# Patient Record
Sex: FEMALE | Race: WHITE | NOT HISPANIC OR LATINO | Employment: FULL TIME | URBAN - METROPOLITAN AREA
[De-identification: names, ages, dates, MRNs, and addresses within clinical notes are randomized per-mention and may not be internally consistent; named-entity substitution may affect disease eponyms.]

---

## 2017-02-19 ENCOUNTER — OFFICE VISIT (OUTPATIENT)
Dept: URGENT CARE | Facility: URGENT CARE | Age: 37
End: 2017-02-19
Payer: COMMERCIAL

## 2017-02-19 VITALS
BODY MASS INDEX: 29.02 KG/M2 | HEIGHT: 64 IN | TEMPERATURE: 98 F | WEIGHT: 170 LBS | DIASTOLIC BLOOD PRESSURE: 80 MMHG | OXYGEN SATURATION: 99 % | HEART RATE: 94 BPM | SYSTOLIC BLOOD PRESSURE: 114 MMHG

## 2017-02-19 DIAGNOSIS — R50.9 FEVER, UNSPECIFIED: ICD-10-CM

## 2017-02-19 DIAGNOSIS — J00 ACUTE NASOPHARYNGITIS: ICD-10-CM

## 2017-02-19 DIAGNOSIS — H66.003 ACUTE SUPPURATIVE OTITIS MEDIA OF BOTH EARS WITHOUT SPONTANEOUS RUPTURE OF TYMPANIC MEMBRANES, RECURRENCE NOT SPECIFIED: Primary | ICD-10-CM

## 2017-02-19 LAB
DEPRECATED S PYO AG THROAT QL EIA: NORMAL
FLUAV+FLUBV AG SPEC QL: NORMAL
FLUAV+FLUBV AG SPEC QL: NORMAL
MICRO REPORT STATUS: NORMAL
SPECIMEN SOURCE: NORMAL
SPECIMEN SOURCE: NORMAL

## 2017-02-19 PROCEDURE — 87804 INFLUENZA ASSAY W/OPTIC: CPT | Performed by: FAMILY MEDICINE

## 2017-02-19 PROCEDURE — 99213 OFFICE O/P EST LOW 20 MIN: CPT | Performed by: FAMILY MEDICINE

## 2017-02-19 PROCEDURE — 87081 CULTURE SCREEN ONLY: CPT | Performed by: FAMILY MEDICINE

## 2017-02-19 PROCEDURE — 87880 STREP A ASSAY W/OPTIC: CPT | Performed by: FAMILY MEDICINE

## 2017-02-19 RX ORDER — AMOXICILLIN 500 MG/1
500 CAPSULE ORAL 3 TIMES DAILY
Qty: 30 CAPSULE | Refills: 0 | Status: SHIPPED | OUTPATIENT
Start: 2017-02-19 | End: 2017-03-01

## 2017-02-19 NOTE — NURSING NOTE
"Chief Complaint   Patient presents with     Urgent Care     Pharyngitis     c/o sore throat for 5 days and eye swollen,ear pain and cough for 2 days       Initial /80 (BP Location: Left arm, Patient Position: Chair, Cuff Size: Adult Regular)  Pulse 94  Temp 98  F (36.7  C) (Tympanic)  Ht 5' 4\" (1.626 m)  Wt 170 lb (77.1 kg)  LMP 01/29/2017  SpO2 99%  Breastfeeding? No  BMI 29.18 kg/m2 Estimated body mass index is 29.18 kg/(m^2) as calculated from the following:    Height as of this encounter: 5' 4\" (1.626 m).    Weight as of this encounter: 170 lb (77.1 kg).  Medication Reconciliation: complete   Clover Navarro MA    "

## 2017-02-19 NOTE — MR AVS SNAPSHOT
After Visit Summary   2/19/2017    Arabella Arrington    MRN: [id number]           Patient Information     Date Of Birth          1980        Visit Information        Provider Department      2/19/2017 12:00 PM Noy Hall DO [facility]        Today's Diagnoses     Acute suppurative otitis media of both ears without spontaneous rupture of tympanic membranes, recurrence not specified    -  1    Fever, unspecified        Acute nasopharyngitis          Care Instructions    Start the antibiotic today.    Good pain control for the ear infections.    If you have and worsening symptoms develop for the eye -- redness, increased swelling, pain in the eyeball, vision changes, light sensitivity, etc - go to the eye clinic for further evaluation.        Follow-ups after your visit        Who to contact     If you have questions or need follow up information about today's clinic visit or your schedule please contact [facility] directly at [phone].  Normal or non-critical lab and imaging results will be communicated to you by Razienthart, letter or phone within 4 business days after the clinic has received the results. If you do not hear from us within 7 days, please contact the clinic through Razienthart or phone. If you have a critical or abnormal lab result, we will notify you by phone as soon as possible.  Submit refill requests through Syrenaica or call your pharmacy and they will forward the refill request to us. Please allow 3 business days for your refill to be completed.          Additional Information About Your Visit        Razienthart Information     Syrenaica gives you secure access to your electronic health record. If you see a primary care provider, you can also send messages to your care team and make appointments. If you have questions, please call your primary care clinic.  If you do not have a primary care provider, please call [phone] and they  "will assist you.        Care EveryWhere ID     This is your Care EveryWhere ID. This could be used by other organizations to access your [place] medical records  [id number]        Your Vitals Were     Pulse Temperature Height Last Period Pulse Oximetry Breastfeeding?    94 98  F (36.7  C) (Tympanic) 5' 4\" (1.626 m) 01/29/2017 99% No    BMI (Body Mass Index)                   29.18 kg/m2            Blood Pressure from Last 3 Encounters:   02/19/17 114/80   01/12/16 117/81   12/18/15 128/76    Weight from Last 3 Encounters:   02/19/17 170 lb (77.1 kg)   01/12/16 185 lb (83.9 kg)   12/18/15 181 lb 9.6 oz (82.4 kg)              We Performed the Following     Beta strep group A culture     Influenza A/B antigen     Strep, Rapid Screen          Today's Medication Changes          These changes are accurate as of: 2/19/17  1:51 PM.  If you have any questions, ask your nurse or doctor.               Start taking these medicines.        Dose/Directions    amoxicillin 500 MG capsule   Commonly known as:  AMOXIL   Used for:  Acute suppurative otitis media of both ears without spontaneous rupture of tympanic membranes, recurrence not specified   Started by:  Noy Hall,         Dose:  500 mg   Take 1 capsule (500 mg) by mouth 3 times daily for 10 days   Quantity:  30 capsule   Refills:  0            Where to get your medicines      These medications were sent to [id number] - [place], [place] - [address] AT [facility]  [address], [place] [address]     Phone:  [phone]     amoxicillin 500 MG capsule                Primary Care Provider Office Phone # Fax #    Barbara EMIL [facility] [phone]       [address] [address]        Thank you!     Thank you for choosing [facility]  for your care. Our goal is always to provide you with excellent care. Hearing back from our patients is one way " we can continue to improve our services. Please take a few minutes to complete the written survey that you may receive in the mail after your visit with us. Thank you!             Your Updated Medication List - Protect others around you: Learn how to safely use, store and throw away your medicines at www.disposemymeds.org.          This list is accurate as of: 2/19/17  1:51 PM.  Always use your most recent med list.                   Brand Name Dispense Instructions for use    amoxicillin 500 MG capsule    AMOXIL    30 capsule    Take 1 capsule (500 mg) by mouth 3 times daily for 10 days       levonorgestrel-ethinyl estradiol 0.1-20 MG-MCG per tablet    AVIANE,ALESSE,LESSINA    84 tablet    Take 1 tablet by mouth daily

## 2017-02-19 NOTE — PROGRESS NOTES
"SUBJECTIVE:   Arabella Arrington is a 36 year old female presenting with a chief complaint of Upper Respiratory/ENT symptoms:  Symptoms started 5 days ago and started with sore throat x 3 day, then rest of symptoms developed.  symptoms include: feverish/chills initially, nasal congestion, rhinorrhea, cough , ear pain right past 2 days, and headache.  Left eye felt a little irritated yesterday, not painful, no vision changes.  Woke with puffy upper right eyelid this morning.  Eye no longer feels irritated.   No redness to the eyelid.  Not itching.  Hasn't worn contact lenses in over a year.   Course of illness is: same.    Treatment measures tried:  OTC meds.  Predisposing factors include:  Non smoker, no h/o asthma.     ROS:  5-Point Review of Systems Negative-- Except as stated above.    OBJECTIVE  /80 (BP Location: Left arm, Patient Position: Chair, Cuff Size: Adult Regular)  Pulse 94  Temp 98  F (36.7  C) (Tympanic)  Ht 5' 4\" (1.626 m)  Wt 170 lb (77.1 kg)  LMP 01/29/2017  SpO2 99%  Breastfeeding? No  BMI 29.18 kg/m2  GENERAL:  Awake, alert and interactive. No acute distress.  HEENT:   NC/AT, EOMI, clear conjunctiva.  Clear nasal discharge.  Oropharynx with mild erythema, moist and clear.  TM's both erythematous, right side bulging, and EAC's benign.  NECK: supple and free of adenopathy  CHEST:  Lungs are clear, no rhonchi, wheezing or rales. Normal symmetric air entry throughout both lung fields.   HEART:  S1 and S2 normal, no murmurs, clicks, gallops or rubs. Regular rate and rhythm.    Results for orders placed or performed in visit on 02/19/17   Influenza A/B antigen   Result Value Ref Range    Influenza A/B Agn Specimen Nasal     Influenza A  NEG     Negative   Test results must be correlated with clinical data. If necessary, results   should be confirmed by a molecular assay or viral culture.      Influenza B  NEG     Negative   Test results must be correlated with clinical data. If necessary, results   " should be confirmed by a molecular assay or viral culture.         ASSESSMENT/PLAN    ICD-10-CM    1. Acute suppurative otitis media of both ears without spontaneous rupture of tympanic membranes, recurrence not specified H66.003 amoxicillin (AMOXIL) 500 MG capsule   2. Fever, unspecified R50.9 Strep, Rapid Screen     Influenza A/B antigen     Beta strep group A culture   3. Acute nasopharyngitis J00        We discussed the expected course and symptomatic cares in detail, including return to care if symptoms not improving as expected, do not resolve completely, or if any new or worsening symptoms develop.

## 2017-02-19 NOTE — PATIENT INSTRUCTIONS
Start the antibiotic today.    Good pain control for the ear infections.    If you have and worsening symptoms develop for the eye -- redness, increased swelling, pain in the eyeball, vision changes, light sensitivity, etc - go to the eye clinic for further evaluation.

## 2017-02-21 LAB
BACTERIA SPEC CULT: NORMAL
MICRO REPORT STATUS: NORMAL
SPECIMEN SOURCE: NORMAL

## 2018-08-21 ENCOUNTER — OFFICE VISIT (OUTPATIENT)
Dept: OBGYN | Facility: CLINIC | Age: 38
End: 2018-08-21
Payer: COMMERCIAL

## 2018-08-21 VITALS
SYSTOLIC BLOOD PRESSURE: 124 MMHG | DIASTOLIC BLOOD PRESSURE: 81 MMHG | TEMPERATURE: 98.6 F | WEIGHT: 188.9 LBS | HEART RATE: 89 BPM | BODY MASS INDEX: 32.42 KG/M2

## 2018-08-21 DIAGNOSIS — N92.0 EXCESSIVE OR FREQUENT MENSTRUATION: ICD-10-CM

## 2018-08-21 DIAGNOSIS — D25.1 INTRAMURAL LEIOMYOMA OF UTERUS: Primary | ICD-10-CM

## 2018-08-21 PROCEDURE — 99213 OFFICE O/P EST LOW 20 MIN: CPT | Performed by: OBSTETRICS & GYNECOLOGY

## 2018-08-21 NOTE — PROGRESS NOTES
"S; Arabella Arrington is a 37 year old  here to discuss fibroids.  She was diagnosed about 2 years ago.  She was started on OCPs at that time and took them for about a year.  She didn't feel that they improved her periods enough to continue, so when she was due for a refill, she stopped taking them.  Since then she reports her periods are regular, about q30 days, lasting 7 days.  She has 2-3 days mid period that are very heavy, she changes her pad and tamping about every 1-2 hours on the heaviest days.  She does have mild cramping, but not overly bothersome.      She reports that earlier this summer her sister asked her to run a 5k, so they began training.  As soon as she started running, she felt that she had to urinate.  She never leaked urine, reports that she would just stop running.  That is the only time she has that sensation.  She denies any obvious \"mass effect\" sensation, but does feel some bloating around the time of her period.  She is in a committed relationship, but they are not sexually active, so she is unsure about pain with intercourse or penetration.  She denies pain or bleeding in between periods.  She is unsure if her periods are bothersome to her, but would like to know if the fibroids are growing.    O: /81  Pulse 89  Temp 98.6  F (37  C) (Oral)  Wt 188 lb 14.4 oz (85.7 kg)  LMP 2018 (Exact Date)  BMI 32.42 kg/m2    Gen: obese female in NAD    No further exam done    A/P; uterine fibroids, menorrhagia   We will begin with a pelvic ultrasound and then a visit after to discuss mgmt options.  We discussed medical options: OCPs, depo, nexplanon, nuvaring, and surgical options: novasure, UAE, myomectomy and hysterectomy.  Handouts were given and she will think it over.  If she would like to move forward with any mgmt changes, we will discuss after us.  Questions answered.    DEEPTI BALDWIN MD    This visit lasted approximately 20 minutes and >50% of the time was spent on " counseling about fibroids and mgmt options.

## 2018-08-21 NOTE — MR AVS SNAPSHOT
After Visit Summary   8/21/2018    Arabella Arrington    MRN: [id number]           Patient Information     Date Of Birth          1980        Visit Information        Provider Department      8/21/2018 3:30 PM Roro Kay MD [facility]        Today's Diagnoses     Intramural leiomyoma of uterus    -  1    Excessive or frequent menstruation           Follow-ups after your visit        Future tests that were ordered for you today     Open Future Orders        Priority Expected Expires Ordered    US Pelvic Complete w Transvaginal Routine  8/21/2019 8/21/2018            Who to contact     If you have questions or need follow up information about today's clinic visit or your schedule please contact [facility] directly at [phone].  Normal or non-critical lab and imaging results will be communicated to you by Curiosityvillehart, letter or phone within 4 business days after the clinic has received the results. If you do not hear from us within 7 days, please contact the clinic through Curiosityvillehart or phone. If you have a critical or abnormal lab result, we will notify you by phone as soon as possible.  Submit refill requests through GlobalPay or call your pharmacy and they will forward the refill request to us. Please allow 3 business days for your refill to be completed.          Additional Information About Your Visit        MyChart Information     GlobalPay gives you secure access to your electronic health record. If you see a primary care provider, you can also send messages to your care team and make appointments. If you have questions, please call your primary care clinic.  If you do not have a primary care provider, please call [phone] and they will assist you.        Care EveryWhere ID     This is your Care EveryWhere ID. This could be used by other organizations to access your [place] medical records  [id number]        Your Vitals Were     Pulse Temperature Last  Period BMI (Body Mass Index)          89 98.6  F (37  C) (Oral) 08/14/2018 (Exact Date) 32.42 kg/m2         Blood Pressure from Last 3 Encounters:   08/21/18 124/81   02/19/17 114/80   01/12/16 117/81    Weight from Last 3 Encounters:   08/21/18 188 lb 14.4 oz (85.7 kg)   02/19/17 170 lb (77.1 kg)   01/12/16 185 lb (83.9 kg)               Primary Care Provider Office Phone # Fax #    Barbara Ha, APRN [facility] [phone] [address] [facility] [address]        Equal Access to Services     NARESH MCLAIN : Tracee Hoskins, rupal toledo, qapanda kaalmada cl, danilo aguilar. So [facility] [phone].    ATENCIÓN: Si habla español, tiene a santana disposición servicios gratuitos de asistencia lingüística. Llame al [phone].    We comply with applicable federal civil rights laws and [place] laws. We do not discriminate on the basis of race, color, national origin, age, disability, sex, sexual orientation, or gender identity.            Thank you!     Thank you for choosing [facility]  for your care. Our goal is always to provide you with excellent care. Hearing back from our patients is one way we can continue to improve our services. Please take a few minutes to complete the written survey that you may receive in the mail after your visit with us. Thank you!             Your Updated Medication List - Protect others around you: Learn how to safely use, store and throw away your medicines at www.disposemymeds.org.      Notice  As of 8/21/2018  4:47 PM    You have not been prescribed any medications.

## 2018-09-05 ENCOUNTER — MYC MEDICAL ADVICE (OUTPATIENT)
Dept: OBGYN | Facility: CLINIC | Age: 38
End: 2018-09-05

## 2018-09-19 ENCOUNTER — OFFICE VISIT (OUTPATIENT)
Dept: OBGYN | Facility: CLINIC | Age: 38
End: 2018-09-19
Attending: OBSTETRICS & GYNECOLOGY
Payer: COMMERCIAL

## 2018-09-19 ENCOUNTER — RADIANT APPOINTMENT (OUTPATIENT)
Dept: ULTRASOUND IMAGING | Facility: CLINIC | Age: 38
End: 2018-09-19
Attending: OBSTETRICS & GYNECOLOGY
Payer: COMMERCIAL

## 2018-09-19 VITALS — WEIGHT: 189.7 LBS | BODY MASS INDEX: 32.56 KG/M2 | SYSTOLIC BLOOD PRESSURE: 116 MMHG | DIASTOLIC BLOOD PRESSURE: 78 MMHG

## 2018-09-19 DIAGNOSIS — D25.1 INTRAMURAL LEIOMYOMA OF UTERUS: Primary | ICD-10-CM

## 2018-09-19 DIAGNOSIS — D25.1 INTRAMURAL LEIOMYOMA OF UTERUS: ICD-10-CM

## 2018-09-19 DIAGNOSIS — N92.0 EXCESSIVE OR FREQUENT MENSTRUATION: ICD-10-CM

## 2018-09-19 PROCEDURE — 76830 TRANSVAGINAL US NON-OB: CPT | Performed by: OBSTETRICS & GYNECOLOGY

## 2018-09-19 PROCEDURE — 99214 OFFICE O/P EST MOD 30 MIN: CPT | Performed by: OBSTETRICS & GYNECOLOGY

## 2018-09-19 NOTE — PROGRESS NOTES
S; Arabella Arrington is a 37 year old  who has known fibroids.  She was seen recently for this and we discussed mgmt options.  She was uncertain about what, if anything she wanted to do, so the plan was to do an us to check the fibroids and then discuss further.  Since her last visit she reports that last Friday she had an acute episode of lower abdominal pain that was very severe in nature while she was at work.  She did end up leaving work and going to the emergency room where a CT scan was done which did not show any abnormal findings.  The fibroids that were seen were consistent with what had been seen previously on her ultrasound in .  She reports that the pain lasted approximately 3 days it was most severe the first day into the second day and then slowly improved and by the third day she was hardly noticing it.  She was told at the emergency room that it was due to her fibroids.  That has her very concerned about what she should do going forward.  Her menses did begin today.  She is otherwise without complaints and is unsure what the next best step in management is.    O: /78  Wt 189 lb 11.2 oz (86 kg)  LMP 2018 (Exact Date)  BMI 32.56 kg/m2    Gen: tearful, but NAD    Prelim us: uterus 11.6 x 8.4 x 8.7.  Anterior intramural fibroid 6.5 x 5.1 x 5.7cm, 2 fundal and left fibroids: largest 6.3 x 5.4 x 5.7.  4.3cm solid nodule of left ovary, possibly a fibroma    A/P: symptomatic uterine fibroids   We reviewed her recent pain episode and explained that it is hard to know if this was actually due to fibroids or not.  Explained that typically fibroids do not cause acute pain like that unless there is rapid necrosis of the fibroid.  Explained that neither the CT scanner today's ultrasound showed evidence of a degenerating fibroid.  Explained it is possible it was a ruptured ovarian cyst although no fluid was seen in the pelvis or possibly a non-GYN related episode of pain.  I did reassure her  that the CT scan did not find anything abnormal the pain resolved spontaneously and has not returned so it is likely nothing worrisome.     We had another lengthy discussion about the possible management options for her fibroids.  She was still quite undecided about what to do so we reviewed options of oral contraceptive pills Nexplanon NuvaRing Depo-Provera shot Mirena IUD.  We also discussed surgical options of uterine artery embolization, endometrial ablation, abdominal myomectomy and hysterectomy.  We spent a while discussing what her primary symptoms are whether it is pain and the feeling of bulk in her abdomen or if it is her periods that bother her most.  She is not quite sure but she thinks she is not ready to move on to surgical options so she was interested in medical management.  Since she has tried birth control pills in the past and they have been successful she wanted to try something else.  We discussed the pros and cons of all options explained that I think if a Mirena IUD was able to be placed appropriately and deployed in the uterus successfully that would hopefully be the best option for her although I did explain and has most likely a higher expulsion rate given the large fibroids.  We discussed option of an ultrasound guided placement with an endometrial biopsy at that same time.  She likes that and asked that if we were unsuccessful with placing the IUD could she have a Nexplanon placed the same day and I think that is appropriate as well.  We did discuss at length the bleeding profile the Nexplanon and she is aware that there is a 50% chance that her bleeding will be unchanged or even worsened with the Nexplanon but that it can be removed at any time if the bleeding profile is unsatisfactory.  Her current plan is she will return to clinic for an ultrasound-guided endometrial biopsy and Mirena IUD placement.  If that is unsuccessful we will place a Nexplanon.  Handouts given for both and  questions answered.    DEEPTI BALDWIN MD      This visit lasted approximately 30 minutes and >50% of the time was spent on counseling about fibroids and mgmt options.

## 2018-09-19 NOTE — MR AVS SNAPSHOT
After Visit Summary   9/19/2018    Arabella Arrington    MRN: [id number]           Patient Information     Date Of Birth          1980        Visit Information        Provider Department      9/19/2018 11:00 AM Roro Kay MD [facility]        Today's Diagnoses     Intramural leiomyoma of uterus    -  1       Follow-ups after your visit        Future tests that were ordered for you today     Open Future Orders        Priority Expected Expires Ordered    US Pelvic Limited Routine  9/19/2019 9/19/2018            Who to contact     If you have questions or need follow up information about today's clinic visit or your schedule please contact [facility] directly at [phone].  Normal or non-critical lab and imaging results will be communicated to you by MyChart, letter or phone within 4 business days after the clinic has received the results. If you do not hear from us within 7 days, please contact the clinic through Medtrics Labhart or phone. If you have a critical or abnormal lab result, we will notify you by phone as soon as possible.  Submit refill requests through [email or web address] or call your pharmacy and they will forward the refill request to us. Please allow 3 business days for your refill to be completed.          Additional Information About Your Visit        MyChart Information     [email or web address] gives you secure access to your electronic health record. If you see a primary care provider, you can also send messages to your care team and make appointments. If you have questions, please call your primary care clinic.  If you do not have a primary care provider, please call [phone] and they will assist you.        Care EveryWhere ID     This is your Care EveryWhere ID. This could be used by other organizations to access your [place] medical records  [id number]        Your Vitals Were     Last Period BMI (Body Mass Index)                09/19/2018 (Exact Date) 32.56 kg/m2            Blood Pressure from Last 3 Encounters:   09/19/18 116/78   08/21/18 124/81   02/19/17 114/80    Weight from Last 3 Encounters:   09/19/18 189 lb 11.2 oz (86 kg)   08/21/18 188 lb 14.4 oz (85.7 kg)   02/19/17 170 lb (77.1 kg)               Primary Care Provider Office Phone # Fax #    EMIL [facility] [phone] [address] [facility] [address]        Equal Access to Services     [facility] RAYNE : Hadii isabella ca Somag, waaxda luqadaha, qaybta kaalmada cl, danilo keith . So [facility] [phone].    ATENCIÓN: Si habla español, tiene a santana disposición servicios gratuitos de asistencia lingüística. Llame al [phone].    We comply with applicable federal civil rights laws and [place] laws. We do not discriminate on the basis of race, color, national origin, age, disability, sex, sexual orientation, or gender identity.            Thank you!     Thank you for choosing [facility]  for your care. Our goal is always to provide you with excellent care. Hearing back from our patients is one way we can continue to improve our services. Please take a few minutes to complete the written survey that you may receive in the mail after your visit with us. Thank you!             Your Updated Medication List - Protect others around you: Learn how to safely use, store and throw away your medicines at www.disposemymeds.org.          This list is accurate as of 9/19/18 12:15 PM.  Always use your most recent med list.                   Brand Name Dispense Instructions for use Diagnosis    IBUPROFEN PO      Take 600 mg by mouth

## 2019-02-15 ENCOUNTER — HEALTH MAINTENANCE LETTER (OUTPATIENT)
Age: 39
End: 2019-02-15

## 2020-03-02 ENCOUNTER — HEALTH MAINTENANCE LETTER (OUTPATIENT)
Age: 40
End: 2020-03-02

## 2020-12-20 ENCOUNTER — HEALTH MAINTENANCE LETTER (OUTPATIENT)
Age: 40
End: 2020-12-20

## 2021-04-19 ENCOUNTER — IMMUNIZATION (OUTPATIENT)
Dept: NURSING | Facility: CLINIC | Age: 41
End: 2021-04-19
Payer: COMMERCIAL

## 2021-04-19 PROCEDURE — 91300 PR COVID VAC PFIZER DIL RECON 30 MCG/0.3 ML IM: CPT

## 2021-04-19 PROCEDURE — 0001A PR COVID VAC PFIZER DIL RECON 30 MCG/0.3 ML IM: CPT

## 2021-04-24 ENCOUNTER — HEALTH MAINTENANCE LETTER (OUTPATIENT)
Age: 41
End: 2021-04-24

## 2021-05-10 ENCOUNTER — IMMUNIZATION (OUTPATIENT)
Dept: NURSING | Facility: CLINIC | Age: 41
End: 2021-05-10
Attending: INTERNAL MEDICINE
Payer: COMMERCIAL

## 2021-05-10 PROCEDURE — 0002A PR COVID VAC PFIZER DIL RECON 30 MCG/0.3 ML IM: CPT

## 2021-05-10 PROCEDURE — 91300 PR COVID VAC PFIZER DIL RECON 30 MCG/0.3 ML IM: CPT

## 2021-10-03 ENCOUNTER — HEALTH MAINTENANCE LETTER (OUTPATIENT)
Age: 41
End: 2021-10-03

## 2022-01-14 ASSESSMENT — ENCOUNTER SYMPTOMS
DIZZINESS: 0
FEVER: 0
CHILLS: 0
ARTHRALGIAS: 0
CONSTIPATION: 0
BREAST MASS: 0
HEARTBURN: 0
EYE PAIN: 0
PARESTHESIAS: 0
NAUSEA: 0
MYALGIAS: 0
HEADACHES: 0
HEMATURIA: 0
FREQUENCY: 0
DIARRHEA: 0
NERVOUS/ANXIOUS: 1
SHORTNESS OF BREATH: 0
DYSURIA: 0
SORE THROAT: 0
HEMATOCHEZIA: 0
PALPITATIONS: 0
COUGH: 0
JOINT SWELLING: 0
WEAKNESS: 0
ABDOMINAL PAIN: 0

## 2022-01-17 ENCOUNTER — OFFICE VISIT (OUTPATIENT)
Dept: FAMILY MEDICINE | Facility: CLINIC | Age: 42
End: 2022-01-17
Payer: COMMERCIAL

## 2022-01-17 VITALS
DIASTOLIC BLOOD PRESSURE: 70 MMHG | BODY MASS INDEX: 31.76 KG/M2 | RESPIRATION RATE: 16 BRPM | SYSTOLIC BLOOD PRESSURE: 120 MMHG | TEMPERATURE: 97 F | HEART RATE: 85 BPM | HEIGHT: 64 IN | OXYGEN SATURATION: 100 % | WEIGHT: 186 LBS

## 2022-01-17 DIAGNOSIS — N92.0 MENORRHAGIA WITH REGULAR CYCLE: ICD-10-CM

## 2022-01-17 DIAGNOSIS — Z12.11 SPECIAL SCREENING FOR MALIGNANT NEOPLASMS, COLON: ICD-10-CM

## 2022-01-17 DIAGNOSIS — Z83.719 FAMILY HISTORY OF COLONIC POLYPS: ICD-10-CM

## 2022-01-17 DIAGNOSIS — Z00.00 ROUTINE HISTORY AND PHYSICAL EXAMINATION OF ADULT: Primary | ICD-10-CM

## 2022-01-17 DIAGNOSIS — Z13.6 ENCOUNTER FOR LIPID SCREENING FOR CARDIOVASCULAR DISEASE: ICD-10-CM

## 2022-01-17 DIAGNOSIS — Z71.89 ADVANCED DIRECTIVES, COUNSELING/DISCUSSION: ICD-10-CM

## 2022-01-17 DIAGNOSIS — L98.9 SKIN LESION: ICD-10-CM

## 2022-01-17 DIAGNOSIS — Z13.1 SCREENING FOR DIABETES MELLITUS: ICD-10-CM

## 2022-01-17 DIAGNOSIS — R73.03 PREDIABETES: ICD-10-CM

## 2022-01-17 DIAGNOSIS — D22.9 MULTIPLE PIGMENTED NEVI: ICD-10-CM

## 2022-01-17 DIAGNOSIS — Z13.220 ENCOUNTER FOR LIPID SCREENING FOR CARDIOVASCULAR DISEASE: ICD-10-CM

## 2022-01-17 DIAGNOSIS — N83.202 CYST OF LEFT OVARY: ICD-10-CM

## 2022-01-17 DIAGNOSIS — D25.1 INTRAMURAL LEIOMYOMA OF UTERUS: ICD-10-CM

## 2022-01-17 DIAGNOSIS — D50.0 IRON DEFICIENCY ANEMIA DUE TO CHRONIC BLOOD LOSS: ICD-10-CM

## 2022-01-17 DIAGNOSIS — Z83.2 FAMILY HISTORY OF FACTOR V LEIDEN MUTATION: ICD-10-CM

## 2022-01-17 DIAGNOSIS — Z80.0 FAMILY HISTORY OF ESOPHAGEAL CANCER: ICD-10-CM

## 2022-01-17 DIAGNOSIS — Z00.00 HEALTH CARE MAINTENANCE: ICD-10-CM

## 2022-01-17 DIAGNOSIS — Z11.59 NEED FOR HEPATITIS C SCREENING TEST: ICD-10-CM

## 2022-01-17 LAB
ALBUMIN SERPL-MCNC: 4 G/DL (ref 3.4–5)
ALP SERPL-CCNC: 73 U/L (ref 40–150)
ALT SERPL W P-5'-P-CCNC: 56 U/L (ref 0–50)
ANION GAP SERPL CALCULATED.3IONS-SCNC: 8 MMOL/L (ref 3–14)
AST SERPL W P-5'-P-CCNC: 24 U/L (ref 0–45)
BASOPHILS # BLD AUTO: 0 10E3/UL (ref 0–0.2)
BASOPHILS NFR BLD AUTO: 1 %
BILIRUB SERPL-MCNC: 0.4 MG/DL (ref 0.2–1.3)
BUN SERPL-MCNC: 14 MG/DL (ref 7–30)
CALCIUM SERPL-MCNC: 9.2 MG/DL (ref 8.5–10.1)
CHLORIDE BLD-SCNC: 107 MMOL/L (ref 94–109)
CHOLEST SERPL-MCNC: 242 MG/DL
CO2 SERPL-SCNC: 23 MMOL/L (ref 20–32)
CREAT SERPL-MCNC: 0.77 MG/DL (ref 0.52–1.04)
EOSINOPHIL # BLD AUTO: 0.2 10E3/UL (ref 0–0.7)
EOSINOPHIL NFR BLD AUTO: 3 %
ERYTHROCYTE [DISTWIDTH] IN BLOOD BY AUTOMATED COUNT: 16.5 % (ref 10–15)
FACTOR V INTERPRETATION: NORMAL
FASTING STATUS PATIENT QL REPORTED: YES
FERRITIN SERPL-MCNC: 9 NG/ML (ref 12–150)
GFR SERPL CREATININE-BSD FRML MDRD: >90 ML/MIN/1.73M2
GLUCOSE BLD-MCNC: 103 MG/DL (ref 70–99)
HBA1C MFR BLD: 5.7 % (ref 0–5.6)
HCT VFR BLD AUTO: 33.5 % (ref 35–47)
HCV AB SERPL QL IA: NONREACTIVE
HDLC SERPL-MCNC: 43 MG/DL
HGB BLD-MCNC: 9.8 G/DL (ref 11.7–15.7)
IMM GRANULOCYTES # BLD: 0 10E3/UL
IMM GRANULOCYTES NFR BLD: 0 %
IRON SERPL-MCNC: 16 UG/DL (ref 35–180)
LAB DIRECTOR COMMENTS: NORMAL
LAB DIRECTOR DISCLAIMER: NORMAL
LAB DIRECTOR INTERPRETATION: NORMAL
LAB DIRECTOR METHODOLOGY: NORMAL
LAB DIRECTOR RESULTS: NORMAL
LDLC SERPL CALC-MCNC: 158 MG/DL
LYMPHOCYTES # BLD AUTO: 1.8 10E3/UL (ref 0.8–5.3)
LYMPHOCYTES NFR BLD AUTO: 25 %
MCH RBC QN AUTO: 21.1 PG (ref 26.5–33)
MCHC RBC AUTO-ENTMCNC: 29.3 G/DL (ref 31.5–36.5)
MCV RBC AUTO: 72 FL (ref 78–100)
MONOCYTES # BLD AUTO: 0.5 10E3/UL (ref 0–1.3)
MONOCYTES NFR BLD AUTO: 8 %
NEUTROPHILS # BLD AUTO: 4.5 10E3/UL (ref 1.6–8.3)
NEUTROPHILS NFR BLD AUTO: 63 %
NONHDLC SERPL-MCNC: 199 MG/DL
PLATELET # BLD AUTO: 302 10E3/UL (ref 150–450)
POTASSIUM BLD-SCNC: 3.9 MMOL/L (ref 3.4–5.3)
PROT SERPL-MCNC: 8.3 G/DL (ref 6.8–8.8)
RBC # BLD AUTO: 4.64 10E6/UL (ref 3.8–5.2)
SODIUM SERPL-SCNC: 138 MMOL/L (ref 133–144)
SPECIMEN DESCRIPTION: NORMAL
TRIGL SERPL-MCNC: 203 MG/DL
TSH SERPL DL<=0.005 MIU/L-ACNC: 3.44 MU/L (ref 0.4–4)
WBC # BLD AUTO: 7.1 10E3/UL (ref 4–11)

## 2022-01-17 PROCEDURE — G0452 MOLECULAR PATHOLOGY INTERPR: HCPCS | Performed by: PATHOLOGY

## 2022-01-17 PROCEDURE — 36415 COLL VENOUS BLD VENIPUNCTURE: CPT | Performed by: FAMILY MEDICINE

## 2022-01-17 PROCEDURE — 83036 HEMOGLOBIN GLYCOSYLATED A1C: CPT | Performed by: FAMILY MEDICINE

## 2022-01-17 PROCEDURE — 99386 PREV VISIT NEW AGE 40-64: CPT | Performed by: FAMILY MEDICINE

## 2022-01-17 PROCEDURE — 80050 GENERAL HEALTH PANEL: CPT | Performed by: FAMILY MEDICINE

## 2022-01-17 PROCEDURE — 80061 LIPID PANEL: CPT | Performed by: FAMILY MEDICINE

## 2022-01-17 PROCEDURE — 86803 HEPATITIS C AB TEST: CPT | Performed by: FAMILY MEDICINE

## 2022-01-17 PROCEDURE — 99213 OFFICE O/P EST LOW 20 MIN: CPT | Mod: 25 | Performed by: FAMILY MEDICINE

## 2022-01-17 PROCEDURE — 81241 F5 GENE: CPT | Performed by: FAMILY MEDICINE

## 2022-01-17 PROCEDURE — 82728 ASSAY OF FERRITIN: CPT | Performed by: FAMILY MEDICINE

## 2022-01-17 PROCEDURE — 83540 ASSAY OF IRON: CPT | Performed by: FAMILY MEDICINE

## 2022-01-17 ASSESSMENT — ENCOUNTER SYMPTOMS
EYE PAIN: 0
MYALGIAS: 0
HEARTBURN: 0
ABDOMINAL PAIN: 0
PALPITATIONS: 0
DIARRHEA: 0
NAUSEA: 0
JOINT SWELLING: 0
WEAKNESS: 0
PARESTHESIAS: 0
CHILLS: 0
SHORTNESS OF BREATH: 0
SORE THROAT: 0
CONSTIPATION: 0
BREAST MASS: 0
DIZZINESS: 0
ARTHRALGIAS: 0
HEADACHES: 0
FEVER: 0
HEMATOCHEZIA: 0
FREQUENCY: 0
COUGH: 0
NERVOUS/ANXIOUS: 1
DYSURIA: 0
HEMATURIA: 0

## 2022-01-17 ASSESSMENT — MIFFLIN-ST. JEOR: SCORE: 1484.72

## 2022-01-17 NOTE — RESULT ENCOUNTER NOTE
Sera Ms. Arrington,  Your results came back and show. -Hemoglobin is decreased indicating anemia.  Anemia can cause fatigue and, occasionally, light-headedness.  This is common in menstruating women and is usually caused by an iron deficiency.  ADVISE: eating a diet has a lot of iron-rich foods such as lean red meat and green, leafy vegetables.  You should take a daily iron supplement otc at least 27 mg of elemental iron  Then, rechecking this lab in 3 months.  We will see what your iron levels show.  I suspect this is from your heavy periods.  If you would like to see the gynecologist regarding the fibroids that are contributing to heavy periods resulting in anemia I can put a referral and let us know.  -White blood cell and platelet counts are normal.. If you have any further concerns please do not hesitate to contact us by message, phone or making an appointment.  Have a good day   Dr Hitesh MARIE

## 2022-01-17 NOTE — RESULT ENCOUNTER NOTE
Sera Ms. Arrington,  Your results came back and currently HBA1c in prediabetic range. This is a glucose attached to your red blood cell so is an average of hat your glucose level is over past 3 months. If goes above 6.5 can get a diagnosis of diabetes. Glad you are prioritizing health this year. Work on a low car bdiet and loosing 10 % of body weight and checking in 6 month to 1 yr   If you have any further concerns please do not hesitate to contact us by message, phone or making an appointment.  Have a good day   Dr Hitesh MARIE

## 2022-01-17 NOTE — PROGRESS NOTES
SUBJECTIVE:   CC: Arabella Arrington is an 41 year old woman who presents for preventive health visit.     Patient has been advised of split billing requirements and indicates understanding: Yes  Healthy Habits:     Getting at least 3 servings of Calcium per day:  NO    Bi-annual eye exam:  Yes    Dental care twice a year:  NO    Sleep apnea or symptoms of sleep apnea:  None    Diet:  Regular (no restrictions)    Frequency of exercise:  2-3 days/week    Duration of exercise:  15-30 minutes    Taking medications regularly:  Yes    Medication side effects:  Not applicable    PHQ-2 Total Score: 1    Additional concerns today:  Yes    41-year-old lady, with history of prior intramural leiomyomas and left ovarian cyst, BMI more than 32, previously under care of Barbara Ha, seen by GYN last in 2018 for fibroids following episode of acute pain that made her seek ER care & ct scan ordered. Was told fibroids were unlikely cause of the acute severe pain at the time.Options discussed and was to think about it and get back to gynecology at that time.  [place]  negative.    Here for a preventive physical.  New to this provider.  Has not doctored anywhere else since 2018.  Is a  at [facility] [employer], living with her partner of over 20 years in a monogamous relationship.  LMP 2022 and declines Pap smear today will return for that at another visit. Periods regular  mammogram due  Mother  of esophageal cancer age 65 suspected due to poor lifestyle choices and from smoking  Father had colon polyps, and colon removed because of it but as far she knows no colon cancer history.  Sister advised she look into getting a colonoscopy  Health care maintenance reviewed  No living will, given honoring choices  Covid booster and flu shot utd  Is fasting for lab work today.    BMI 32.  Reports is making this year priority for health.    Left ovarian cyst no follow up since 2018, asymptomatic agreeable to  "getting a pelvic ultrasound for follow-up as lost to follow-up in 2018. U/s in 2018 showed \" The uterus is enlarged and contains several fibroids as listed above.  The endometrium measures thick, but she is currently menstruating. The right ovary was visualized and no abnormalities were seen. The left ovary contains a solid appearing nodule, possibly a fibroma. Recommend repeat ultrasound in 2-3 menstrual cycles to check for cyst stability. \"  Hx of fibroids, no repeat of severe pain, periods are heavy but regular, cramping helped with ibuprofen, no symptoms of anemia and that reports no headache or dizziness or fatigue or lightheadedness or fainting or chest pain or trouble breathing with activity.    Has multiple pigmented moles on her body has one on her right lower thigh that has not changed but gets caught on things and another on the right side of her nose that showed up several years ago and itches sometimes otherwise no changes but would like to have it removed and get a referral to dermatology    Family history of esophageal cancer, colonic polyps, factor V Leiden mutation or deficiency in mother unclear diagnosis.  Agreeable to getting factor V Leiden mutation genetic blood test done today genetic consent form signed    Today's PHQ-2 Score:   PHQ-2 (  [employer]) 2022   Q1: Little interest or pleasure in doing things 0   Q2: Feeling down, depressed or hopeless 1   PHQ-2 Score 1   Q1: Little interest or pleasure in doing things Not at all   Q2: Feeling down, depressed or hopeless Several days   PHQ-2 Score 1     Abuse: Current or Past (Physical, Sexual or Emotional) - No  Do you feel safe in your environment? Yes    Social History     Tobacco Use     Smoking status: Former Smoker     Packs/day: 0.50     Years: 11.00     Pack years: 5.50     Types: Cigarettes     Start date: 1999     Quit date: 11/15/2009     Years since quittin.1     Smokeless tobacco: Never Used   Substance Use Topics     " Alcohol use: Yes     Comment: 0-1 drink weekly     If you drink alcohol do you typically have >3 drinks per day or >7 drinks per week? No    No flowsheet data found.    Reviewed orders with patient.  Reviewed health maintenance and updated orders accordingly - Yes  Lab work is in process  Labs reviewed in EPIC  BP Readings from Last 3 Encounters:   22 120/70   18 116/78   18 124/81    Wt Readings from Last 3 Encounters:   22 84.4 kg (186 lb)   18 86 kg (189 lb 11.2 oz)   18 85.7 kg (188 lb 14.4 oz)         Patient Active Problem List   Diagnosis     Intramural leiomyoma of uterus     Cyst of left ovary     BMI 32.0-32.9,adult     Family history of esophageal cancer     Family history of colonic polyps     Menorrhagia with regular cycle     Family history of factor V Leiden mutation     Past Surgical History:   Procedure Laterality Date     no surgical history         Social History     Tobacco Use     Smoking status: Former Smoker     Packs/day: 0.50     Years: 11.00     Pack years: 5.50     Types: Cigarettes     Start date: 1999     Quit date: 11/15/2009     Years since quittin.1     Smokeless tobacco: Never Used   Substance Use Topics     Alcohol use: Yes     Comment: 0-1 drink weekly     Family History   Problem Relation Age of Onset     Esophageal Cancer Mother 55        esophageal cancer     Blood Disease Mother         factor 5     Diabetes Father         type 2     Respiratory Father         emphasema     Colon Polyps Father         had colon removed     Diabetes Paternal Grandmother          No current outpatient medications on file.     No Known Allergies  Recent Labs   Lab Test 22  1111 12/18/15  1041 12/01/15  0932 14  1037   A1C 5.7*  --   --   --    LDL  --   --  137* 135*   HDL  --   --  43* 40*   TRIG  --   --  367* 272*   TSH  --  3.12  --   --         Breast Cancer Screening:    FHS-7:   Breast CA Risk Assessment (FHS-7) 2022   Did any of  your first-degree relatives have breast or ovarian cancer? No   Did any of your relatives have bilateral breast cancer? No   Did any man in your family have breast cancer? No   Did any woman in your family have breast and ovarian cancer? No   Did any woman in your family have breast cancer before age 50 y? No   Do you have 2 or more relatives with breast and/or ovarian cancer? No   Do you have 2 or more relatives with breast and/or bowel cancer? No     Mammogram Screening - Offered annual screening and updated Health Maintenance for [place] plan based on risk factor consideration    Pertinent mammograms are reviewed under the imaging tab.    History of abnormal Pap smear:   NO - age 30-65 PAP every 5 years with negative HPV co-testing recommended  Last 3 Pap and HPV Results:   PAP / HPV 2015 3/13/2014   PAP (Historical) NIL NIL     PAP / HPV 2015 3/13/2014   PAP (Historical) NIL NIL     Reviewed and updated as needed this visit by clinical staff  Tobacco  Allergies  Meds  Problems  Med Hx  Surg Hx  Fam Hx  Soc Hx         Reviewed and updated as needed this visit by Provider  Tobacco  Allergies  Meds  Problems  Med Hx  Surg Hx  Fam Hx  Soc Hx        Past Medical History:   Diagnosis Date     none       Past Surgical History:   Procedure Laterality Date     no surgical history       OB History    Para Term  AB Living   0 0 0 0 0 0   SAB IAB Ectopic Multiple Live Births   0 0 0 0 0       Review of Systems   Constitutional: Negative for chills and fever.   HENT: Negative for congestion, ear pain, hearing loss and sore throat.    Eyes: Negative for pain and visual disturbance.   Respiratory: Negative for cough and shortness of breath.    Cardiovascular: Negative for chest pain, palpitations and peripheral edema.   Gastrointestinal: Negative for abdominal pain, constipation, diarrhea, heartburn, hematochezia and nausea.   Breasts:  Negative for tenderness, breast mass and discharge.  "  Genitourinary: Negative for dysuria, frequency, genital sores, hematuria, pelvic pain, urgency, vaginal bleeding and vaginal discharge.   Musculoskeletal: Negative for arthralgias, joint swelling and myalgias.   Skin: Negative for rash.   Neurological: Negative for dizziness, weakness, headaches and paresthesias.   Psychiatric/Behavioral: Negative for mood changes. The patient is nervous/anxious.       OBJECTIVE:   /70 (BP Location: Right arm, Patient Position: Chair, Cuff Size: Adult Regular)   Pulse 85   Temp 97  F (36.1  C) (Temporal)   Resp 16   Ht 1.619 m (5' 3.75\")   Wt 84.4 kg (186 lb)   LMP 01/14/2022 (Approximate)   SpO2 100%   BMI 32.18 kg/m    Physical Exam  GENERAL: healthy, alert, no distress and obese  EYES: Eyes grossly normal to inspection, PERRL and conjunctivae and sclerae normal  HENT: ear canals and TM's normal, nose and mouth without ulcers or lesions  NECK: no adenopathy, no asymmetry, masses, or scars and thyroid normal to palpation  RESP: lungs clear to auscultation - no rales, rhonchi or wheezes  BREAST: normal without masses, tenderness or nipple discharge and no palpable axillary masses or adenopathy  CV: regular rate and rhythm, normal S1 S2, no S3 or S4, no murmur, click or rub, no peripheral edema and peripheral pulses strong  ABDOMEN: soft, non tender, no hepatosplenomegaly, no masses and bowel sounds normal  MS: no gross musculoskeletal defects noted, no edema  SKIN: no suspicious lesions or rashes, multiple pigmented nevi on face, neck, back, chest, extremities, solar lentigines, freckles, cherry angioma, seborrheic keratosis on some parts of the trunk.  Has a raised 3 mm pigmented papule right teresa nasi and reports another raised lesion right lower anterior thigh that has been itching and bothering her  NEURO: Normal strength and tone, mentation intact and speech normal  PSYCH: mentation appears normal, affect normal/bright  LYMPH: no cervical, supraclavicular, " axillary, or inguinal adenopathy    Diagnostic Test Results:  Labs reviewed in Epic  Results for orders placed or performed in visit on 01/17/22 (from the past 24 hour(s))   CBC with platelets and differential    Narrative    The following orders were created for panel order CBC with platelets and differential.  Procedure                               Abnormality         Status                     ---------                               -----------         ------                     CBC with platelets and d...[[id number]]  Abnormal            Final result                 Please view results for these tests on the individual orders.   Hemoglobin A1c   Result Value Ref Range    Hemoglobin A1C 5.7 (H) 0.0 - 5.6 %   CBC with platelets and differential   Result Value Ref Range    WBC Count 7.1 4.0 - 11.0 10e3/uL    RBC Count 4.64 3.80 - 5.20 10e6/uL    Hemoglobin 9.8 (L) 11.7 - 15.7 g/dL    Hematocrit 33.5 (L) 35.0 - 47.0 %    MCV 72 (L) 78 - 100 fL    MCH 21.1 (L) 26.5 - 33.0 pg    MCHC 29.3 (L) 31.5 - 36.5 g/dL    RDW 16.5 (H) 10.0 - 15.0 %    Platelet Count 302 150 - 450 10e3/uL    % Neutrophils 63 %    % Lymphocytes 25 %    % Monocytes 8 %    % Eosinophils 3 %    % Basophils 1 %    % Immature Granulocytes 0 %    Absolute Neutrophils 4.5 1.6 - 8.3 10e3/uL    Absolute Lymphocytes 1.8 0.8 - 5.3 10e3/uL    Absolute Monocytes 0.5 0.0 - 1.3 10e3/uL    Absolute Eosinophils 0.2 0.0 - 0.7 10e3/uL    Absolute Basophils 0.0 0.0 - 0.2 10e3/uL    Absolute Immature Granulocytes 0.0 <=0.4 10e3/uL       ASSESSMENT/PLAN:       ICD-10-CM    1. Routine history and physical examination of adult  Z00.00 MA Screening Digital Bilateral     Comprehensive metabolic panel (BMP + Alb, Alk Phos, ALT, AST, Total. Bili, TP)     Lipid panel reflex to direct LDL Fasting     Hepatitis C Screen Reflex to HCV RNA Quant and Genotype     Hemoglobin A1c     Adult Gastro Ref - Procedure Only     Comprehensive metabolic panel (BMP + Alb, Alk Phos, ALT, AST,  Total. Bili, TP)     Lipid panel reflex to direct LDL Fasting     Hepatitis C Screen Reflex to HCV RNA Quant and Genotype     Hemoglobin A1c   2. BMI 32.0-32.9,adult  Z68.32 Comprehensive metabolic panel (BMP + Alb, Alk Phos, ALT, AST, Total. Bili, TP)     Lipid panel reflex to direct LDL Fasting     TSH with free T4 reflex     Hemoglobin A1c     Comprehensive metabolic panel (BMP + Alb, Alk Phos, ALT, AST, Total. Bili, TP)     Lipid panel reflex to direct LDL Fasting     TSH with free T4 reflex     Hemoglobin A1c   3. Cyst of left ovary  N83.202 CBC with platelets and differential     US Pelvic Complete with Transvaginal     CBC with platelets and differential   4. Intramural leiomyoma of uterus  D25.1 CBC with platelets and differential     US Pelvic Complete with Transvaginal     CBC with platelets and differential   5. Menorrhagia with regular cycle  N92.0    6. Multiple pigmented nevi  D22.9    7. Skin lesion  L98.9 Adult Dermatology Referral     Ferritin     Iron     Ferritin     Iron   8. Family history of esophageal cancer  Z80.0    9. Family history of colonic polyps  Z83.71 Adult Gastro Ref - Procedure Only   10. Family history of factor V Leiden mutation  Z83.2 Factor 5 leiden mutation analysis     Factor 5 leiden mutation analysis   11. Advanced directives, counseling/discussion  Z71.89    12. Health care maintenance  Z00.00 REVIEW OF HEALTH MAINTENANCE PROTOCOL ORDERS   13. Screening for diabetes mellitus  Z13.1 Comprehensive metabolic panel (BMP + Alb, Alk Phos, ALT, AST, Total. Bili, TP)     Hemoglobin A1c     Comprehensive metabolic panel (BMP + Alb, Alk Phos, ALT, AST, Total. Bili, TP)     Hemoglobin A1c   14. Encounter for lipid screening for cardiovascular disease  Z13.220 Lipid panel reflex to direct LDL Fasting    Z13.6 Lipid panel reflex to direct LDL Fasting   15. Need for hepatitis C screening test  Z11.59 Hepatitis C Screen Reflex to HCV RNA Quant and Genotype     Hepatitis C Screen Reflex  to HCV RNA Quant and Genotype   16. Special screening for malignant neoplasms, colon  Z12.11 Adult Gastro Ref - Procedure Only   17. Iron deficiency anemia due to chronic blood loss  D50.0 CBC with platelets and differential     Lab Blood Morphology Pathologist Review     Iron and iron binding capacity     Ferritin     Vitamin B12   18. Prediabetes  R73.03      Seen for preventive health and additional concerns today   Self breast check regularly   Consider referral to  given fh of esophageal cancer , colon polyps, factor 5 if insurance will cover  Mammogram due and referral placed  Return for Pelvic / PAP when not on period  Healthcare maintenance reviewed.  Consider working on healthcare directives, honoring choices given.  COVID-vaccine including booster and flu shot up-to-date.  Labs today and will make further recommendations once reviewed    BMI 32:  Encouraged a healthy diet, frequent small meals, less carbohydrates more Mediterranean-style.  Try to lose at least 10% of total body weight over time to be healthier.  After the visit hemoglobin A1c came back elevated at 5.7 suggesting prediabetes, recommend rechecking in 6 months to 1 year    Due to prior left ovarian cyst, fibroids of uterus and heavy periods, a repeat pelvic ultrasound was ordered today.  Based on results we will make further recommendations if needs to see gynecology.    After the visit hemoglobin came back showing anemia with hemoglobin 9.8 and MCH MCV suggestive of iron deficiency, iron and ferritin pending.  Suspect from heavy periods.  Currently asymptomatic.  Will recommend increase iron in diet, consider taking over-the-counter iron daily and recheck in 3 months with CBC iron ferritin B12 reticulocyte peripheral smear.  If desires to see gynecology for heavy periods's,  we can put in a referral as well.  [employer] message was sent to her about this    For mole on nose and on right leg and multiple pigmented nevi on skin of face,  "neck, back, chest, abdomen & extremities,  referred to dermatology for skin check and mole removal.    Family history of esophageal cancer and colonic polyps.  Increase fiber in diet and referred to GI for screening colonoscopy.  Consider  consultation    Family history of factor V Leiden mutation iron deficiency unclear.  We will test today after genetic form signed as it may help make recommendations in the future with regard to heavy periods and hormonal treatment.  If positive for clotting disorder would avoid estrogen containing compounds.     Return in an office visit in near future for a Pap, otherwise in 1 year for a preventive physical or sooner for any new concerns.    Patient has been advised of split billing requirements and indicates understanding: Yes  COUNSELING:  Reviewed preventive health counseling, as reflected in patient instructions       Regular exercise       Healthy diet/nutrition       Vision screening       Immunizations       Alcohol Use       Family planning       Osteoporosis prevention/bone health       Safe sex practices/STD prevention       Colon cancer screening       Consider Hep C screening for all patients one time for ages 18-79 years       The ASCVD Risk score (Occoquanfrancisco j LORENZANA Jr., et al., 2013) failed to calculate for the following reasons:    Cannot find a previous HDL lab    Cannot find a previous total cholesterol lab       Advance Care Planning    Estimated body mass index is 32.18 kg/m  as calculated from the following:    Height as of this encounter: 1.619 m (5' 3.75\").    Weight as of this encounter: 84.4 kg (186 lb).    Weight management plan: Discussed healthy diet and exercise guidelines    She reports that she quit smoking about 12 years ago. Her smoking use included cigarettes. She started smoking about 23 years ago. She has a 5.50 pack-year smoking history. She has never used smokeless tobacco.      Counseling Resources:  ATP IV Guidelines  Pooled Cohorts " Equation Calculator  Breast Cancer Risk Calculator  BRCA-Related Cancer Risk Assessment: FHS-7 Tool  FRAX Risk Assessment  ICSI Preventive Guidelines  Dietary Guidelines for Americans, 2010  USDA's MyPlate  ASA Prophylaxis  Lung CA Screening    Kerrie Proctor MD  [facility]

## 2022-01-17 NOTE — RESULT ENCOUNTER NOTE
Sera Ms. Arrington,  Your results came back showing:  The 10-year ASCVD risk score (Kulwinder LORENZANA Jr., et al., 2013) is: 1.3%    Values used to calculate the score:      Age: 41 years      Sex: Female      Is Non- : No      Diabetic: No      Tobacco smoker: No      Systolic Blood Pressure: 120 mmHg      Is BP treated: No      HDL Cholesterol: 43 mg/dL      Total Cholesterol: 242 mg/dL  -LDL(bad) cholesterol level is elevated, HDL(good) cholesterol level is low and your triglycerides are elevated which can increase your heart disease risk.  Thankfully your overall cardiovascular risk is low.   A diet high in fat and simple carbohydrates, genetics and being overweight can contribute to this. ADVISE: exercising 150 minutes of aerobic exercise per week (30 minutes for 5 days per week or 50 minutes for 3 days per week are options), eating a low saturated fat/low carbohydrate diet, and omega-3 fatty acids (fish oil) 0716-6670 mg daily are helpful to improve this. In 12 months, you should recheck your fasting cholesterol panel.  -Liver and gallbladder tests (AST, Alk phos,bilirubin) are normal but AST 1 liver test is mildly elevated at 56.  This could be from elevated cholesterol.  -Kidney function (GFR) is normal.  -Sodium is normal.  -Potassium is normal.  -Calcium is normal.  -Glucose is slight elevated and may be a sign of early diabetes (prediabetes). ADVISE:: eating a low carbohydrate diet, exercising, trying to lose weight (if necessary) and rechecking your glucose level in 12 months.  -TSH (thyroid stimulating hormone) level is normal which indicates normal thyroid function.  -Low iron store levels (ferritin).  ADVISE: increasing iron in your diet and consider taking iron supplement for 3 months daily - to avoid constipation from the supplement you should increase fluid intake and fiber in your diet. Also, recheck your labs in 2 to 3 months. I have put some orders in place and you can schedule a  lab only appointment.  If you have any further concerns please do not hesitate to contact us by message, phone or making an appointment.  Have a good day   Dr Hitesh MARIE

## 2022-01-17 NOTE — PATIENT INSTRUCTIONS
Seen for preventive health and additional concerns today   Self breast check regularly   Consider referral to  given fh of esophageal cancer , colon polyps, factor 5 if insurance will cover  Mammogram due and referral placed  Return for Pelvic / PAP when not on period  Healthcare maintenance reviewed.  Consider working on healthcare directives, honoring choices given.  COVID-vaccine including booster and flu shot up-to-date.  Labs today and will make further recommendations once reviewed    BMI 32: Good to hear that health will be a priority this year.  Continue with healthy diet, frequent small meals, less carbohydrates more Mediterranean-style.  Try to lose at least 10% of total body weight to be healthier.    Due to prior left ovarian cyst, fibroids of uterus and heavy periods a repeat pelvic ultrasound was ordered today.  Based on results we will make further recommendations if needs to see gynecology.    For mole on nose and on right leg and multiple pigmented nevi on skin of face neck back chest abdomen extremities refer to dermatology for skin check and mole removal.    Family history of esophageal cancer and colonic polyps.  Increase fiber in diet and referred to GI for screening colonoscopy.    Family history of factor V Leiden mutation iron deficiency unclear.  We will test today after genetic form signed as it may help make recommendations in the future with regard to heavy periods and hormonal treatment.    Return in an office visit in near future for a Pap, otherwise in 1 year for a preventive physical or sooner for any new concerns.    Preventive Health Recommendations  Female Ages 40 to 49    Yearly exam:     See your health care provider every year in order to  1. Review health changes.   2. Discuss preventive care.    3. Review your medicines if your doctor prescribed any.      Get a Pap test every three years (unless you have an abnormal result and your provider advises testing more  often).      If you get Pap tests with HPV test, you only need to test every 5 years, unless you have an abnormal result. You do not need a Pap test if your uterus was removed (hysterectomy) and you have not had cancer.      You should be tested each year for STDs (sexually transmitted diseases), if you're at risk.     Ask your doctor if you should have a mammogram.      Have a colonoscopy (test for colon cancer) if someone in your family has had colon cancer or polyps before age 50.       Have a cholesterol test every 5 years.       Have a diabetes test (fasting glucose) after age 45. If you are at risk for diabetes, you should have this test every 3 years.    Shots: Get a flu shot each year. Get a tetanus shot every 10 years.     Nutrition:     Eat at least 5 servings of fruits and vegetables each day.    Eat whole-grain bread, whole-wheat pasta and brown rice instead of white grains and rice.    Get adequate Calcium and Vitamin D.      Lifestyle    Exercise at least 150 minutes a week (an average of 30 minutes a day, 5 days a week). This will help you control your weight and prevent disease.    Limit alcohol to one drink per day.    No smoking.     Wear sunscreen to prevent skin cancer.    See your dentist every six months for an exam and cleaning.

## 2022-01-20 NOTE — RESULT ENCOUNTER NOTE
Sera Ms. Arrington,  Your results came back and are within acceptable limits. The test came back negative for a factor 5 mutation.    If you have any further concerns please do not hesitate to contact us by message, phone or making an appointment.  Have a good day   Dr Hitesh MARIE

## 2022-01-28 ENCOUNTER — MYC MEDICAL ADVICE (OUTPATIENT)
Dept: FAMILY MEDICINE | Facility: CLINIC | Age: 42
End: 2022-01-28

## 2022-01-28 ENCOUNTER — ANCILLARY PROCEDURE (OUTPATIENT)
Dept: MAMMOGRAPHY | Facility: CLINIC | Age: 42
End: 2022-01-28
Attending: FAMILY MEDICINE
Payer: COMMERCIAL

## 2022-01-28 DIAGNOSIS — Z83.719 FAMILY HISTORY OF COLONIC POLYPS: Primary | ICD-10-CM

## 2022-01-28 DIAGNOSIS — Z00.00 ROUTINE HISTORY AND PHYSICAL EXAMINATION OF ADULT: ICD-10-CM

## 2022-01-28 PROCEDURE — 77067 SCR MAMMO BI INCL CAD: CPT | Mod: TC | Performed by: RADIOLOGY

## 2022-02-02 ENCOUNTER — TELEPHONE (OUTPATIENT)
Dept: GASTROENTEROLOGY | Facility: CLINIC | Age: 42
End: 2022-02-02
Payer: COMMERCIAL

## 2022-02-02 NOTE — TELEPHONE ENCOUNTER
Screening Questions  Blue=prep questions Red=location Green=sedation   1. Are you active on mychart? Y    2. What insurance is in the chart? BCBS      3.  Ordering/Referring Provider: Kerrie Proctor MD       4. BMI 30.9, If greater than 40 review exclusion criteria also will need EXTENDED PREP    5.  Respiratory Screening (If yes to any of the following HOSPITAL setting only):     Do you use daily home oxygen? N  Do you have mod to severe Obstructive Sleep Apnea? N (can be seen at [facility] or hospital setting)    Do you have Pulmonary Hypertension? N   Do you have UNCONTROLLED asthma? N    6. Have you had a heart or lung transplant? N  (If yes, please review exclusion criteria)    7. Are you currently on dialysis?N  (If yes, schedule in HOSPITAL setting only)(If yes, please send Golytely prep)    8. Do you have chronic kidney disease? N (If yes, please send Golytely prep)    9. Have you had a stroke or Transient ischemic attack (TIA) within 6 months? N (If yes, do not schedule at [facility])    10. In the past 6 months, have you had any heart related issues including cardiomyopathy or heart attack? N (If yes, please review exclusion criteria)           If yes, did it require cardiac stenting or other implantable device?  (If yes, please review exclusion criteria)      11. Do you have any implantable devices in your body (pacemaker, defib, LVAD)? N (If yes, schedule at [facility])    12. Do you take nitroglycerin? If yes, how often? N (if yes, schedule at HOSPITAL setting)    13. Are you currently taking any blood thinners?N (If yes- inform patient to follow up with PCP or provider for follow up instructions)     14. Are you a diabetic? N (If yes, please send Golytely prep)    15. (Females) Are you currently pregnant? N  If yes, how many weeks?      16. Are you taking any prescription pain medications on a routine schedule? N If yes, MAC sedation and patient will need EXTENDED PREP.    17. Do you have any chemical dependencies such as  alcohol, street drugs, or methadone? N If yes, MAC sedation     18. Do you have any history of post-traumatic stress syndrome, severe anxiety or history of psychosis? N  If yes, MAC sedation.     19. Do you transfer independently? Y    20.  Do you have any issues with constipation? N   If yes, pt will need EXTENDED PREP     21. Preferred Pharmacy for Pre Prescription [facility] - [place], [place] - [address]    Scheduling Details    Which Colonoscopy Prep was Sent?: Miralax  Type of Procedure Scheduled: Colon  Surgeon: Nils  Date of Procedure: 2/18/22  Location: [facility]  Caller (Please ask for phone number if not scheduled by patient): Arabella Arrington      Sedation Type: MAC  Conscious Sedation- Needs  for 6 hours after the procedure  MAC/General-Needs  for 24 hours after procedure    Pre-op Required at [facility] [place] [place] and OR for MAC sedation:   (if yes advise patient they will need a pre-op prior to procedure)      Informed patient they will need an adult  y  Cannot take any type of public or medical transportation alone    Pre-Procedure Covid test to be completed at [facility] or Externally:  2/15     Confirmed Nurse will call to complete assessment y    Additional comments:  (DE NISA'S PATIENTS NEED EXTENDED PREP)

## 2022-02-03 DIAGNOSIS — Z11.59 ENCOUNTER FOR SCREENING FOR OTHER VIRAL DISEASES: Primary | ICD-10-CM

## 2022-02-08 ENCOUNTER — ANCILLARY PROCEDURE (OUTPATIENT)
Dept: ULTRASOUND IMAGING | Facility: CLINIC | Age: 42
End: 2022-02-08
Attending: FAMILY MEDICINE
Payer: COMMERCIAL

## 2022-02-08 DIAGNOSIS — D25.1 INTRAMURAL LEIOMYOMA OF UTERUS: ICD-10-CM

## 2022-02-08 DIAGNOSIS — N83.202 CYST OF LEFT OVARY: ICD-10-CM

## 2022-02-08 PROCEDURE — 76830 TRANSVAGINAL US NON-OB: CPT | Performed by: OBSTETRICS & GYNECOLOGY

## 2022-02-08 PROCEDURE — 76856 US EXAM PELVIC COMPLETE: CPT | Performed by: OBSTETRICS & GYNECOLOGY

## 2022-02-09 NOTE — RESULT ENCOUNTER NOTE
Sera Ms. Arrington,  Your results came back   The pelvic ultrasound showed a large fibroid filled uterus making visualization a bit difficult.   This is likely contributing to your heavy periods & anemia. Two of the largest were almost 9 cm each. The ovaries were not well visualized probably due to distortion from the fibroids & instead the u/s picked up a couple tubular structures bilaterally. Radiology recommended a gyn consult regarding this & I see you are planned to see them on the 11th ( in 2 days) which is excellent   If you have any further concerns please do not hesitate to contact us by message, phone or making an appointment.  Have a good day   Dr Hitesh MARIE

## 2022-02-11 ENCOUNTER — TELEPHONE (OUTPATIENT)
Dept: GASTROENTEROLOGY | Facility: CLINIC | Age: 42
End: 2022-02-11

## 2022-02-11 ENCOUNTER — VIRTUAL VISIT (OUTPATIENT)
Dept: OBGYN | Facility: CLINIC | Age: 42
End: 2022-02-11
Attending: FAMILY MEDICINE
Payer: COMMERCIAL

## 2022-02-11 DIAGNOSIS — D25.9 UTERINE LEIOMYOMA, UNSPECIFIED LOCATION: Primary | ICD-10-CM

## 2022-02-11 PROCEDURE — 99213 OFFICE O/P EST LOW 20 MIN: CPT | Mod: 95 | Performed by: OBSTETRICS & GYNECOLOGY

## 2022-02-11 NOTE — TELEPHONE ENCOUNTER
Attempted to contact patient regarding upcoming colonoscopy procedure on 2.18.2022 for pre assessment questions. No answer.     Left message to return call to [phone] #[phone]    Covid test scheduled: 2.15.2022    Arrival time: 0900    Facility location: [facility]    Sedation type: MAC    Indication for procedure: screening colonoscopy; family hx of polyps    Referring provider: Kerrie Proctor MD    Bowel prep recommendation: Miralax/Magnesium citrate/Dulcolax      Astrid Wallis RN

## 2022-02-11 NOTE — PROGRESS NOTES
Telemedicine Visit: The patient's condition can be safely assessed and treated via synchronous audio telemedicine encounter.      Reason for Telemedicine Visit: Patient has requested telehealth visit    Originating Site (Patient Location): Patient's home    Distant Site (Provider Location): [facility]: [place]     Consent:  The patient/guardian has verbally consented to: the potential risks and benefits of telemedicine versus in person care; bill my insurance or make self-payment for services provided; and responsibility for payment of non-covered services.     Mode of Communication:  Telephone call     As the provider I attest to compliance with applicable laws and regulations related to telemedicine.    CC: F/U uterine fibroids    HPI: Arabella Arrington is a 40 YO G0 who presents for follow up for symptomatic uterine fibroids. She was first diagnosed with uterine fibroids in 2018 during an evaluation for heavy menstrual bleeding. At the time she was counseled on options for treatment including medical and surgical management but the patient preferred expectant management. She reports that she has continued to have heavy vaginal bleeding and cramping with her periods. She will also occasionally have severe and sudden onset RLQ abdominal pain. She is feeling some abdominal distention but denies nausea, changes in appetite, early satiety or urinary frequency but she does have some intermittent constipation.  She is not planning on pregnancy in the near future.    Gynecological Ultrasound Report  Pelvic U/S - Transabdominal and Transvaginal   [facility]  Referring Provider: Kerrie Proctor MD  Sonographer:  Kira Pennington RDMS  Indication: Fibroids  LMP: Patient's last menstrual period was 01/14/2022 (approximate).     Gynecological Ultrasonography:   Uterus:  ? anteverted . Contour is irregular w/ myomata: 1 Pedunculated  above umbilicus 8.9 x 8.9 x 7.6 cm,  2 Pedunculated 9.5 x 7.6 x 8.0 cm, and others smaller.  Size: 16.2 x 12.0 x 8.0 cm  Endometrium: Thickness Total 14.5 mm     Right Ovary:  Not visualized, tubular adnexal mass = 11.8x 11.2x 6.3cm  Left Ovary:  Not visualized, heterogeneous mass = 6.8x 6.3x 6.0cm  Cul de Sac Free Fluid: No free fluid     Impression:   Difficult visualization due to large myomatous uterus.  Two largest fibroids measured are pedunculated, measuring 8.9cm and 9.5cm respectively.  Right ovary not clearly visualized, but a tubular mass was seen in right adnexa measuring 11.8 x 11.2 x 6.3cm.  Left ovary also not clearly visualized, but heterogenous mass seen in left adnexa measuring up to 6.8cm.     Consider referral to gynecology.     Barbara Burton MD    Assessment and plan:   Arabella Arrington is a 40 YO  who presents for follow up of uterine fibroids  -We reviewed the ultrasound findings suggestive of two large uterine fibroids and multiple smaller fibroids, slightly larger compared to prior ultrasound in 2018 as well as a complex left ovarian mass a tubular mass in the right adnexa.   -We reviewed the differential diagnoses for adnexal masses including benign and malignant ovarian masses, a exophytic fibroid and a hydrosalpinx/paratubal cyst.  I recommended a pelvic MRI to better characterize these structures.   -We discussed options for surgical management and given that she is not planning for pregnancy, I would recommend proceeding with hysterectomy but would like to see the MRI results prior to determining a final surgical plan.   -I would also recommend a EMB to evaluate the uterine lining prior to proceeding with surgery.     Dispo: RTC for endometrial biopsy following pelvic MRI  Time spent on telephone call: 11 min     Kasandra Multani MD

## 2022-02-11 NOTE — TELEPHONE ENCOUNTER
Patient returned call.    Pre assessment questions completed for upcoming colonoscopy procedure scheduled on 2/18/22    COVID test scheduled 2/15/22    Reviewed procedural arrival time 0900 and facility location [facility].    Designated  policy reviewed. Instructed to have someone stay 24 hours post procedure.     Reviewed Miralax prep instructions with patient. No fiber/iron supplements or foods that contain nuts/seeds 7 days prior to procedure.     Anticoagulation/blood thinners? no    Electronic implanted devices? no    Patient verbalized understanding and had no questions or concerns at this time.    Roro Avendano RN

## 2022-02-15 ENCOUNTER — LAB (OUTPATIENT)
Dept: LAB | Facility: CLINIC | Age: 42
End: 2022-02-15
Payer: COMMERCIAL

## 2022-02-15 DIAGNOSIS — Z11.59 ENCOUNTER FOR SCREENING FOR OTHER VIRAL DISEASES: ICD-10-CM

## 2022-02-15 LAB — SARS-COV-2 RNA RESP QL NAA+PROBE: NEGATIVE

## 2022-02-15 PROCEDURE — U0005 INFEC AGEN DETEC AMPLI PROBE: HCPCS

## 2022-02-15 PROCEDURE — U0003 INFECTIOUS AGENT DETECTION BY NUCLEIC ACID (DNA OR RNA); SEVERE ACUTE RESPIRATORY SYNDROME CORONAVIRUS 2 (SARS-COV-2) (CORONAVIRUS DISEASE [COVID-19]), AMPLIFIED PROBE TECHNIQUE, MAKING USE OF HIGH THROUGHPUT TECHNOLOGIES AS DESCRIBED BY CMS-2020-01-R: HCPCS

## 2022-02-18 ENCOUNTER — DOCUMENTATION ONLY (OUTPATIENT)
Dept: GASTROENTEROLOGY | Facility: OUTPATIENT CENTER | Age: 42
End: 2022-02-18
Payer: COMMERCIAL

## 2022-02-18 ENCOUNTER — TRANSFERRED RECORDS (OUTPATIENT)
Dept: HEALTH INFORMATION MANAGEMENT | Facility: CLINIC | Age: 42
End: 2022-02-18
Payer: COMMERCIAL

## 2022-02-18 DIAGNOSIS — Z00.00 ROUTINE HISTORY AND PHYSICAL EXAMINATION OF ADULT: ICD-10-CM

## 2022-02-18 DIAGNOSIS — Z83.719 FAMILY HISTORY OF COLONIC POLYPS: ICD-10-CM

## 2022-02-18 DIAGNOSIS — Z12.11 SPECIAL SCREENING FOR MALIGNANT NEOPLASMS, COLON: ICD-10-CM

## 2022-02-23 ENCOUNTER — MYC MEDICAL ADVICE (OUTPATIENT)
Dept: OBGYN | Facility: CLINIC | Age: 42
End: 2022-02-23
Payer: COMMERCIAL

## 2022-03-07 ENCOUNTER — OFFICE VISIT (OUTPATIENT)
Dept: FAMILY MEDICINE | Facility: CLINIC | Age: 42
End: 2022-03-07
Payer: COMMERCIAL

## 2022-03-07 VITALS
HEIGHT: 64 IN | TEMPERATURE: 97.2 F | WEIGHT: 185 LBS | RESPIRATION RATE: 16 BRPM | OXYGEN SATURATION: 98 % | BODY MASS INDEX: 31.58 KG/M2 | SYSTOLIC BLOOD PRESSURE: 118 MMHG | HEART RATE: 72 BPM | DIASTOLIC BLOOD PRESSURE: 74 MMHG

## 2022-03-07 DIAGNOSIS — Z87.42 HISTORY OF OVARIAN CYST: ICD-10-CM

## 2022-03-07 DIAGNOSIS — D50.0 IRON DEFICIENCY ANEMIA DUE TO CHRONIC BLOOD LOSS: ICD-10-CM

## 2022-03-07 DIAGNOSIS — Z83.2 FAMILY HISTORY OF FACTOR V LEIDEN MUTATION: ICD-10-CM

## 2022-03-07 DIAGNOSIS — N92.0 MENORRHAGIA WITH REGULAR CYCLE: ICD-10-CM

## 2022-03-07 DIAGNOSIS — Z12.4 CERVICAL CANCER SCREENING: Primary | ICD-10-CM

## 2022-03-07 DIAGNOSIS — Z83.719 FAMILY HISTORY OF COLONIC POLYPS: ICD-10-CM

## 2022-03-07 DIAGNOSIS — D25.1 INTRAMURAL LEIOMYOMA OF UTERUS: ICD-10-CM

## 2022-03-07 DIAGNOSIS — Z80.0 FAMILY HISTORY OF ESOPHAGEAL CANCER: ICD-10-CM

## 2022-03-07 DIAGNOSIS — R73.03 PREDIABETES: ICD-10-CM

## 2022-03-07 PROCEDURE — 87624 HPV HI-RISK TYP POOLED RSLT: CPT | Performed by: FAMILY MEDICINE

## 2022-03-07 PROCEDURE — G0145 SCR C/V CYTO,THINLAYER,RESCR: HCPCS | Performed by: FAMILY MEDICINE

## 2022-03-07 PROCEDURE — 99214 OFFICE O/P EST MOD 30 MIN: CPT | Performed by: FAMILY MEDICINE

## 2022-03-07 RX ORDER — PSYLLIUM HUSK 100 %
POWDER (GRAM) MISCELLANEOUS
COMMUNITY
Start: 2022-01-18 | End: 2022-06-03

## 2022-03-07 RX ORDER — CHLORAL HYDRATE 500 MG
CAPSULE ORAL
COMMUNITY
Start: 2022-01-18 | End: 2022-10-06

## 2022-03-07 RX ORDER — FERROUS SULFATE 325(65) MG
TABLET ORAL
COMMUNITY
Start: 2022-01-18 | End: 2022-10-06

## 2022-03-07 NOTE — PROGRESS NOTES
Assessment & Plan     Cervical cancer screening  Pap smear obtained today.  Continue iron supplement daily and recheck iron labs & for celiac in 3 months.  Continue psyllium to prevent constipation from iron  Iron deficiency anemia suspected due to chronic loss due to heavy periods  Suspected due to large fibroids.  Visited with gynecology to get an MRI 3/12 and possibly an endometrial biopsy on 3/22 with gynecology.  Contemplating a hysterectomy.  History of ovarian cyst but unable to see ovaries on most recent ultrasound due to fibroid distortion.  Family history of factor V Leiden with a recent factor V testing was negative  Family history of dad having had a colectomy for many serrated adenoma polyps, sister also with history of colonic polyps.  Recent colonoscopy on 2/18/2022 was normal and recommended recheck in 10 years.  GI did recommend checking for celiac and this will be added to future labs.  Given family history though would recommend  consult to assess risk of a genetic polyposis condition.  If unable to do genetic testing consider colonoscopy every 5 years instead to be safe.  Genetic testing may be helpful also given family history of esophageal cancer  Continue omega for mildly elevated LDL.  Overall cardiovascular risk is low  Given history of prediabetes and BMI continue with fiber intake and eating healthy and losing weight to lower cardiovascular & cancer risk.  Will await to see the lab work in about 3 months or so and return in 6 months for diabetes check.  Can refer to hematology if hemoglobin does not go adequately up.  Situation may completely resolve not requiring any further work-up after hysterectomy  - Pap Screen with HPV - recommended age 30 - 65 years    Iron deficiency anemia due to chronic blood loss  Continue iron supplement daily and recheck iron labs & for celiac in 3 months.  Continue psyllium to prevent constipation from iron  Iron deficiency anemia suspected due to  chronic loss due to heavy periods  Suspected due to large fibroids.  Visited with gynecology to get an MRI 3/12 and possibly an endometrial biopsy on 3/22 with gynecology.  Contemplating a hysterectomy.  Recent colonoscopy on 2/18/2022 was normal and recommended recheck in 10 years.  GI did recommend checking for celiac and this will be added to future labs CBC, peripheral smear, iron, ferritin, B12 etc.  Can refer to hematology if hemoglobin does not go adequately up.  Situation may completely resolve not requiring any further work-up after hysterectomy  - Tissue transglutaminase martin IgA and IgG; Future    Menorrhagia with regular cycle  Intramural leiomyoma of uterus  History of ovarian cyst  alejo deficiency anemia suspected due to chronic loss due to heavy periods, Suspected due to large fibroids.  Visited with gynecology to get an MRI 3/12 and possibly an endometrial biopsy on 3/22 with gynecology.  Contemplating a hysterectomy.  History of ovarian cyst but unable to see ovaries on most recent ultrasound due to fibroid distortion.    Family history of factor V Leiden mutation  Negative for mutation.  This will help with postop care and with any HRT considered in the future.    Family history of colonic polyps  Recent colonoscopy on 2/18/2022 was normal and recommended recheck in 10 years.  GI did recommend checking for celiac and this will be added to future labs.  Given family history though would recommend  consult to assess risk of a genetic polyposis condition.  If unable to do genetic testing consider colonoscopy every 5 years instead to be safe.    Family history of esophageal cancer  Genetic testing may be helpful also given family history of esophageal cancer    Prediabetes  BMI 32.0-32.9,adult  Given history of prediabetes and BMI continue with fiber intake and eating healthy and losing weight to lower cardiovascular & cancer risk.    Ordering of each unique test  36 minutes spent on the date  of the encounter doing chart review, history and exam, documentation and further activities per the note     Work on weight loss  Regular exercise  See Patient Instructions    Return in about 6 months (around 9/7/2022), or if symptoms worsen or fail to improve, for Follow up, with me.    Kerrie Proctor MD  [facility] BRIA Bell is a 41 year old who presents for the following health issues     History of Present Illness     Reason for visit:  Scheduled pap    She eats 2-3 servings of fruits and vegetables daily.She consumes 0 sweetened beverage(s) daily.She exercises with enough effort to increase her heart rate 20 to 29 minutes per day.  She exercises with enough effort to increase her heart rate 4 days per week.   She is taking medications regularly.    BACKGROUND   41-year-old lady, with history of prior intramural leiomyomas and left ovarian cyst, BMI more than 32,history of prediabetes with hemoglobin A1c of 5.7,  history of iron deficiency anemia due to chronic blood loss, menorrhagia with regular cycles, intramural leiomyoma of uterus, history of prior ovarian cyst, family history of factor V Leiden mutation but personally tested negative, family history of dad with a colectomy history due to polyps and/serrated adenoma, colonoscopy done in 2022 in self was normal, family history of esophageal cancer currently opting to defer  consult,  previously under care of Barbara Ha, seen by GYN last in 2018 for fibroids following episode of acute pain that made her seek ER care & ct scan ordered. Was told fibroids were unlikely cause of the acute severe pain at that time.Options discussed and was to think about it and get back to gynecology at that time.  [place]  negative.    Seen first time by this provider on 1/17/22 for preventive health and additional concerns. Discussed self breast check regularly   To consider referral to  given fh of esophageal  cancer , colon polyps, factor 5 if insurance  Would cover. Mammogram due and referral placed. Was to return for a Pelvic / PAP when not on her period. Healthcare maintenance reviewed. To consider working on healthcare directives, & honoring choices given. Noted COVID-vaccine including booster and flu shot up-to-date. Labs done   BMI 32:  Encouraged a healthy diet, frequent small meals, less carbohydrates more Mediterranean-style.  To try to lose at least 10% of total body weight over time to be healthier.  After the visit hemoglobin A1c came back elevated at 5.7 suggesting prediabetes, recommended rechecking in 6 months to 1 year  Due to prior left ovarian cyst, fibroids of uterus and heavy periods, a repeat pelvic ultrasound was ordered today.  After the visit hemoglobin came back showing anemia with hemoglobin 9.8 and MCH MCV suggestive of iron deficiency, iron and ferritin came back low with ferritin of 9 & iron of 16, suspected from heavy periods.  Was asymptomatic.  Recommended to increase iron in diet, consider taking over-the-counter iron daily and recheck in 3 months with CBC, iron, ferritin, B12, reticulocyte & peripheral smear.   For mole on nose and on right leg and multiple pigmented nevi on skin of face, neck, back, chest, abdomen & extremities,  referred to dermatology for skin check and mole removal.  Discussed family history of esophageal cancer and colonic polyps.  Advised to Increase fiber in diet and referred to GI for screening colonoscopy.  Was to consider a  consultation  Family history of factor V Leiden mutation  & testing done after genetic form signed as it may help make recommendations in the future with regard to heavy periods and hormonal treatment.  If positive for clotting disorder would avoid estrogen containing compounds.   Labs came back showing a mildly elevated LDL with ASCVD risk of 2%, CMP normal TSH normal ferritin 9 iron 16, hemoglobin 9.8, AST minimally elevated,  glucose minimally elevated, hemoglobin A1c 5.7.  Mammogram done 1/28 was normal.  Pelvic ultrasound on 2/8/2022 showed fibroid filled uterus ovaries not visible and referred to gynecology.  Seen by gynecology virtually on 2/11/2022 and advised an MRI scheduled for 12 March and an EMB scheduled for the 22nd and considering hysterectomy given has no plans to start a family.  Colonoscopy done 2/18/2022 was normal recommended recheck in 10 years and advised to find out more information on dad's reason for colectomy given history of polyps to see if genetic testing could be done and also to check for celiac given iron deficiency anemia even if probably due to heavy menses.    CURRENTLY   Here today for a Pap smear.  LMP 2/12/2022.  Currently no symptoms.  Iron deficiency anemia due to chronic blood loss related to menorrhagia with regular cycle currently on iron and Metamucil.  Is not been 3 months yet from starting iron pills as it took a while to start and then had to stop a week before colonoscopy and only recently just restarted so will come in for recheck labs when she reaches the 3-month jonna for being on iron.   Intramural leiomyoma of uterus history of prior ovarian cyst recent pelvic ultrasound.  Intermittent abdominal pain has plans to get an MRI pelvis on 12 March and then follows up with Gyn on the 22nd when possibly might get an EMB and plan for hysterectomy.  Family history of factor V Leiden mutation her testing came back negative.  Family history of colonic polyps a colonoscopy recently was normal and advise recheck in 10 years and get more information on dad's genetic testing if any and to check her for celiac.  Family history of esophageal cancer currently opts no  consultation.  Prediabetes BMI 32 stable on fish oil working on lifestyle and diet.    Review of Systems   Constitutional, HEENT, cardiovascular, pulmonary, GI, , musculoskeletal, neuro, skin, endocrine and psych systems are  "negative, except as otherwise noted.      Objective    /74 (BP Location: Right arm, Patient Position: Sitting, Cuff Size: Adult Regular)   Pulse 72   Temp 97.2  F (36.2  C) (Temporal)   Resp 16   Ht 1.619 m (5' 3.75\")   Wt 83.9 kg (185 lb)   LMP 02/12/2022   SpO2 98%   BMI 32.01 kg/m    Body mass index is 32.01 kg/m .  Physical Exam   GENERAL: healthy, alert, no distress and obese  EYES: Eyes grossly normal to inspection, PERRL and conjunctivae and sclerae normal  RESP: lungs clear to auscultation - no rales, rhonchi or wheezes  CV: regular rate and rhythm, normal S1 S2, no S3 or S4, no murmur, click or rub, no peripheral edema and peripheral pulses strong  ABDOMEN: soft, nontender   (female): normal female external genitalia, normal urethral meatus , vaginal mucosa pink, moist, well rugated and difficult exam.  Distorted anatomy probably due to fibroids.  Cervix pointing backwards on the left.  Able to visualize it briefly and almost blind sweep Pap smear obtained.  MS: no gross musculoskeletal defects noted, no edema  SKIN: no suspicious lesions or rashes  NEURO: Normal strength and tone, mentation intact and speech normal  PSYCH: mentation appears normal, affect normal/bright    No results found for any visits on 03/07/22.          "

## 2022-03-07 NOTE — PATIENT INSTRUCTIONS
Pap smear obtained today.  Continue iron supplement daily and recheck iron labs & for celiac in 3 months.  Continue psyllium to prevent constipation from iron  Iron deficiency anemia suspected due to chronic loss due to heavy periods  Suspected due to large fibroids.  Visited with gynecology to get an MRI 3/12 and possibly an endometrial biopsy on 3/22 with gynecology.  Contemplating a hysterectomy.  History of ovarian cyst but unable to see ovaries on most recent ultrasound due to fibroid distortion.  Family history of factor V Leiden with a recent factor V testing was negative  Family history of dad having had a colectomy for many serrated adenoma polyps, sister also with history of colonic polyps.  Recent colonoscopy on 2/18/2022 was normal and recommended recheck in 10 years.  GI did recommend checking for celiac and this will be added to future labs.  Given family history though would recommend  consult to assess risk of a genetic polyposis condition.  If unable to do genetic testing consider colonoscopy every 5 years instead to be safe.  Genetic testing may be helpful also given family history of esophageal cancer  Continue omega for mildly elevated LDL.  Overall cardiovascular risk is low  Given history of prediabetes and BMI continue with fiber intake and eating healthy and losing weight to lower cardiovascular & cancer risk.  Will await to see the lab work in about 3 months or so and return in 6 months for diabetes check.  Can refer to hematology if hemoglobin does not go adequately up.  Situation may completely resolve not requiring any further work-up after hysterectomy

## 2022-03-10 LAB
BKR LAB AP GYN ADEQUACY: NORMAL
BKR LAB AP GYN INTERPRETATION: NORMAL
BKR LAB AP HPV REFLEX: NORMAL
BKR LAB AP LMP: NORMAL
BKR LAB AP PREVIOUS ABNORMAL: NORMAL
PATH REPORT.COMMENTS IMP SPEC: NORMAL
PATH REPORT.COMMENTS IMP SPEC: NORMAL
PATH REPORT.RELEVANT HX SPEC: NORMAL

## 2022-03-11 LAB
HUMAN PAPILLOMA VIRUS 16 DNA: NEGATIVE
HUMAN PAPILLOMA VIRUS 18 DNA: NEGATIVE
HUMAN PAPILLOMA VIRUS FINAL DIAGNOSIS: NORMAL
HUMAN PAPILLOMA VIRUS OTHER HR: NEGATIVE

## 2022-03-12 ENCOUNTER — HOSPITAL ENCOUNTER (OUTPATIENT)
Dept: MRI IMAGING | Facility: HOSPITAL | Age: 42
Discharge: HOME | End: 2022-03-12
Attending: OBSTETRICS & GYNECOLOGY | Admitting: OBSTETRICS & GYNECOLOGY
Payer: COMMERCIAL

## 2022-03-12 DIAGNOSIS — D25.9 UTERINE LEIOMYOMA, UNSPECIFIED LOCATION: ICD-10-CM

## 2022-03-12 PROCEDURE — 255N000002 HC RX 255 OP 636: Performed by: OBSTETRICS & GYNECOLOGY

## 2022-03-12 PROCEDURE — 72197 MRI PELVIS W/O & W/DYE: CPT

## 2022-03-12 PROCEDURE — A9585 GADOBUTROL INJECTION: HCPCS | Performed by: OBSTETRICS & GYNECOLOGY

## 2022-03-12 RX ORDER — GADOBUTROL 604.72 MG/ML
8 INJECTION INTRAVENOUS ONCE
Status: COMPLETED | OUTPATIENT
Start: 2022-03-12 | End: 2022-03-12

## 2022-03-12 RX ADMIN — GADOBUTROL 8 ML: 604.72 INJECTION INTRAVENOUS at 16:22

## 2022-03-22 ENCOUNTER — OFFICE VISIT (OUTPATIENT)
Dept: OBGYN | Facility: CLINIC | Age: 42
End: 2022-03-22
Payer: COMMERCIAL

## 2022-03-22 VITALS
TEMPERATURE: 97.3 F | BODY MASS INDEX: 32.2 KG/M2 | HEART RATE: 97 BPM | SYSTOLIC BLOOD PRESSURE: 133 MMHG | DIASTOLIC BLOOD PRESSURE: 86 MMHG | WEIGHT: 186.1 LBS | OXYGEN SATURATION: 97 %

## 2022-03-22 DIAGNOSIS — D25.9 UTERINE LEIOMYOMA, UNSPECIFIED LOCATION: Primary | ICD-10-CM

## 2022-03-22 DIAGNOSIS — N93.9 ABNORMAL UTERINE BLEEDING (AUB): ICD-10-CM

## 2022-03-22 LAB — HCG UR QL: NEGATIVE

## 2022-03-22 PROCEDURE — 81025 URINE PREGNANCY TEST: CPT | Performed by: OBSTETRICS & GYNECOLOGY

## 2022-03-22 PROCEDURE — 99213 OFFICE O/P EST LOW 20 MIN: CPT | Mod: 25 | Performed by: OBSTETRICS & GYNECOLOGY

## 2022-03-22 PROCEDURE — 58100 BIOPSY OF UTERUS LINING: CPT | Performed by: OBSTETRICS & GYNECOLOGY

## 2022-03-22 PROCEDURE — 88305 TISSUE EXAM BY PATHOLOGIST: CPT | Performed by: PATHOLOGY

## 2022-03-22 NOTE — PROGRESS NOTES
GYN Progress Note     CC: F/U uterine fibroids and abnormal uterine bleeding     HISTORY OF PRESENT ILLNESS:    Arabella Arrington is a 41 year old  who presents for follow up due to known uterine fibroids and abnormal uterine bleeding. She was seen for a virtual visit on 2022 and at that time reported worsening abdominal distention and constipation as well as progressively heavier and more painful periods. We reviewed her ultrasound and MRI findings which documented an enlarged uterus with three large subserosal or intramural fibroids measuring 7-12 cm as well as complex bilateral hematosalpines measuring 12 x 6 cm.     We discussed options for treatment of her uterine fibroids including hysterectomy, myomectomy and IR treatment with [place]. Following this discussion, she would like to proceed with hysterectomy. She is not planning pregnancy in the future.     OB HISTORY:  OB History    Para Term  AB Living   0 0 0 0 0 0   SAB IAB Ectopic Multiple Live Births   0 0 0 0 0         GYN HISTORY:  Denies hx of abnormal pap smears or STIs  NIL pap with negative HPV in 3/2022      PAST MEDICAL HISTORY:  Past Medical History:   Diagnosis Date     Cyst of left ovary 2016     none             PAST SURGICAL HISTORY:  Past Surgical History:   Procedure Laterality Date     no surgical history            CURRENT MEDICATIONS:   Current Outpatient Medications   Medication Sig Dispense Refill     ferrous sulfate (FEROSUL) 325 (65 Fe) MG tablet        fish oil-omega-3 fatty acids 1000 MG capsule        Psyllium Husk POWD               ALLERGIES:  Patient has no known allergies.         SOCIAL HISTORY:  Social History     Tobacco Use     Smoking status: Former Smoker     Packs/day: 0.50     Years: 11.00     Pack years: 5.50     Types: Cigarettes     Start date: 1999     Quit date: 11/15/2009     Years since quittin.3     Smokeless tobacco: Never Used   Substance Use Topics     Alcohol use: Yes      Comment: 0-1 drink weekly     Drug use: No            FAMILY HISTORY:  Family History   Problem Relation Age of Onset     Esophageal Cancer Mother 55        esophageal cancer     Blood Disease Mother         factor 5     Diabetes Father         type 2     Respiratory Father         emphasema     Colon Polyps Father         had colon removed     Diabetes Paternal Grandmother             REVIEW OF SYSTEMS:  See HPI        PHYSICAL EXAMINATION:  VS:/86   Pulse 97   Temp 97.3  F (36.3  C)   Wt 84.4 kg (186 lb 1.6 oz)   LMP 03/12/2022 (Exact Date)   SpO2 97%   Breastfeeding No   BMI 32.20 kg/m    Body mass index is 32.2 kg/m .    General: Patient alert and oriented, no acute distress  CV: no peripheral edema or cyanosis  Resp: normal respiratory effort and equal lung expansion  Abdomen: non-tender to light and deep palpation, uterine fibroid palpable 2 cm above umbilicus   : speculum exam deferred, uterus enlarged, 20 week size on exam, anteverted and minimally mobile on exam. No clear adnexal masses palpated but difficult to differentiate adnexa vs enlarged fibroid uterus.   Ext: non-tender, no edema    Endometrial Biopsy Procedure    After obtaining written consent from the patient and performing a time out a speculum was placed in the vagina. The cervix was visualized and prepped with betadine swabs. A tenaculum was used to grasp the cervix at the 12 o'clock position and a Pipelle was passed without difficulty. The uterus sounded to 12 cm. 2 passes were made and adequate tissue was obtained. The tenaculum was removed from the cervix which was noted to be hemostatic. The patient tolerated the procedure well. EBL minimal.      Laboratory values:  Component      Latest Ref Rng & Units 1/17/2022   WBC      4.0 - 11.0 10e3/uL 7.1   RBC Count      3.80 - 5.20 10e6/uL 4.64   Hemoglobin      11.7 - 15.7 g/dL 9.8 (L)   Hematocrit      35.0 - 47.0 % 33.5 (L)   MCV      78 - 100 fL 72 (L)   MCH      26.5 - 33.0 pg  21.1 (L)   MCHC      31.5 - 36.5 g/dL 29.3 (L)   RDW      10.0 - 15.0 % 16.5 (H)   Platelet Count      150 - 450 10e3/uL 302   % Neutrophils      % 63   % Lymphocytes      % 25   % Monocytes      % 8   % Eosinophils      % 3   % Basophils      % 1   % Immature Granulocytes      % 0   Absolute Neutrophils      1.6 - 8.3 10e3/uL 4.5   Absolute Lymphocytes      0.8 - 5.3 10e3/uL 1.8   Absolute Monocytes      0.0 - 1.3 10e3/uL 0.5   Absolute Eosinophils      0.0 - 0.7 10e3/uL 0.2   Absolute Basophils      0.0 - 0.2 10e3/uL 0.0   Absolute Immature Granulocytes      <=0.4 10e3/uL 0.0   TSH      0.40 - 4.00 mU/L 3.44   Hemoglobin A1C      0.0 - 5.6 % 5.7 (H)     Imaging findings:  EXAM: MR PELVIS (GYN) WO and W CONTRAST  LOCATION: [facility]  DATE/TIME: 3/12/2022 3:45 PM     INDICATION: Vaginal bleeding, US abnormal. Uterine fibroid, symptomatic. Soft tissue mass, pelvis, US/x-ray nondiagnostic.  COMPARISON: None.  TECHNIQUE: Routine MRI female pelvis protocol including T1 in/out phase, diffusion, thin section high resolution multiplane T2, and post contrast T1.  CONTRAST: 8 mL Gadavist     FINDINGS:      UTERUS: Large myomatous uterus. Subserosal fibroid off the fundus of the uterus measures 8.3 x 12.3 cm. Intramural fibroid on the right side measures 7.2 x 7.4 cm and broadly abuts but does not extend into the endometrium. Subserosal fibroid off the   fundus posteriorly measures 6.2 x 7.3 cm.     Several smaller intramural fibroids.     ENDOMETRIUM: 6 mm in thickness. Endometrium is homogeneously smooth, mildly distorted by the fibroids.     ADNEXA: Large tubular bilateral adnexal masses, both having a similar appearance and filled with bright T1 signal, either proteinaceous debris or some hemorrhagic debris, favor hemorrhagic debris. On the right side, this measures approximately 12 x 4.6   cm and on the left approximately 12 x 6.8 cm. Appearance most suggestive of complex bilateral  hematosalpinx.     Both ovaries lie lateral to the fluid-filled distended fallopian tubes and appear relatively normal.     ADDITIONAL FINDINGS: No free fluid in the pelvis. No lymphadenopathy.                                                                      IMPRESSION:  1.  Large myomatous uterus as above.     2.  Abnormal adnexal masses bilaterally most likely large hematosalpinx.     3.  Findings correspond to ultrasound.      ASSESSMENT:  Arabella Arrington is a 41 year old  who presents for evaluation of uterine fibroids and abnormal uterine bleeding       PLAN:  (D25.9) Uterine leiomyoma, unspecified location  (primary encounter diagnosis) and   (N93.9) Abnormal uterine bleeding (AUB)  -Management options reviewed with patient, given multiple uterine fibroids and large size I recommended proceeding with hysterectomy and Arabella was in agreement. We discussed the surgical plan for a total abdominal hysterectomy. I would also recommend bilateral salpingectomy/removal of bilateral hematosalpinx at the time of her hysterectomy. We discussed that the findings of hydro/heatosalpinx could be an indication of intraabdominal adhesions but she denies any history of abdominal surgery, abdominal/pelvic infections or STIs. We reviewed that if significant adhesions are present, that could complicate our procedure or require additional procedures to be preformed to restore normal anatomy and allow us to complete her hysterectomy.   -We reviewed the risks of surgery including risk of bleeding, infection, damage to the bowel/bladder/ureter or other surrounding structures. These complications could require additional procedures or repeat surgery. We also discussed the risk of poor wound healing, wound breakdown.   -I recommended that we do not remove the ovaries given her young age unless indicated based intraoperative findings and she is in agreement with that plan.   -We also discussed the role of students/residents in  the OR and in post-operative care and she is comfortable with their involvement.   -Endometrial biopsy obtained as noted above, will plan for pre-op clearance through her PCP.   -Hemoglobin 9.8, will plan for pre-op IV iron to improve blood count prior to surgery  -Message sent to OR schedulers for total abdominal hysterectomy, bilateral salpingectomy and cystoscopy     Kasandra Multani MD

## 2022-03-24 LAB
PATH REPORT.COMMENTS IMP SPEC: NORMAL
PATH REPORT.COMMENTS IMP SPEC: NORMAL
PATH REPORT.FINAL DX SPEC: NORMAL
PATH REPORT.GROSS SPEC: NORMAL
PATH REPORT.MICROSCOPIC SPEC OTHER STN: NORMAL
PATH REPORT.RELEVANT HX SPEC: NORMAL
PHOTO IMAGE: NORMAL

## 2022-03-25 ENCOUNTER — TELEPHONE (OUTPATIENT)
Dept: OBGYN | Facility: CLINIC | Age: 42
End: 2022-03-25
Payer: COMMERCIAL

## 2022-03-25 DIAGNOSIS — N92.0 MENORRHAGIA WITH REGULAR CYCLE: Primary | ICD-10-CM

## 2022-03-25 RX ORDER — TRANEXAMIC ACID 650 MG/1
1300 TABLET ORAL 3 TIMES DAILY
Qty: 30 TABLET | Refills: 3 | Status: SHIPPED | OUTPATIENT
Start: 2022-03-25 | End: 2022-03-30

## 2022-03-25 NOTE — TELEPHONE ENCOUNTER
Type of surgery: gyn  Location of surgery: [facility] OR  Date and time of surgery: 07/01/22 7:30AM  Surgeon: Rahul Multani  Pre-Op Appt Date: 06/03/22 Kerrie Proctor  Post-Op Appt Date: not needed   Packet sent out: Yes  Pre-cert/Authorization completed:  Not Applicable  Date: 03/25/22     [facility] OB/GYN Surgery Scheduler

## 2022-04-15 ENCOUNTER — LAB (OUTPATIENT)
Dept: LAB | Facility: CLINIC | Age: 42
End: 2022-04-15
Payer: COMMERCIAL

## 2022-04-15 DIAGNOSIS — D50.0 IRON DEFICIENCY ANEMIA DUE TO CHRONIC BLOOD LOSS: ICD-10-CM

## 2022-04-15 LAB
BASOPHILS # BLD AUTO: 0.1 10E3/UL (ref 0–0.2)
BASOPHILS NFR BLD AUTO: 1 %
EOSINOPHIL # BLD AUTO: 0.4 10E3/UL (ref 0–0.7)
EOSINOPHIL NFR BLD AUTO: 6 %
ERYTHROCYTE [DISTWIDTH] IN BLOOD BY AUTOMATED COUNT: 17 % (ref 10–15)
FERRITIN SERPL-MCNC: 38 NG/ML (ref 12–150)
HCT VFR BLD AUTO: 42.3 % (ref 35–47)
HGB BLD-MCNC: 13.4 G/DL (ref 11.7–15.7)
IMM GRANULOCYTES # BLD: 0 10E3/UL
IMM GRANULOCYTES NFR BLD: 0 %
IRON SATN MFR SERPL: 9 % (ref 15–46)
IRON SERPL-MCNC: 35 UG/DL (ref 35–180)
LYMPHOCYTES # BLD AUTO: 1.5 10E3/UL (ref 0.8–5.3)
LYMPHOCYTES NFR BLD AUTO: 24 %
MCH RBC QN AUTO: 26.1 PG (ref 26.5–33)
MCHC RBC AUTO-ENTMCNC: 31.7 G/DL (ref 31.5–36.5)
MCV RBC AUTO: 82 FL (ref 78–100)
MONOCYTES # BLD AUTO: 0.7 10E3/UL (ref 0–1.3)
MONOCYTES NFR BLD AUTO: 10 %
NEUTROPHILS # BLD AUTO: 3.7 10E3/UL (ref 1.6–8.3)
NEUTROPHILS NFR BLD AUTO: 59 %
PLATELET # BLD AUTO: 295 10E3/UL (ref 150–450)
RBC # BLD AUTO: 5.14 10E6/UL (ref 3.8–5.2)
RETICS # AUTO: 0.09 10E6/UL (ref 0.03–0.1)
RETICS/RBC NFR AUTO: 1.8 % (ref 0.5–2)
TIBC SERPL-MCNC: 411 UG/DL (ref 240–430)
VIT B12 SERPL-MCNC: 562 PG/ML (ref 193–986)
WBC # BLD AUTO: 6.2 10E3/UL (ref 4–11)

## 2022-04-15 PROCEDURE — 85025 COMPLETE CBC W/AUTO DIFF WBC: CPT

## 2022-04-15 PROCEDURE — 83550 IRON BINDING TEST: CPT

## 2022-04-15 PROCEDURE — 82728 ASSAY OF FERRITIN: CPT

## 2022-04-15 PROCEDURE — 86364 TISS TRNSGLTMNASE EA IG CLAS: CPT

## 2022-04-15 PROCEDURE — 36415 COLL VENOUS BLD VENIPUNCTURE: CPT

## 2022-04-15 PROCEDURE — 82607 VITAMIN B-12: CPT

## 2022-04-15 PROCEDURE — 85045 AUTOMATED RETICULOCYTE COUNT: CPT

## 2022-04-15 PROCEDURE — 85060 BLOOD SMEAR INTERPRETATION: CPT | Performed by: PATHOLOGY

## 2022-04-15 NOTE — RESULT ENCOUNTER NOTE
Sera Ms. Arrington,  Your results came back and iron levels have improved. Continue iron for now as you are doing.  If you have any further concerns please do not hesitate to contact us by message, phone or making an appointment.  Have a good day   Dr Hitesh MARIE

## 2022-04-15 NOTE — RESULT ENCOUNTER NOTE
Sera BridgesKam Arrington,  Your results came back and ferritin is improved. ( Iron stores)  If you have any further concerns please do not hesitate to contact us by message, phone or making an appointment.  Have a good day   Dr Hitesh MARIE

## 2022-04-15 NOTE — RESULT ENCOUNTER NOTE
Sera Ms. Arrignton,  Your results came back and are within acceptable limits. Hemoglobin now in normal range on iron. Continue same till recovered from your upcoming hysterectomy   If you have any further concerns please do not hesitate to contact us by message, phone or making an appointment.  Have a good day   Dr Hitesh MARIE

## 2022-04-16 NOTE — RESULT ENCOUNTER NOTE
Sera Muller Murphy,  Your results came back with a normal Vitamin B 12   If you have any further concerns please do not hesitate to contact us by message, phone or making an appointment.  Have a good day   Dr Hitesh MARIE

## 2022-04-18 LAB
TTG IGA SER-ACNC: 0.5 U/ML
TTG IGG SER-ACNC: <0.6 U/ML

## 2022-04-19 LAB
PATH REPORT.COMMENTS IMP SPEC: NORMAL
PATH REPORT.FINAL DX SPEC: NORMAL
PATH REPORT.MICROSCOPIC SPEC OTHER STN: NORMAL
PATH REPORT.MICROSCOPIC SPEC OTHER STN: NORMAL

## 2022-04-19 NOTE — RESULT ENCOUNTER NOTE
Sera Ms. Arrington,  Your results came back and are within acceptable limits. {Look normal since started iron. If you have any further concerns please do not hesitate to contact us by message, phone or making an appointment.  Have a good day   Dr Hitesh MARIE

## 2022-04-27 NOTE — PROGRESS NOTES
[facility] Dermatology Note  Encounter Date: Apr 28, 2022  Office Visit     Dermatology Problem List:  # NUB, right nasal tip, s/p shave bx 4/28/2022   ____________________________________________    Assessment & Plan:    # NUB, right nasal tip, biopsy ddx benign irritated nevus vs other  - Shave biopsy today, see procedure below    # Benign lesions: Multiple benign nevi, seborrheic keratoses, cherry angiomas, dermatofibromas. Explained to patient benign nature of lesions. No treatment is necessary at this time unless the lesion changes or becomes symptomatic.   - ABCDs of melanoma were discussed and self skin checks were advised.  - Sun precaution was advised including the use of sun screens of SPF 30 or higher, sun protective clothing, and avoidance of tanning beds.     Procedures Performed:   - Shave biopsy procedure note, location(s): see above. After discussion of benefits and risks including but not limited to bleeding, infection, scar, incomplete removal, recurrence, and non-diagnostic biopsy, written consent and photographs were obtained. The area was cleaned with isopropyl alcohol. 0.5mL of 1% lidocaine with epinephrine was injected to obtain adequate anesthesia of lesion(s). Shave biopsy at site(s) performed. Hemostasis was achieved with aluminium chloride. Petrolatum ointment and a sterile dressing were applied. The patient tolerated the procedure and no complications were noted. The patient was provided with verbal and written post care instructions.     Follow-up: 3 year(s) in-person, or earlier for new or changing lesions    Staff and Scribe:     Scribe Disclosure:  I, Bernard Acuna, am serving as a scribe to document services personally performed by Paulette Zepeda PA-C based on data collection and the provider's statements to me.     Provider Disclosure:   The documentation recorded by the scribe accurately reflects the services I personally performed and the decisions made  by me.    All risks, benefits and alternatives were discussed with patient.  Patient is in agreement and understands the assessment and plan.  All questions were answered.    Paulette Zepeda PA-C, MPAS  [facility]: Phone: [phone], Fax: [phone]  [facility]: Phone: [phone],  Fax: [phone]  [facility]: Phone: [phone], Fax: [phone]  ____________________________________________    CC: Skin Check (A few moles of concern)    HPI:  Ms. Arabella Arrington is a(n) 41 year old female who presents today as a new patient for FBSE. Patient reports bothersome spots on her nose and leg that she would like examined. Denies family history of melanoma. Reports tanning bed use 1 time during high school. No personal hx skin cancer.     Patient is otherwise feeling well, without additional skin concerns.    Labs Reviewed:  N/A    Physical Exam:  Vitals: LMP 03/12/2022 (Exact Date)   SKIN: Total skin excluding the undergarment areas was performed. The exam included the head/face, neck, both arms, chest, back, abdomen, both legs, digits and/or nails.   - Rogers skin type 2  - On the right nasal tip, 4 mm pink and brown soft papule.  - There are dome shaped bright red papules on the trunk and extremities.   - Multiple regular brown pigmented macules and papules are identified on the trunk and extremities.   - There are waxy stuck on tan to brown papules on the trunk and extremities.   - There are firm tan/flesh colored papule that dimple with lateral pressure on the right calf.   - No other lesions of concern on areas examined.     Medications:  Current Outpatient Medications   Medication     ferrous sulfate (FEROSUL) 325 (65 Fe) MG tablet     fish oil-omega-3 fatty acids 1000 MG capsule     Psyllium Husk POWD     tranexamic acid (LYSTEDA) 650 MG tablet     No current facility-administered  medications for this visit.      Past Medical History:   Patient Active Problem List   Diagnosis     Intramural leiomyoma of uterus     BMI 32.0-32.9,adult     Family history of esophageal cancer     Family history of colonic polyps     Menorrhagia with regular cycle     Family history of factor V Leiden mutation     Iron deficiency anemia due to chronic blood loss     Prediabetes     Past Medical History:   Diagnosis Date     Cyst of left ovary 1/12/2016     none         CC Kerrie Proctor MD  [address] [place] [address] on close of this encounter.

## 2022-04-28 ENCOUNTER — OFFICE VISIT (OUTPATIENT)
Dept: DERMATOLOGY | Facility: CLINIC | Age: 42
End: 2022-04-28
Attending: FAMILY MEDICINE
Payer: COMMERCIAL

## 2022-04-28 DIAGNOSIS — D23.9 DERMATOFIBROMA: ICD-10-CM

## 2022-04-28 DIAGNOSIS — D48.9 NEOPLASM OF UNCERTAIN BEHAVIOR: ICD-10-CM

## 2022-04-28 DIAGNOSIS — L98.9 SKIN LESION: ICD-10-CM

## 2022-04-28 DIAGNOSIS — L82.1 SEBORRHEIC KERATOSIS: ICD-10-CM

## 2022-04-28 DIAGNOSIS — D18.01 CHERRY ANGIOMA: ICD-10-CM

## 2022-04-28 DIAGNOSIS — D22.9 MULTIPLE BENIGN NEVI: Primary | ICD-10-CM

## 2022-04-28 PROCEDURE — 11102 TANGNTL BX SKIN SINGLE LES: CPT | Performed by: PHYSICIAN ASSISTANT

## 2022-04-28 PROCEDURE — 88305 TISSUE EXAM BY PATHOLOGIST: CPT | Mod: 26 | Performed by: DERMATOLOGY

## 2022-04-28 PROCEDURE — 88305 TISSUE EXAM BY PATHOLOGIST: CPT | Mod: TC | Performed by: PHYSICIAN ASSISTANT

## 2022-04-28 PROCEDURE — 99203 OFFICE O/P NEW LOW 30 MIN: CPT | Mod: 25 | Performed by: PHYSICIAN ASSISTANT

## 2022-04-28 RX ORDER — TRANEXAMIC ACID 650 MG/1
TABLET ORAL
Status: ON HOLD | COMMUNITY
Start: 2022-04-08 | End: 2022-07-02

## 2022-04-28 ASSESSMENT — PAIN SCALES - GENERAL: PAINLEVEL: NO PAIN (0)

## 2022-04-28 NOTE — NURSING NOTE
Lidocaine-epinephrine 1-1:062285 % injection   0.1mL once for one use, starting 4/28/2022 ending 4/28/2022,  2mL disp, R-0, injection  Injected by Rocio Nava, CMA

## 2022-04-28 NOTE — PATIENT INSTRUCTIONS
Wound Care After a Biopsy    What is a skin biopsy?  A skin biopsy allows the doctor to examine a very small piece of tissue under the microscope to determine the diagnosis and the best treatment for the skin condition. A local anesthetic (numbing medicine)  is injected with a very small needle into the skin area to be tested. A small piece of skin is taken from the area. Sometimes a suture (stitch) is used.     What are the risks of a skin biopsy?  I will experience scar, bleeding, swelling, pain, crusting and redness. I may experience incomplete removal or recurrence. Risks of this procedure are excessive bleeding, bruising, infection, nerve damage, numbness, thick (hypertrophic or keloidal) scar and non-diagnostic biopsy.    How should I care for my wound for the first 24 hours?  Keep the wound dry and covered for 24 hours  If it bleeds, hold direct pressure on the area for 15 minutes. If bleeding does not stop then go to the emergency room  Avoid strenuous exercise the first 1-2 days or as your doctor instructs you    How should I care for the wound after 24 hours?  After 24 hours, remove the bandage  You may bathe or shower as normal  If you had a scalp biopsy, you can shampoo as usual and can use shower water to clean the biopsy site daily  Clean the wound twice a day with gentle soap and water  Do not scrub, be gentle  Apply white petroleum/Vaseline after cleaning the wound with a cotton swab or a clean finger, and keep the site covered with a Bandaid /bandage. Bandages are not necessary with a scalp biopsy  If you are unable to cover the site with a Bandaid /bandage, re-apply ointment 2-3 times a day to keep the site moist. Moisture will help with healing  Avoid strenuous activity for first 1-2 days  Avoid lakes, rivers, pools, and oceans until the stitches are removed or the site is healed    How do I clean my wound?  Wash hands thoroughly with soap or use hand  before all wound care  Clean the  wound with gentle soap and water  Apply white petroleum/Vaseline  to wound after it is clean  Replace the Bandaid /bandage to keep the wound covered for the first few days or as instructed by your doctor  If you had a scalp biopsy, warm shower water to the area on a daily basis should suffice    What should I use to clean my wound?   Cotton-tipped applicators (Qtips )  White petroleum jelly (Vaseline ). Use a clean new container and use Q-tips to apply.  Bandaids   as needed  Gentle soap     How should I care for my wound long term?  Do not get your wound dirty  Keep up with wound care for one week or until the area is healed.  A small scab will form and fall off by itself when the area is completely healed. The area will be red and will become pink in color as it heals. Sun protection is very important for how your scar will turn out. Sunscreen with an SPF 30 or greater is recommended once the area is healed.  You should have some soreness but it should be mild and slowly go away over several days. Talk to your doctor about using tylenol for pain,    When should I call my doctor?  If you have increased:   Pain or swelling  Pus or drainage (clear or slightly yellow drainage is ok)  Temperature over 100F  Spreading redness or warmth around wound    When will I hear about my results?  The biopsy results can take 2 weeks to come back.  Your results will automatically release to [employer] before your provider has even reviewed them.  The clinic will call you with the results, send you a Mobile[employer] message, or have you schedule a follow-up clinic or phone time to discuss the results.  Contact our clinics if you do not hear from us in 2 weeks.    Who should I call with questions?  [facility]: [phone]  [facility]: [phone]  For urgent needs outside of business hours call the [facility] at [phone] and ask for the dermatology resident on call      Patient Education     Checking for Skin Cancer  You can find cancer early by checking your skin each month. There are 3 kinds of skin cancer. They are melanoma, basal cell carcinoma, and squamous cell carcinoma. Doing monthly skin checks is the best way to find new marks or skin changes. Follow the instructions below for checking your skin.   The ABCDEs of checking moles for melanoma   Check your moles or growths for signs of melanoma using ABCDE:   Asymmetry: the sides of the mole or growth don t match  Border: the edges are ragged, notched, or blurred  Color: the color within the mole or growth varies  Diameter: the mole or growth is larger than 6 mm (size of a pencil eraser)  Evolving: the size, shape, or color of the mole or growth is changing (evolving is not shown in the images below)    Checking for other types of skin cancer  Basal cell carcinoma or squamous cell carcinoma have symptoms such as:     A spot or mole that looks different from all other marks on your skin  Changes in how an area feels, such as itching, tenderness, or pain  Changes in the skin's surface, such as oozing, bleeding, or scaliness  A sore that does not heal  New swelling or redness beyond the border of a mole    Who s at risk?  Anyone can get skin cancer. But you are at greater risk if you have:   Fair skin, light-colored hair, or light-colored eyes  Many moles or abnormal moles on your skin  A history of sunburns from sunlight or tanning beds  A family history of skin cancer  A history of exposure to radiation or chemicals  A weakened immune system  If you have had skin cancer in the past, you are at risk for recurring skin cancer.   How to check your skin  Do your monthly skin checkups in front of a full-length mirror. Check all parts of your body, including your:   Head (ears, face, neck, and scalp)  Torso (front, back, and sides)  Arms (tops, undersides, upper, and lower armpits)  Hands (palms, backs, and fingers, including  under the nails)  Buttocks and genitals  Legs (front, back, and sides)  Feet (tops, soles, toes, including under the nails, and between toes)  If you have a lot of moles, take digital photos of them each month. Make sure to take photos both up close and from a distance. These can help you see if any moles change over time.   Most skin changes are not cancer. But if you see any changes in your skin, call your doctor right away. Only he or she can diagnose a problem. If you have skin cancer, seeing your doctor can be the first step toward getting the treatment that could save your life.   [facility] last reviewed this educational content on 4/1/2019 2000-2020 [employer]. [address] [place] [address], [place] [address]. All rights reserved. This information is not intended as a substitute for professional medical care. Always follow your healthcare professional's instructions.       When should I call my doctor?  If you are worsening or not improving, please, contact us or seek urgent care as noted below.     Who should I call with questions (adults)?  [facility] (adult and pediatric): [phone]  [facility] (adult): [phone]  For urgent needs outside of business hours call the [facility] at [phone] and ask for the dermatology resident on call to be paged  If this is a medical emergency and you are unable to reach an ER, Call [phone]    Who should I call with questions (pediatric)?  [facility]- Pediatric Dermatology  Dr. Pooja Encinas, Dr. Tony Holland, Dr. Darby Cardona, PIETER Jay, Dr. Ashely Maddox, Dr. Pham Soriano & Dr. Pa Arriaga  Non-urgent nurse triage line; [phone]- Guadalupe and Kristen CANELA Care Coordinatornhan Fernandez (/Complex ) [phone]    If you need a prescription refill, please contact your pharmacy. Refills are approved or denied by our  Physicians during normal business hours, Monday through Fridays  Per office policy, refills will not be granted if you have not been seen within the past year (or sooner depending on your child's condition)    Scheduling Information:  Pediatric Appointment Scheduling and Call Center [phone]  Radiology Scheduling- [phone]  Sedation Unit Scheduling- [phone]  [place] Scheduling- General [phone]; Pediatric Dermatology [phone]  Main  Services: [phone]  Serbian: [phone]  French: [phone]  Hmong/[place] citizen/Turkmen: [phone]  Preadmission Nursing Department Fax Number: [phone] (Fax all pre-operative paperwork to this number)    For urgent matters arising during evenings, weekends, or holidays that cannot wait for normal business hours please call [phone] and ask for the dermatology resident on call to be paged.

## 2022-04-28 NOTE — NURSING NOTE
Dermatology Rooming Note    Arabella Arrington's goals for this visit include:   Chief Complaint   Patient presents with     Skin Check     A few moles of concern     Rocio Nava, CMA

## 2022-04-28 NOTE — LETTER
4/28/2022       RE: Arabella Arrington  [address] [place] [address]     Dear Colleague,    Thank you for referring your patient, Arabella Arrington, to the [facility] [place] at [facility]. Please see a copy of my visit note below.    [facility] Dermatology Note  Encounter Date: Apr 28, 2022  Office Visit     Dermatology Problem List:  # NUB, right nasal tip, s/p shave bx 4/28/2022   ____________________________________________    Assessment & Plan:    # NUB, right nasal tip, biopsy ddx benign irritated nevus vs other  - Shave biopsy today, see procedure below    # Benign lesions: Multiple benign nevi, seborrheic keratoses, cherry angiomas, dermatofibromas. Explained to patient benign nature of lesions. No treatment is necessary at this time unless the lesion changes or becomes symptomatic.   - ABCDs of melanoma were discussed and self skin checks were advised.  - Sun precaution was advised including the use of sun screens of SPF 30 or higher, sun protective clothing, and avoidance of tanning beds.     Procedures Performed:   - Shave biopsy procedure note, location(s): see above. After discussion of benefits and risks including but not limited to bleeding, infection, scar, incomplete removal, recurrence, and non-diagnostic biopsy, written consent and photographs were obtained. The area was cleaned with isopropyl alcohol. 0.5mL of 1% lidocaine with epinephrine was injected to obtain adequate anesthesia of lesion(s). Shave biopsy at site(s) performed. Hemostasis was achieved with aluminium chloride. Petrolatum ointment and a sterile dressing were applied. The patient tolerated the procedure and no complications were noted. The patient was provided with verbal and written post care instructions.     Follow-up: 3 year(s) in-person, or earlier for new or changing lesions    Staff and Scribe:     Scribe Disclosure:  I  Bernard Acuna, am serving as a scribe to document services personally performed by Paulette Zepeda PA-C based on data collection and the provider's statements to me.     Provider Disclosure:   The documentation recorded by the scribe accurately reflects the services I personally performed and the decisions made by me.    All risks, benefits and alternatives were discussed with patient.  Patient is in agreement and understands the assessment and plan.  All questions were answered.    Paulette Zepeda PA-C, [facility]  [facility] [place]: Phone: [phone], Fax: [phone]  [facility]: Phone: [phone],  Fax: [phone]  [facility]: Phone: [phone], Fax: [phone]  ____________________________________________    CC: Skin Check (A few moles of concern)    HPI:  Ms. Arabella Arrington is a(n) 41 year old female who presents today as a new patient for FBSE. Patient reports bothersome spots on her nose and leg that she would like examined. Denies family history of melanoma. Reports tanning bed use 1 time during high school. No personal hx skin cancer.     Patient is otherwise feeling well, without additional skin concerns.    Labs Reviewed:  N/A    Physical Exam:  Vitals: LMP 03/12/2022 (Exact Date)   SKIN: Total skin excluding the undergarment areas was performed. The exam included the head/face, neck, both arms, chest, back, abdomen, both legs, digits and/or nails.   - Rogers skin type 2  - On the right nasal tip, 4 mm pink and brown soft papule.  - There are dome shaped bright red papules on the trunk and extremities.   - Multiple regular brown pigmented macules and papules are identified on the trunk and extremities.   - There are waxy stuck on tan to brown papules on the trunk and extremities.   - There are firm tan/flesh colored papule that dimple with lateral pressure on the right calf.   - No  other lesions of concern on areas examined.     Medications:  Current Outpatient Medications   Medication     ferrous sulfate (FEROSUL) 325 (65 Fe) MG tablet     fish oil-omega-3 fatty acids 1000 MG capsule     Psyllium Husk POWD     tranexamic acid (LYSTEDA) 650 MG tablet     No current facility-administered medications for this visit.      Past Medical History:   Patient Active Problem List   Diagnosis     Intramural leiomyoma of uterus     BMI 32.0-32.9,adult     Family history of esophageal cancer     Family history of colonic polyps     Menorrhagia with regular cycle     Family history of factor V Leiden mutation     Iron deficiency anemia due to chronic blood loss     Prediabetes     Past Medical History:   Diagnosis Date     Cyst of left ovary 1/12/2016     none         CC Kerrie Proctor MD  [address] [place] [address] on close of this encounter.

## 2022-05-02 LAB
PATH REPORT.COMMENTS IMP SPEC: NORMAL
PATH REPORT.COMMENTS IMP SPEC: NORMAL
PATH REPORT.FINAL DX SPEC: NORMAL
PATH REPORT.GROSS SPEC: NORMAL
PATH REPORT.MICROSCOPIC SPEC OTHER STN: NORMAL
PATH REPORT.RELEVANT HX SPEC: NORMAL

## 2022-06-03 ENCOUNTER — OFFICE VISIT (OUTPATIENT)
Dept: FAMILY MEDICINE | Facility: CLINIC | Age: 42
End: 2022-06-03
Payer: COMMERCIAL

## 2022-06-03 VITALS
OXYGEN SATURATION: 98 % | RESPIRATION RATE: 20 BRPM | TEMPERATURE: 97.9 F | BODY MASS INDEX: 32.4 KG/M2 | HEART RATE: 72 BPM | SYSTOLIC BLOOD PRESSURE: 116 MMHG | HEIGHT: 64 IN | DIASTOLIC BLOOD PRESSURE: 79 MMHG | WEIGHT: 189.8 LBS

## 2022-06-03 DIAGNOSIS — Z83.719 FAMILY HISTORY OF COLONIC POLYPS: ICD-10-CM

## 2022-06-03 DIAGNOSIS — N92.0 MENORRHAGIA WITH REGULAR CYCLE: ICD-10-CM

## 2022-06-03 DIAGNOSIS — Z80.0 FAMILY HISTORY OF ESOPHAGEAL CANCER: ICD-10-CM

## 2022-06-03 DIAGNOSIS — D50.0 IRON DEFICIENCY ANEMIA DUE TO CHRONIC BLOOD LOSS: ICD-10-CM

## 2022-06-03 DIAGNOSIS — Z01.818 PRE-OPERATIVE GENERAL PHYSICAL EXAMINATION: Primary | ICD-10-CM

## 2022-06-03 DIAGNOSIS — Z83.2 FAMILY HISTORY OF FACTOR V LEIDEN MUTATION: ICD-10-CM

## 2022-06-03 DIAGNOSIS — D25.1 INTRAMURAL LEIOMYOMA OF UTERUS: ICD-10-CM

## 2022-06-03 DIAGNOSIS — R73.03 PREDIABETES: ICD-10-CM

## 2022-06-03 LAB
BASOPHILS # BLD AUTO: 0 10E3/UL (ref 0–0.2)
BASOPHILS NFR BLD AUTO: 0 %
EOSINOPHIL # BLD AUTO: 0.3 10E3/UL (ref 0–0.7)
EOSINOPHIL NFR BLD AUTO: 4 %
ERYTHROCYTE [DISTWIDTH] IN BLOOD BY AUTOMATED COUNT: 15.1 % (ref 10–15)
HBA1C MFR BLD: 5.5 % (ref 0–5.6)
HCT VFR BLD AUTO: 40.8 % (ref 35–47)
HGB BLD-MCNC: 13.3 G/DL (ref 11.7–15.7)
IMM GRANULOCYTES # BLD: 0 10E3/UL
IMM GRANULOCYTES NFR BLD: 0 %
LYMPHOCYTES # BLD AUTO: 2.3 10E3/UL (ref 0.8–5.3)
LYMPHOCYTES NFR BLD AUTO: 29 %
MCH RBC QN AUTO: 27.7 PG (ref 26.5–33)
MCHC RBC AUTO-ENTMCNC: 32.6 G/DL (ref 31.5–36.5)
MCV RBC AUTO: 85 FL (ref 78–100)
MONOCYTES # BLD AUTO: 0.7 10E3/UL (ref 0–1.3)
MONOCYTES NFR BLD AUTO: 9 %
NEUTROPHILS # BLD AUTO: 4.7 10E3/UL (ref 1.6–8.3)
NEUTROPHILS NFR BLD AUTO: 58 %
PLATELET # BLD AUTO: 269 10E3/UL (ref 150–450)
RBC # BLD AUTO: 4.8 10E6/UL (ref 3.8–5.2)
WBC # BLD AUTO: 8 10E3/UL (ref 4–11)

## 2022-06-03 PROCEDURE — 99214 OFFICE O/P EST MOD 30 MIN: CPT | Performed by: FAMILY MEDICINE

## 2022-06-03 PROCEDURE — 85025 COMPLETE CBC W/AUTO DIFF WBC: CPT | Performed by: FAMILY MEDICINE

## 2022-06-03 PROCEDURE — 36415 COLL VENOUS BLD VENIPUNCTURE: CPT | Performed by: FAMILY MEDICINE

## 2022-06-03 PROCEDURE — 80053 COMPREHEN METABOLIC PANEL: CPT | Performed by: FAMILY MEDICINE

## 2022-06-03 PROCEDURE — 83036 HEMOGLOBIN GLYCOSYLATED A1C: CPT | Performed by: FAMILY MEDICINE

## 2022-06-03 NOTE — PROGRESS NOTES
[facility] [address] [place] [address]  Phone: [phone]  Primary Provider: Kerrie Proctor  Pre-op Performing Provider: KERRIE PROCTOR    PREOPERATIVE EVALUATION:  Today's date: 6/3/2022    Arabella Arrington is a 41 year old female who presents for a preoperative evaluation.    Surgical Information:  Surgery/Procedure: total abdominal hysterectomy, B/l salpingectomy and cystoscopy ( to leave ovaries in place)   Surgery Location: [facility]  Surgeon: Dr.Kourtney Multani  Surgery Date: 7/1/22  Time of Surgery: 7:30 am  Where patient plans to recover: At home with family  Fax number for surgical facility: Note does not need to be faxed, will be available electronically in Epic.    Type of Anesthesia Anticipated: General    Assessment & Plan     The proposed surgical procedure is considered INTERMEDIATE risk.    Pre-operative general physical examination  Intramural leiomyoma of uterus- CBC with platelets and differential; Future  Menorrhagia with regular cycle- CBC with platelets and differential; Future  Managed with tranexamic acid   Iron deficiency anemia due to chronic blood loss  Improved with a daily iron and tranexamic use - CBC with platelets and differential; Future    Here for pre op for above procedure, MATTHEW plus bilateral salpingectomy and cystoscopy leaving ovaries in place unless indicated otherwise intraoperatively.  For history of menorrhagia resulting in iron deficiency anemia.  Menorrhagia currently controlled well tranexamic acid 3 times a day first 5 days of cycle.  And use of an iron pill daily.  Most recent labs in April showed hemoglobin no longer anemic and iron and ferritin levels back to normal.  Has been doing well.   Will do Labs and diagnostics today   If stable will clear for surgery otherwise may need additional work up   COVID testing per the surgeon's office.  Has already a date scheduled  Take no meds am of surgery  Hold aspirin,  antiinflammatories like motrin aleve etc, fish oil and glucosamine preferable 7 days prior to surgery  Will fax /send note to surgeon once completed    Consider  referral for family history of esophageal cancer and colon cancer.  Recommend recheck colonoscopy in 5 years rather than in 10 years so like in 2025.  Follow-up with me in 3 months postsurgery so we can recheck iron levels and see how you do off the iron postsurgery- CBC with platelets and differential; Future  - Comprehensive metabolic panel (BMP + Alb, Alk Phos, ALT, AST, Total. Bili, TP); Future  - Hemoglobin A1c; Future  - CBC with platelets and differential  - Comprehensive metabolic panel (BMP + Alb, Alk Phos, ALT, AST, Total. Bili, TP)  - Hemoglobin A1c    BMI 32.0-32.9,adult  Will check for diabetes as BMI and diabetes may increase perioperative cardiac risk.  - Hemoglobin A1c; Future  - Hemoglobin A1c    Prediabetes  Will check hemoglobin A1c today, working on diet exercise and weight loss  - Hemoglobin A1c; Future  - Hemoglobin A1c    Family history of factor V Leiden mutation  Personally checked negative in the recent past    Family history of esophageal cancer  Family history of colonic polyps  Consider  referral for family history of esophageal cancer and colon cancer.  Recommend recheck colonoscopy in 5 years rather than in 10 years so like in 2025.     Risks and Recommendations:  The patient has the following additional risks and recommendations for perioperative complications:   - Consult Hospitalist / IM to assist with post-op medical management    RECOMMENDATION:  APPROVAL GIVEN to proceed with proposed procedure, without further diagnostic evaluation if labs wnl     Review of the result(s) of each unique test - since dec 2021    25 minutes spent on the date of the encounter doing chart review, history and exam, documentation and further activities per the note    Subjective     HPI related to upcoming procedure: here  for pre op for above procedure, MATTHEW plus bilateral salpingectomy and cystoscopy leaving ovaries in place unless indicated otherwise intraoperatively.  For history of menorrhagia resulting in iron deficiency anemia.  Menorrhagia currently controlled well tranexamic acid 3 times a day first 5 days ago.  And use of an iron pill daily.    Preop Questions 6/2/2022   1. Have you ever had a heart attack or stroke? No   2. Have you ever had surgery on your heart or blood vessels, such as a stent placement, a coronary artery bypass, or surgery on an artery in your head, neck, heart, or legs? No   3. Do you have chest pain with activity? No   4. Do you have a history of  heart failure? No   5. Do you currently have a cold, bronchitis or symptoms of other infection? No   6. Do you have a cough, shortness of breath, or wheezing? No   7. Do you or anyone in your family have previous history of blood clots? No   8. Do you or does anyone in your family have a serious bleeding problem such as prolonged bleeding following surgeries or cuts? No   9. Have you ever had problems with anemia or been told to take iron pills? YES - on iron 65 mg a day   10. Have you had any abnormal blood loss such as black, tarry or bloody stools, or abnormal vaginal bleeding? No   11. Have you ever had a blood transfusion? No   12. Are you willing to have a blood transfusion if it is medically needed before, during, or after your surgery? Yes   13. Have you or any of your relatives ever had problems with anesthesia? UNKNOWN - dad had trouble with anesthesia ( but was a lifetime alcoholic) , sister did fine with anesthesia    14. Do you have sleep apnea, excessive snoring or daytime drowsiness? No   15. Do you have any artifical heart valves or other implanted medical devices like a pacemaker, defibrillator, or continuous glucose monitor? No   16. Do you have artificial joints? No   17. Are you allergic to latex? No   18. Is there any chance that you may  be pregnant? No     BACKGROUND   41-year-old lady, , former smoker, BMI more than 32, history of prediabetes with hemoglobin A1c of 5.7,  with history of  intramural leiomyomas and left ovarian cyst, & AUB,  prior menorrhagia with regular cycles, family history of factor V Leiden mutation but personally tested negative, family history of dad with a colectomy history due to polyps and/serrated adenoma, colonoscopy done in  in self was normal, family history of esophageal cancer currently opting to defer  consult, history of iron deficiency anemia due to chronic blood loss, ,  previously under care of Barbara Ha, seen by GYN  in 2018 for fibroids following episode of acute pain that made her seek ER care & ct scan ordered. Was told fibroids were unlikely cause of the acute severe pain at that time. Options discussed and was to think about it and get back to gynecology at that time.  [place]  negative.  Seen first time by this provider on 22 for preventive health and additional concerns. Discussed self breast check regularly. To consider referral to  given fh of esophageal cancer , colon polyps, factor 5 if insurance would cover. Mammogram due and referral placed. Was to return for a Pelvic / PAP when not on her period. Healthcare maintenance reviewed. To consider working on healthcare directives, & honoring choices given. Noted COVID-vaccine including booster and flu shot up-to-date. Labs done.   BMI 32:  Encouraged a healthy diet, frequent small meals, less carbohydrates more Mediterranean-style.  To try to lose at least 10% of total body weight over time to be healthier.  After the visit hemoglobin A1c came back elevated at 5.7 suggesting prediabetes, recommended rechecking in 6 months to 1 year  Due to prior left ovarian cyst, fibroids of uterus and heavy periods, a repeat pelvic ultrasound was ordered. After the visit hemoglobin came back showing anemia with hemoglobin 9.8  and MCH MCV suggestive of iron deficiency, iron and ferritin came back low with ferritin of 9 & iron of 16, suspected from heavy periods.  Was asymptomatic.  Recommended to increase iron in diet, consider taking over-the-counter iron daily and recheck in 3 months with CBC, iron, ferritin, B12, reticulocyte & peripheral smear.  For mole on nose and on right leg and multiple pigmented nevi on skin of face, neck, back, chest, abdomen & extremities,  referred to dermatology for skin check and mole removal. Discussed family history of esophageal cancer and colonic polyps.  Advised to Increase fiber in diet and referred to GI for screening colonoscopy.  Was to consider a  consultation. Family history of factor V Leiden mutation  & testing done after genetic form signed as could help make recommendations in the future with regard to heavy periods and hormonal treatment.  If positive for clotting disorder would avoid estrogen containing compounds.  Labs came back showing a mildly elevated LDL with ASCVD risk of 2%, CMP normal TSH normal ferritin 9 iron 16, hemoglobin 9.8, AST minimally elevated, glucose minimally elevated, hemoglobin A1c 5.7. Mammogram done 1/28 was normal. Pelvic ultrasound on 2/8/2022 showed fibroid filled uterus ovaries not visible and referred to gynecology. Seen by gynecology virtually on 2/11/2022 and advised an MRI scheduled for 12 March and an EMB scheduled for the 22nd and was considering a hysterectomy given had no plans to start a family.   Colonoscopy done 2/18/2022 was normal recommended recheck in 10 years and advised to find out more information on dad's reason for colectomy given history of polyps to see if genetic testing could be done and also to check for celiac given iron deficiency anemia even if probably due to heavy menses.      Seen 3/7/22 for a pap.  Was to continue iron supplement daily and recheck iron labs & for celiac in 3 months.  Continued on psyllium to prevent  constipation from iron. Iron deficiency anemia suspected due to chronic loss due to heavy periods, suspected due to large fibroids. History of ovarian cyst but unable to see ovaries on most recent ultrasound due to fibroid distortion. Family history of factor V Leiden with a recent factor V testing was negative. Discussed Family history of dad having had a colectomy for many serrated adenoma polyps, sister also with history of colonic polyps.  Recent colonoscopy on 2/18/2022 was normal and recommended recheck in 10 years.  GI did recommend checking for celiac and this was to be added to future labs. Given family history though would recommend  consult to assess risk of a genetic polyposis condition.  If unable to do genetic testing to consider colonoscopy every 5 years instead to be safe. Discussed genetic testing may be helpful also given family history of esophageal cancer. Was to continue omega for mildly elevated LDL.  Overall cardiovascular risk was low. Given history of prediabetes and BMI, to continue with fiber intake and eating healthy and losing weight to lower cardiovascular & cancer risk. Discussed could refer to hematology if hemoglobin did not go adequately up post surgery.   Seen by gyn 3/22 and options discussed and planning n MATTHEW, B/l salpingectomy and cystoscopy but to leave ovaries in place unless something found intraoperatively. Planned for iv iron at time of surgery.   On 4/15/21 cbc noted improved to hb of 13.4, celiac testing was negative. Peripheral smear was normal, iron improved form 16 to 36, ferritin improved to 38 & vit B 12 was 562 , wnl.   Seen by derm 4/28/22 and had a shave biopsy of nose lesion and noted other benign lesions.     Health Care Directive:  Patient does not have a Health Care Directive or Living Will: Discussed advance care planning with patient; information given to patient to review.   FULL CODE     Preoperative Review of :   reviewed - no record of  "controlled substances prescribed.    Status of Chronic Conditions:  ANEMIA - Patient has a recent history of moderate-severe anemia, which has not been symptomatic. Work up to date has revealed taking oral iron. Treatment has been good    Review of Systems  CONSTITUTIONAL: NEGATIVE for fever, chills, change in weight  INTEGUMENTARY/SKIN: NEGATIVE for worrisome rashes, moles or lesions  EYES: NEGATIVE for vision changes or irritation  ENT/MOUTH: NEGATIVE for ear, mouth and throat problems  RESP: NEGATIVE for significant cough or SOB  CV: NEGATIVE for chest pain, palpitations or peripheral edema  GI: NEGATIVE for nausea, abdominal pain, heartburn, or change in bowel habits  : NEGATIVE for frequency, dysuria, or hematuria  MUSCULOSKELETAL: NEGATIVE for significant arthralgias or myalgia  NEURO: NEGATIVE for weakness, dizziness or paresthesias  ENDOCRINE: NEGATIVE for temperature intolerance, skin/hair changes  HEME: NEGATIVE for bleeding problems  PSYCHIATRIC: NEGATIVE for changes in mood or affect    Patient Active Problem List    Diagnosis Date Noted     Iron deficiency anemia due to chronic blood loss 03/07/2022     Priority: Medium     Prediabetes 03/07/2022     Priority: Medium     BMI 32.0-32.9,adult 01/17/2022     Priority: Medium     Family history of esophageal cancer 01/17/2022     Priority: Medium     Family history of colonic polyps 01/17/2022     Priority: Medium     Father had a colectomy for \" polyps? Serrated adenoma\"        Menorrhagia with regular cycle 01/17/2022     Priority: Medium     Family history of factor V Leiden mutation 01/17/2022     Priority: Medium     Personal tested negative       Intramural leiomyoma of uterus 01/12/2016     Priority: Medium      Past Medical History:   Diagnosis Date     Cyst of left ovary 01/12/2016     none      Past Surgical History:   Procedure Laterality Date     COLONOSCOPY  02/18/2022     no surgical history       Current Outpatient Medications   Medication " "Sig Dispense Refill     ferrous sulfate (FEROSUL) 325 (65 Fe) MG tablet        fish oil-omega-3 fatty acids 1000 MG capsule        tranexamic acid (LYSTEDA) 650 MG tablet          No Known Allergies     Social History     Tobacco Use     Smoking status: Former Smoker     Packs/day: 0.50     Years: 11.00     Pack years: 5.50     Types: Cigarettes, Cigarettes     Start date: 1999     Quit date: 11/15/2009     Years since quittin.5     Smokeless tobacco: Never Used   Substance Use Topics     Alcohol use: Yes     Comment: 0-1 drink weekly     Family History   Problem Relation Age of Onset     Esophageal Cancer Mother 55        esophageal cancer     Blood Disease Mother         factor 5     Diabetes Father         type 2     Respiratory Father         emphasema     Colon Polyps Father         had colon removed     Diabetes Paternal Grandmother      History   Drug Use No         Objective     /79 (BP Location: Right arm, Patient Position: Sitting, Cuff Size: Adult Regular)   Pulse 72   Temp 97.9  F (36.6  C) (Temporal)   Resp 20   Ht 1.613 m (5' 3.5\")   Wt 86.1 kg (189 lb 12.8 oz)   SpO2 98%   BMI 33.09 kg/m      Physical Exam    GENERAL APPEARANCE: healthy, alert, active, no distress, cooperative and obese     EYES: EOMI, PERRL     HENT: ear canals and TM's normal and nose and mouth without ulcers or lesions     NECK: no adenopathy, no asymmetry, masses, or scars and thyroid normal to palpation     RESP: lungs clear to auscultation - no rales, rhonchi or wheezes     CV: regular rates and rhythm, normal S1 S2, no S3 or S4 and no murmur, click or rub     ABDOMEN:  soft, non tender, no HSM or masses and bowel sounds normal     MS: extremities normal- no gross deformities noted, no evidence of inflammation in joints, FROM in all extremities.     SKIN: no suspicious lesions or rashes     NEURO: Normal strength and tone, sensory exam grossly normal, mentation intact and speech normal     PSYCH: mentation " appears normal. and affect normal/bright     LYMPHATICS: No cervical adenopathy    Recent Labs   Lab Test 04/15/22  1011 01/17/22  1111   HGB 13.4 9.8*    302   NA  --  138   POTASSIUM  --  3.9   CR  --  0.77   A1C  --  5.7*        Diagnostics:  No results found for this or any previous visit (from the past 24 hour(s)).   No EKG required, no history of coronary heart disease, significant arrhythmia, peripheral arterial disease or other structural heart disease.    Revised Cardiac Risk Index (RCRI):  The patient has the following serious cardiovascular risks for perioperative complications:   - No serious cardiac risks = 0 points     RCRI Interpretation: 0 points: Class I (very low risk - 0.4% complication rate)           Signed Electronically by: Kerrie Proctor MD  Copy of this evaluation report is provided to requesting physician.

## 2022-06-03 NOTE — H&P (VIEW-ONLY)
[facility] [address] [place] [address]  Phone: [phone]  Primary Provider: Kerrie Proctor  Pre-op Performing Provider: KERRIE PROCTOR    PREOPERATIVE EVALUATION:  Today's date: 6/3/2022    Arabella Arrington is a 41 year old female who presents for a preoperative evaluation.    Surgical Information:  Surgery/Procedure: total abdominal hysterectomy, B/l salpingectomy and cystoscopy ( to leave ovaries in place)   Surgery Location: [facility]  Surgeon: Dr.Kourtney Multani  Surgery Date: 7/1/22  Time of Surgery: 7:30 am  Where patient plans to recover: At home with family  Fax number for surgical facility: Note does not need to be faxed, will be available electronically in Epic.    Type of Anesthesia Anticipated: General    Assessment & Plan     The proposed surgical procedure is considered INTERMEDIATE risk.    Pre-operative general physical examination  Intramural leiomyoma of uterus- CBC with platelets and differential; Future  Menorrhagia with regular cycle- CBC with platelets and differential; Future  Managed with tranexamic acid   Iron deficiency anemia due to chronic blood loss  Improved with a daily iron and tranexamic use - CBC with platelets and differential; Future    Here for pre op for above procedure, MATTHEW plus bilateral salpingectomy and cystoscopy leaving ovaries in place unless indicated otherwise intraoperatively.  For history of menorrhagia resulting in iron deficiency anemia.  Menorrhagia currently controlled well tranexamic acid 3 times a day first 5 days of cycle.  And use of an iron pill daily.  Most recent labs in April showed hemoglobin no longer anemic and iron and ferritin levels back to normal.  Has been doing well.   Will do Labs and diagnostics today   If stable will clear for surgery otherwise may need additional work up   COVID testing per the surgeon's office.  Has already a date scheduled  Take no meds am of surgery  Hold aspirin,  antiinflammatories like motrin aleve etc, fish oil and glucosamine preferable 7 days prior to surgery  Will fax /send note to surgeon once completed    Consider  referral for family history of esophageal cancer and colon cancer.  Recommend recheck colonoscopy in 5 years rather than in 10 years so like in 2025.  Follow-up with me in 3 months postsurgery so we can recheck iron levels and see how you do off the iron postsurgery- CBC with platelets and differential; Future  - Comprehensive metabolic panel (BMP + Alb, Alk Phos, ALT, AST, Total. Bili, TP); Future  - Hemoglobin A1c; Future  - CBC with platelets and differential  - Comprehensive metabolic panel (BMP + Alb, Alk Phos, ALT, AST, Total. Bili, TP)  - Hemoglobin A1c    BMI 32.0-32.9,adult  Will check for diabetes as BMI and diabetes may increase perioperative cardiac risk.  - Hemoglobin A1c; Future  - Hemoglobin A1c    Prediabetes  Will check hemoglobin A1c today, working on diet exercise and weight loss  - Hemoglobin A1c; Future  - Hemoglobin A1c    Family history of factor V Leiden mutation  Personally checked negative in the recent past    Family history of esophageal cancer  Family history of colonic polyps  Consider  referral for family history of esophageal cancer and colon cancer.  Recommend recheck colonoscopy in 5 years rather than in 10 years so like in 2025.     Risks and Recommendations:  The patient has the following additional risks and recommendations for perioperative complications:   - Consult Hospitalist / IM to assist with post-op medical management    RECOMMENDATION:  APPROVAL GIVEN to proceed with proposed procedure, without further diagnostic evaluation if labs wnl     Review of the result(s) of each unique test - since dec 2021    25 minutes spent on the date of the encounter doing chart review, history and exam, documentation and further activities per the note    Subjective     HPI related to upcoming procedure: here  for pre op for above procedure, MATTHEW plus bilateral salpingectomy and cystoscopy leaving ovaries in place unless indicated otherwise intraoperatively.  For history of menorrhagia resulting in iron deficiency anemia.  Menorrhagia currently controlled well tranexamic acid 3 times a day first 5 days ago.  And use of an iron pill daily.    Preop Questions 6/2/2022   1. Have you ever had a heart attack or stroke? No   2. Have you ever had surgery on your heart or blood vessels, such as a stent placement, a coronary artery bypass, or surgery on an artery in your head, neck, heart, or legs? No   3. Do you have chest pain with activity? No   4. Do you have a history of  heart failure? No   5. Do you currently have a cold, bronchitis or symptoms of other infection? No   6. Do you have a cough, shortness of breath, or wheezing? No   7. Do you or anyone in your family have previous history of blood clots? No   8. Do you or does anyone in your family have a serious bleeding problem such as prolonged bleeding following surgeries or cuts? No   9. Have you ever had problems with anemia or been told to take iron pills? YES - on iron 65 mg a day   10. Have you had any abnormal blood loss such as black, tarry or bloody stools, or abnormal vaginal bleeding? No   11. Have you ever had a blood transfusion? No   12. Are you willing to have a blood transfusion if it is medically needed before, during, or after your surgery? Yes   13. Have you or any of your relatives ever had problems with anesthesia? UNKNOWN - dad had trouble with anesthesia ( but was a lifetime alcoholic) , sister did fine with anesthesia    14. Do you have sleep apnea, excessive snoring or daytime drowsiness? No   15. Do you have any artifical heart valves or other implanted medical devices like a pacemaker, defibrillator, or continuous glucose monitor? No   16. Do you have artificial joints? No   17. Are you allergic to latex? No   18. Is there any chance that you may  be pregnant? No     BACKGROUND   41-year-old lady, , former smoker, BMI more than 32, history of prediabetes with hemoglobin A1c of 5.7,  with history of  intramural leiomyomas and left ovarian cyst, & AUB,  prior menorrhagia with regular cycles, family history of factor V Leiden mutation but personally tested negative, family history of dad with a colectomy history due to polyps and/serrated adenoma, colonoscopy done in  in self was normal, family history of esophageal cancer currently opting to defer  consult, history of iron deficiency anemia due to chronic blood loss, ,  previously under care of Barbara Ha, seen by GYN  in 2018 for fibroids following episode of acute pain that made her seek ER care & ct scan ordered. Was told fibroids were unlikely cause of the acute severe pain at that time. Options discussed and was to think about it and get back to gynecology at that time.  [place]  negative.  Seen first time by this provider on 22 for preventive health and additional concerns. Discussed self breast check regularly. To consider referral to  given fh of esophageal cancer , colon polyps, factor 5 if insurance would cover. Mammogram due and referral placed. Was to return for a Pelvic / PAP when not on her period. Healthcare maintenance reviewed. To consider working on healthcare directives, & honoring choices given. Noted COVID-vaccine including booster and flu shot up-to-date. Labs done.   BMI 32:  Encouraged a healthy diet, frequent small meals, less carbohydrates more Mediterranean-style.  To try to lose at least 10% of total body weight over time to be healthier.  After the visit hemoglobin A1c came back elevated at 5.7 suggesting prediabetes, recommended rechecking in 6 months to 1 year  Due to prior left ovarian cyst, fibroids of uterus and heavy periods, a repeat pelvic ultrasound was ordered. After the visit hemoglobin came back showing anemia with hemoglobin 9.8  and MCH MCV suggestive of iron deficiency, iron and ferritin came back low with ferritin of 9 & iron of 16, suspected from heavy periods.  Was asymptomatic.  Recommended to increase iron in diet, consider taking over-the-counter iron daily and recheck in 3 months with CBC, iron, ferritin, B12, reticulocyte & peripheral smear.  For mole on nose and on right leg and multiple pigmented nevi on skin of face, neck, back, chest, abdomen & extremities,  referred to dermatology for skin check and mole removal. Discussed family history of esophageal cancer and colonic polyps.  Advised to Increase fiber in diet and referred to GI for screening colonoscopy.  Was to consider a  consultation. Family history of factor V Leiden mutation  & testing done after genetic form signed as could help make recommendations in the future with regard to heavy periods and hormonal treatment.  If positive for clotting disorder would avoid estrogen containing compounds.  Labs came back showing a mildly elevated LDL with ASCVD risk of 2%, CMP normal TSH normal ferritin 9 iron 16, hemoglobin 9.8, AST minimally elevated, glucose minimally elevated, hemoglobin A1c 5.7. Mammogram done 1/28 was normal. Pelvic ultrasound on 2/8/2022 showed fibroid filled uterus ovaries not visible and referred to gynecology. Seen by gynecology virtually on 2/11/2022 and advised an MRI scheduled for 12 March and an EMB scheduled for the 22nd and was considering a hysterectomy given had no plans to start a family.   Colonoscopy done 2/18/2022 was normal recommended recheck in 10 years and advised to find out more information on dad's reason for colectomy given history of polyps to see if genetic testing could be done and also to check for celiac given iron deficiency anemia even if probably due to heavy menses.      Seen 3/7/22 for a pap.  Was to continue iron supplement daily and recheck iron labs & for celiac in 3 months.  Continued on psyllium to prevent  constipation from iron. Iron deficiency anemia suspected due to chronic loss due to heavy periods, suspected due to large fibroids. History of ovarian cyst but unable to see ovaries on most recent ultrasound due to fibroid distortion. Family history of factor V Leiden with a recent factor V testing was negative. Discussed Family history of dad having had a colectomy for many serrated adenoma polyps, sister also with history of colonic polyps.  Recent colonoscopy on 2/18/2022 was normal and recommended recheck in 10 years.  GI did recommend checking for celiac and this was to be added to future labs. Given family history though would recommend  consult to assess risk of a genetic polyposis condition.  If unable to do genetic testing to consider colonoscopy every 5 years instead to be safe. Discussed genetic testing may be helpful also given family history of esophageal cancer. Was to continue omega for mildly elevated LDL.  Overall cardiovascular risk was low. Given history of prediabetes and BMI, to continue with fiber intake and eating healthy and losing weight to lower cardiovascular & cancer risk. Discussed could refer to hematology if hemoglobin did not go adequately up post surgery.   Seen by gyn 3/22 and options discussed and planning n MATTHEW, B/l salpingectomy and cystoscopy but to leave ovaries in place unless something found intraoperatively. Planned for iv iron at time of surgery.   On 4/15/21 cbc noted improved to hb of 13.4, celiac testing was negative. Peripheral smear was normal, iron improved form 16 to 36, ferritin improved to 38 & vit B 12 was 562 , wnl.   Seen by derm 4/28/22 and had a shave biopsy of nose lesion and noted other benign lesions.     Health Care Directive:  Patient does not have a Health Care Directive or Living Will: Discussed advance care planning with patient; information given to patient to review.   FULL CODE     Preoperative Review of :   reviewed - no record of  "controlled substances prescribed.    Status of Chronic Conditions:  ANEMIA - Patient has a recent history of moderate-severe anemia, which has not been symptomatic. Work up to date has revealed taking oral iron. Treatment has been good    Review of Systems  CONSTITUTIONAL: NEGATIVE for fever, chills, change in weight  INTEGUMENTARY/SKIN: NEGATIVE for worrisome rashes, moles or lesions  EYES: NEGATIVE for vision changes or irritation  ENT/MOUTH: NEGATIVE for ear, mouth and throat problems  RESP: NEGATIVE for significant cough or SOB  CV: NEGATIVE for chest pain, palpitations or peripheral edema  GI: NEGATIVE for nausea, abdominal pain, heartburn, or change in bowel habits  : NEGATIVE for frequency, dysuria, or hematuria  MUSCULOSKELETAL: NEGATIVE for significant arthralgias or myalgia  NEURO: NEGATIVE for weakness, dizziness or paresthesias  ENDOCRINE: NEGATIVE for temperature intolerance, skin/hair changes  HEME: NEGATIVE for bleeding problems  PSYCHIATRIC: NEGATIVE for changes in mood or affect    Patient Active Problem List    Diagnosis Date Noted     Iron deficiency anemia due to chronic blood loss 03/07/2022     Priority: Medium     Prediabetes 03/07/2022     Priority: Medium     BMI 32.0-32.9,adult 01/17/2022     Priority: Medium     Family history of esophageal cancer 01/17/2022     Priority: Medium     Family history of colonic polyps 01/17/2022     Priority: Medium     Father had a colectomy for \" polyps? Serrated adenoma\"        Menorrhagia with regular cycle 01/17/2022     Priority: Medium     Family history of factor V Leiden mutation 01/17/2022     Priority: Medium     Personal tested negative       Intramural leiomyoma of uterus 01/12/2016     Priority: Medium      Past Medical History:   Diagnosis Date     Cyst of left ovary 01/12/2016     none      Past Surgical History:   Procedure Laterality Date     COLONOSCOPY  02/18/2022     no surgical history       Current Outpatient Medications   Medication " "Sig Dispense Refill     ferrous sulfate (FEROSUL) 325 (65 Fe) MG tablet        fish oil-omega-3 fatty acids 1000 MG capsule        tranexamic acid (LYSTEDA) 650 MG tablet          No Known Allergies     Social History     Tobacco Use     Smoking status: Former Smoker     Packs/day: 0.50     Years: 11.00     Pack years: 5.50     Types: Cigarettes, Cigarettes     Start date: 1999     Quit date: 11/15/2009     Years since quittin.5     Smokeless tobacco: Never Used   Substance Use Topics     Alcohol use: Yes     Comment: 0-1 drink weekly     Family History   Problem Relation Age of Onset     Esophageal Cancer Mother 55        esophageal cancer     Blood Disease Mother         factor 5     Diabetes Father         type 2     Respiratory Father         emphasema     Colon Polyps Father         had colon removed     Diabetes Paternal Grandmother      History   Drug Use No         Objective     /79 (BP Location: Right arm, Patient Position: Sitting, Cuff Size: Adult Regular)   Pulse 72   Temp 97.9  F (36.6  C) (Temporal)   Resp 20   Ht 1.613 m (5' 3.5\")   Wt 86.1 kg (189 lb 12.8 oz)   SpO2 98%   BMI 33.09 kg/m      Physical Exam    GENERAL APPEARANCE: healthy, alert, active, no distress, cooperative and obese     EYES: EOMI, PERRL     HENT: ear canals and TM's normal and nose and mouth without ulcers or lesions     NECK: no adenopathy, no asymmetry, masses, or scars and thyroid normal to palpation     RESP: lungs clear to auscultation - no rales, rhonchi or wheezes     CV: regular rates and rhythm, normal S1 S2, no S3 or S4 and no murmur, click or rub     ABDOMEN:  soft, non tender, no HSM or masses and bowel sounds normal     MS: extremities normal- no gross deformities noted, no evidence of inflammation in joints, FROM in all extremities.     SKIN: no suspicious lesions or rashes     NEURO: Normal strength and tone, sensory exam grossly normal, mentation intact and speech normal     PSYCH: mentation " appears normal. and affect normal/bright     LYMPHATICS: No cervical adenopathy    Recent Labs   Lab Test 04/15/22  1011 01/17/22  1111   HGB 13.4 9.8*    302   NA  --  138   POTASSIUM  --  3.9   CR  --  0.77   A1C  --  5.7*        Diagnostics:  No results found for this or any previous visit (from the past 24 hour(s)).   No EKG required, no history of coronary heart disease, significant arrhythmia, peripheral arterial disease or other structural heart disease.    Revised Cardiac Risk Index (RCRI):  The patient has the following serious cardiovascular risks for perioperative complications:   - No serious cardiac risks = 0 points     RCRI Interpretation: 0 points: Class I (very low risk - 0.4% complication rate)           Signed Electronically by: Kerrie Proctor MD  Copy of this evaluation report is provided to requesting physician.

## 2022-06-03 NOTE — PATIENT INSTRUCTIONS
Labs and diagnostics today   If stable will clear for surgery otherwise may need additional work up   Covid testing per surgeon  Take no meds am of surgery  Hold aspirin, antiinflammatories like motrin aleve etc, fish oil and glucosamine preferable 7 days prior to surgery  Will fax /send note to surgeon once completed    Consider  referral for family history of esophageal cancer and colon cancer.  Recommend recheck colonoscopy in 5 years rather than in 10 years, so like in .  Follow-up with me in 3 months postsurgery so we can recheck iron levels and see how you do off the iron postsurgery  Preparing for Your Surgery  Getting started  A nurse will call you to review your health history and instructions. They will give you an arrival time based on your scheduled surgery time. Please be ready to share:  Your doctor's clinic name and phone number  Your medical, surgical and anesthesia history  A list of allergies and sensitivities  A list of medicines, including herbal treatments and over-the-counter drugs  Whether the patient has a legal guardian (ask how to send us the papers in advance)  Please tell us if you're pregnant--or if there's any chance you might be pregnant. Some surgeries may injure a fetus (unborn baby), so they require a pregnancy test. Surgeries that are safe for a fetus don't always need a test, and you can choose whether to have one.   If you have a child who's having surgery, please ask for a copy of Preparing for Your Child's Surgery.    Preparing for surgery  Within 30 days of surgery: Have a pre-op exam (sometimes called an H&P, or History and Physical). This can be done at a clinic or pre-operative center.  If you're having a , you may not need this exam. Talk to your care team.  At your pre-op exam, talk to your care team about all medicines you take. If you need to stop any medicines before surgery, ask when to start taking them again.  We do this for your safety. Many  medicines can make you bleed too much during surgery. Some change how well surgery (anesthesia) drugs work.  Call your insurance company to let them know you're having surgery. (If you don't have insurance, call [phone].)  Call your clinic if there's any change in your health. This includes signs of a cold or flu (sore throat, runny nose, cough, rash, fever). It also includes a scrape or scratch near the surgery site.  If you have questions on the day of surgery, call your hospital or surgery center.  COVID testing  You may need to be tested for COVID-19 before having surgery. If so, we will give you instructions.  Eating and drinking guidelines  For your safety: Unless your surgeon tells you otherwise, follow the guidelines below.  Eat and drink as usual until 8 hours before surgery. After that, no food or milk.  Drink clear liquids until 2 hours before surgery. These are liquids you can see through, like water, Gatorade and Propel Water. You may also have black coffee and tea (no cream or milk).  Nothing by mouth within 2 hours of surgery. This includes gum, candy and breath mints.  If you drink alcohol: Stop drinking it the night before surgery.  If your care team tells you to take medicine on the morning of surgery, it's okay to take it with a sip of water.  Preventing infection  Shower or bathe the night before and morning of your surgery. Follow the instructions your clinic gave you. (If no instructions, use regular soap.)  Don't shave or clip hair near your surgery site. We'll remove the hair if needed.  Don't smoke or vape the morning of surgery. You may chew nicotine gum up to 2 hours before surgery. A nicotine patch is okay.  Note: Some surgeries require you to completely quit smoking and nicotine. Check with your surgeon.  Your care team will make every effort to keep you safe from infection. We will:  Clean our hands often with soap and water (or an alcohol-based hand rub).  Clean the skin at your  surgery site with a special soap that kills germs.  Give you a special gown to keep you warm. (Cold raises the risk of infection.)  Wear special hair covers, masks, gowns and gloves during surgery.  Give antibiotic medicine, if prescribed. Not all surgeries need antibiotics.  What to bring on the day of surgery  Photo ID and insurance card  Copy of your health care directive, if you have one  Glasses and hearing aides (bring cases)  You can't wear contacts during surgery  Inhaler and eye drops, if you use them (tell us about these when you arrive)  CPAP machine or breathing device, if you use them  A few personal items, if spending the night  If you have . . .  A pacemaker, ICD (cardiac defibrillator) or other implant: Bring the ID card.  An implanted stimulator: Bring the remote control.  A legal guardian: Bring a copy of the certified (court-stamped) guardianship papers.  Please remove any jewelry, including body piercings. Leave jewelry and other valuables at home.  If you're going home the day of surgery  You must have a responsible adult drive you home. They should stay with you overnight as well.  If you don't have someone to stay with you, and you aren't safe to go home alone, we may keep you overnight. Insurance often won't pay for this.  After surgery  If it's hard to control your pain or you need more pain medicine, please call your surgeon's office.  Questions?   If you have any questions for your care team, list them here: _________________________________________________________________________________________________________________________________________________________________________ ____________________________________ ____________________________________ ____________________________________  For informational purposes only. Not to replace the advice of your health care provider. Copyright   2003, 2019 [facility]. All rights reserved. Clinically reviewed by Clarissa Watkins MD. [facility]  [id number] - REV 07/21.

## 2022-06-04 LAB
ALBUMIN SERPL-MCNC: 4 G/DL (ref 3.4–5)
ALP SERPL-CCNC: 73 U/L (ref 40–150)
ALT SERPL W P-5'-P-CCNC: 71 U/L (ref 0–50)
ANION GAP SERPL CALCULATED.3IONS-SCNC: 6 MMOL/L (ref 3–14)
AST SERPL W P-5'-P-CCNC: 25 U/L (ref 0–45)
BILIRUB SERPL-MCNC: 0.6 MG/DL (ref 0.2–1.3)
BUN SERPL-MCNC: 13 MG/DL (ref 7–30)
CALCIUM SERPL-MCNC: 9.9 MG/DL (ref 8.5–10.1)
CHLORIDE BLD-SCNC: 106 MMOL/L (ref 94–109)
CO2 SERPL-SCNC: 25 MMOL/L (ref 20–32)
CREAT SERPL-MCNC: 0.76 MG/DL (ref 0.52–1.04)
GFR SERPL CREATININE-BSD FRML MDRD: >90 ML/MIN/1.73M2
GLUCOSE BLD-MCNC: 100 MG/DL (ref 70–99)
POTASSIUM BLD-SCNC: 4 MMOL/L (ref 3.4–5.3)
PROT SERPL-MCNC: 8 G/DL (ref 6.8–8.8)
SODIUM SERPL-SCNC: 137 MMOL/L (ref 133–144)

## 2022-06-06 ENCOUNTER — TELEPHONE (OUTPATIENT)
Dept: OBGYN | Facility: CLINIC | Age: 42
End: 2022-06-06
Payer: COMMERCIAL

## 2022-06-24 DIAGNOSIS — Z01.818 PRE-OP EXAM: Primary | ICD-10-CM

## 2022-06-24 DIAGNOSIS — N92.0 MENORRHAGIA WITH REGULAR CYCLE: ICD-10-CM

## 2022-06-27 ENCOUNTER — ANESTHESIA EVENT (OUTPATIENT)
Dept: SURGERY | Facility: CLINIC | Age: 42
End: 2022-06-27
Payer: COMMERCIAL

## 2022-06-29 ENCOUNTER — LAB (OUTPATIENT)
Dept: LAB | Facility: CLINIC | Age: 42
End: 2022-06-29
Payer: COMMERCIAL

## 2022-06-29 DIAGNOSIS — N92.0 MENORRHAGIA WITH REGULAR CYCLE: ICD-10-CM

## 2022-06-29 DIAGNOSIS — Z20.822 ENCOUNTER FOR LABORATORY TESTING FOR COVID-19 VIRUS: ICD-10-CM

## 2022-06-29 DIAGNOSIS — Z01.818 PRE-OP EXAM: ICD-10-CM

## 2022-06-29 LAB
ABO/RH(D): NORMAL
ANTIBODY SCREEN: NEGATIVE
ERYTHROCYTE [DISTWIDTH] IN BLOOD BY AUTOMATED COUNT: 15 % (ref 10–15)
HCT VFR BLD AUTO: 41.6 % (ref 35–47)
HGB BLD-MCNC: 14.2 G/DL (ref 11.7–15.7)
MCH RBC QN AUTO: 29.2 PG (ref 26.5–33)
MCHC RBC AUTO-ENTMCNC: 34.1 G/DL (ref 31.5–36.5)
MCV RBC AUTO: 86 FL (ref 78–100)
PLATELET # BLD AUTO: 269 10E3/UL (ref 150–450)
RBC # BLD AUTO: 4.86 10E6/UL (ref 3.8–5.2)
SARS-COV-2 RNA RESP QL NAA+PROBE: NEGATIVE
SPECIMEN EXPIRATION DATE: NORMAL
WBC # BLD AUTO: 10 10E3/UL (ref 4–11)

## 2022-06-29 PROCEDURE — 36415 COLL VENOUS BLD VENIPUNCTURE: CPT

## 2022-06-29 PROCEDURE — 86900 BLOOD TYPING SEROLOGIC ABO: CPT

## 2022-06-29 PROCEDURE — 86901 BLOOD TYPING SEROLOGIC RH(D): CPT

## 2022-06-29 PROCEDURE — U0003 INFECTIOUS AGENT DETECTION BY NUCLEIC ACID (DNA OR RNA); SEVERE ACUTE RESPIRATORY SYNDROME CORONAVIRUS 2 (SARS-COV-2) (CORONAVIRUS DISEASE [COVID-19]), AMPLIFIED PROBE TECHNIQUE, MAKING USE OF HIGH THROUGHPUT TECHNOLOGIES AS DESCRIBED BY CMS-2020-01-R: HCPCS

## 2022-06-29 PROCEDURE — 85027 COMPLETE CBC AUTOMATED: CPT

## 2022-06-29 PROCEDURE — 86850 RBC ANTIBODY SCREEN: CPT

## 2022-06-29 PROCEDURE — U0005 INFEC AGEN DETEC AMPLI PROBE: HCPCS

## 2022-07-01 ENCOUNTER — HOSPITAL ENCOUNTER (INPATIENT)
Facility: CLINIC | Age: 42
LOS: 2 days | Discharge: HOME | End: 2022-07-03
Attending: OBSTETRICS & GYNECOLOGY | Admitting: OBSTETRICS & GYNECOLOGY
Payer: COMMERCIAL

## 2022-07-01 ENCOUNTER — ANESTHESIA (OUTPATIENT)
Dept: SURGERY | Facility: CLINIC | Age: 42
End: 2022-07-01
Payer: COMMERCIAL

## 2022-07-01 DIAGNOSIS — Z90.710 S/P ABDOMINAL HYSTERECTOMY: Primary | ICD-10-CM

## 2022-07-01 LAB
GLUCOSE BLDC GLUCOMTR-MCNC: 105 MG/DL (ref 70–99)
HCG UR QL: NEGATIVE

## 2022-07-01 PROCEDURE — 250N000025 HC SEVOFLURANE, PER MIN: Performed by: OBSTETRICS & GYNECOLOGY

## 2022-07-01 PROCEDURE — 360N000076 HC SURGERY LEVEL 3, PER MIN: Performed by: OBSTETRICS & GYNECOLOGY

## 2022-07-01 PROCEDURE — 272N000001 HC OR GENERAL SUPPLY STERILE: Performed by: OBSTETRICS & GYNECOLOGY

## 2022-07-01 PROCEDURE — 250N000011 HC RX IP 250 OP 636: Performed by: ANESTHESIOLOGY

## 2022-07-01 PROCEDURE — 250N000011 HC RX IP 250 OP 636: Performed by: OBSTETRICS & GYNECOLOGY

## 2022-07-01 PROCEDURE — 88112 CYTOPATH CELL ENHANCE TECH: CPT | Mod: TC | Performed by: OBSTETRICS & GYNECOLOGY

## 2022-07-01 PROCEDURE — 58180 PARTIAL HYSTERECTOMY: CPT | Mod: 80 | Performed by: OBSTETRICS & GYNECOLOGY

## 2022-07-01 PROCEDURE — 0UT90ZL RESECTION OF UTERUS, SUPRACERVICAL, OPEN APPROACH: ICD-10-PCS | Performed by: OBSTETRICS & GYNECOLOGY

## 2022-07-01 PROCEDURE — 88302 TISSUE EXAM BY PATHOLOGIST: CPT | Mod: 26 | Performed by: PATHOLOGY

## 2022-07-01 PROCEDURE — 250N000009 HC RX 250: Performed by: NURSE ANESTHETIST, CERTIFIED REGISTERED

## 2022-07-01 PROCEDURE — 250N000011 HC RX IP 250 OP 636: Performed by: NURSE ANESTHETIST, CERTIFIED REGISTERED

## 2022-07-01 PROCEDURE — 250N000013 HC RX MED GY IP 250 OP 250 PS 637

## 2022-07-01 PROCEDURE — C9290 INJ, BUPIVACAINE LIPOSOME: HCPCS | Performed by: ANESTHESIOLOGY

## 2022-07-01 PROCEDURE — 710N000010 HC RECOVERY PHASE 1, LEVEL 2, PER MIN: Performed by: OBSTETRICS & GYNECOLOGY

## 2022-07-01 PROCEDURE — 250N000013 HC RX MED GY IP 250 OP 250 PS 637: Performed by: OBSTETRICS & GYNECOLOGY

## 2022-07-01 PROCEDURE — 250N000011 HC RX IP 250 OP 636

## 2022-07-01 PROCEDURE — 88305 TISSUE EXAM BY PATHOLOGIST: CPT | Mod: 26 | Performed by: PATHOLOGY

## 2022-07-01 PROCEDURE — 81025 URINE PREGNANCY TEST: CPT | Performed by: OBSTETRICS & GYNECOLOGY

## 2022-07-01 PROCEDURE — 0W3P0ZZ CONTROL BLEEDING IN GASTROINTESTINAL TRACT, OPEN APPROACH: ICD-10-PCS | Performed by: OBSTETRICS & GYNECOLOGY

## 2022-07-01 PROCEDURE — 370N000017 HC ANESTHESIA TECHNICAL FEE, PER MIN: Performed by: OBSTETRICS & GYNECOLOGY

## 2022-07-01 PROCEDURE — 0UT70ZZ RESECTION OF BILATERAL FALLOPIAN TUBES, OPEN APPROACH: ICD-10-PCS | Performed by: OBSTETRICS & GYNECOLOGY

## 2022-07-01 PROCEDURE — 258N000003 HC RX IP 258 OP 636: Performed by: NURSE ANESTHETIST, CERTIFIED REGISTERED

## 2022-07-01 PROCEDURE — 88307 TISSUE EXAM BY PATHOLOGIST: CPT | Mod: 26 | Performed by: PATHOLOGY

## 2022-07-01 PROCEDURE — 88305 TISSUE EXAM BY PATHOLOGIST: CPT | Mod: TC | Performed by: OBSTETRICS & GYNECOLOGY

## 2022-07-01 PROCEDURE — 0UT10ZZ RESECTION OF LEFT OVARY, OPEN APPROACH: ICD-10-PCS | Performed by: OBSTETRICS & GYNECOLOGY

## 2022-07-01 PROCEDURE — 58180 PARTIAL HYSTERECTOMY: CPT | Mod: GC | Performed by: OBSTETRICS & GYNECOLOGY

## 2022-07-01 PROCEDURE — 0TJB8ZZ INSPECTION OF BLADDER, VIA NATURAL OR ARTIFICIAL OPENING ENDOSCOPIC: ICD-10-PCS | Performed by: OBSTETRICS & GYNECOLOGY

## 2022-07-01 PROCEDURE — 999N000141 HC STATISTIC PRE-PROCEDURE NURSING ASSESSMENT: Performed by: OBSTETRICS & GYNECOLOGY

## 2022-07-01 PROCEDURE — 258N000003 HC RX IP 258 OP 636

## 2022-07-01 PROCEDURE — 120N000002 HC R&B MED SURG/OB UMMC

## 2022-07-01 PROCEDURE — 88112 CYTOPATH CELL ENHANCE TECH: CPT | Mod: 26 | Performed by: PATHOLOGY

## 2022-07-01 RX ORDER — ONDANSETRON 4 MG/1
4 TABLET, ORALLY DISINTEGRATING ORAL EVERY 6 HOURS PRN
Status: DISCONTINUED | OUTPATIENT
Start: 2022-07-01 | End: 2022-07-03 | Stop reason: HOSPADM

## 2022-07-01 RX ORDER — ONDANSETRON 2 MG/ML
INJECTION INTRAMUSCULAR; INTRAVENOUS PRN
Status: DISCONTINUED | OUTPATIENT
Start: 2022-07-01 | End: 2022-07-01

## 2022-07-01 RX ORDER — SODIUM CHLORIDE, SODIUM LACTATE, POTASSIUM CHLORIDE, CALCIUM CHLORIDE 600; 310; 30; 20 MG/100ML; MG/100ML; MG/100ML; MG/100ML
INJECTION, SOLUTION INTRAVENOUS CONTINUOUS
Status: DISCONTINUED | OUTPATIENT
Start: 2022-07-01 | End: 2022-07-01 | Stop reason: HOSPADM

## 2022-07-01 RX ORDER — METHOCARBAMOL 500 MG/1
500 TABLET, FILM COATED ORAL EVERY 6 HOURS PRN
Status: DISCONTINUED | OUTPATIENT
Start: 2022-07-01 | End: 2022-07-03 | Stop reason: HOSPADM

## 2022-07-01 RX ORDER — DIPHENHYDRAMINE HYDROCHLORIDE 50 MG/ML
25 INJECTION INTRAMUSCULAR; INTRAVENOUS EVERY 6 HOURS PRN
Status: DISCONTINUED | OUTPATIENT
Start: 2022-07-01 | End: 2022-07-03 | Stop reason: HOSPADM

## 2022-07-01 RX ORDER — FENTANYL CITRATE 50 UG/ML
25 INJECTION, SOLUTION INTRAMUSCULAR; INTRAVENOUS EVERY 5 MIN PRN
Status: DISCONTINUED | OUTPATIENT
Start: 2022-07-01 | End: 2022-07-01 | Stop reason: HOSPADM

## 2022-07-01 RX ORDER — SODIUM CHLORIDE, SODIUM LACTATE, POTASSIUM CHLORIDE, CALCIUM CHLORIDE 600; 310; 30; 20 MG/100ML; MG/100ML; MG/100ML; MG/100ML
INJECTION, SOLUTION INTRAVENOUS CONTINUOUS
Status: DISCONTINUED | OUTPATIENT
Start: 2022-07-01 | End: 2022-07-02

## 2022-07-01 RX ORDER — CEFAZOLIN SODIUM/WATER 2 G/20 ML
2 SYRINGE (ML) INTRAVENOUS SEE ADMIN INSTRUCTIONS
Status: DISCONTINUED | OUTPATIENT
Start: 2022-07-01 | End: 2022-07-01 | Stop reason: HOSPADM

## 2022-07-01 RX ORDER — NALOXONE HYDROCHLORIDE 0.4 MG/ML
0.4 INJECTION, SOLUTION INTRAMUSCULAR; INTRAVENOUS; SUBCUTANEOUS
Status: DISCONTINUED | OUTPATIENT
Start: 2022-07-01 | End: 2022-07-01 | Stop reason: HOSPADM

## 2022-07-01 RX ORDER — FENTANYL CITRATE 50 UG/ML
25 INJECTION, SOLUTION INTRAMUSCULAR; INTRAVENOUS
Status: DISCONTINUED | OUTPATIENT
Start: 2022-07-01 | End: 2022-07-01 | Stop reason: HOSPADM

## 2022-07-01 RX ORDER — CEFAZOLIN SODIUM/WATER 2 G/20 ML
2 SYRINGE (ML) INTRAVENOUS
Status: COMPLETED | OUTPATIENT
Start: 2022-07-01 | End: 2022-07-01

## 2022-07-01 RX ORDER — NALOXONE HYDROCHLORIDE 0.4 MG/ML
0.2 INJECTION, SOLUTION INTRAMUSCULAR; INTRAVENOUS; SUBCUTANEOUS
Status: DISCONTINUED | OUTPATIENT
Start: 2022-07-01 | End: 2022-07-01 | Stop reason: HOSPADM

## 2022-07-01 RX ORDER — HYDROMORPHONE HYDROCHLORIDE 1 MG/ML
0.2 INJECTION, SOLUTION INTRAMUSCULAR; INTRAVENOUS; SUBCUTANEOUS EVERY 5 MIN PRN
Status: DISCONTINUED | OUTPATIENT
Start: 2022-07-01 | End: 2022-07-01 | Stop reason: HOSPADM

## 2022-07-01 RX ORDER — OXYCODONE HYDROCHLORIDE 5 MG/1
5 TABLET ORAL EVERY 4 HOURS PRN
Status: DISCONTINUED | OUTPATIENT
Start: 2022-07-01 | End: 2022-07-03 | Stop reason: HOSPADM

## 2022-07-01 RX ORDER — NALOXONE HYDROCHLORIDE 0.4 MG/ML
0.4 INJECTION, SOLUTION INTRAMUSCULAR; INTRAVENOUS; SUBCUTANEOUS
Status: DISCONTINUED | OUTPATIENT
Start: 2022-07-01 | End: 2022-07-03 | Stop reason: HOSPADM

## 2022-07-01 RX ORDER — PROPOFOL 10 MG/ML
INJECTION, EMULSION INTRAVENOUS PRN
Status: DISCONTINUED | OUTPATIENT
Start: 2022-07-01 | End: 2022-07-01

## 2022-07-01 RX ORDER — AMOXICILLIN 250 MG
1 CAPSULE ORAL 2 TIMES DAILY
Status: DISCONTINUED | OUTPATIENT
Start: 2022-07-01 | End: 2022-07-03 | Stop reason: HOSPADM

## 2022-07-01 RX ORDER — FENTANYL CITRATE 50 UG/ML
25-50 INJECTION, SOLUTION INTRAMUSCULAR; INTRAVENOUS
Status: DISCONTINUED | OUTPATIENT
Start: 2022-07-01 | End: 2022-07-01

## 2022-07-01 RX ORDER — DEXAMETHASONE SODIUM PHOSPHATE 4 MG/ML
INJECTION, SOLUTION INTRA-ARTICULAR; INTRALESIONAL; INTRAMUSCULAR; INTRAVENOUS; SOFT TISSUE PRN
Status: DISCONTINUED | OUTPATIENT
Start: 2022-07-01 | End: 2022-07-01

## 2022-07-01 RX ORDER — ONDANSETRON 4 MG/1
4 TABLET, ORALLY DISINTEGRATING ORAL EVERY 30 MIN PRN
Status: DISCONTINUED | OUTPATIENT
Start: 2022-07-01 | End: 2022-07-01 | Stop reason: HOSPADM

## 2022-07-01 RX ORDER — ACETAMINOPHEN 325 MG/1
975 TABLET ORAL ONCE
Status: COMPLETED | OUTPATIENT
Start: 2022-07-01 | End: 2022-07-01

## 2022-07-01 RX ORDER — NALOXONE HYDROCHLORIDE 0.4 MG/ML
0.2 INJECTION, SOLUTION INTRAMUSCULAR; INTRAVENOUS; SUBCUTANEOUS
Status: DISCONTINUED | OUTPATIENT
Start: 2022-07-01 | End: 2022-07-03 | Stop reason: HOSPADM

## 2022-07-01 RX ORDER — SIMETHICONE 80 MG
80 TABLET,CHEWABLE ORAL 4 TIMES DAILY PRN
Status: DISCONTINUED | OUTPATIENT
Start: 2022-07-01 | End: 2022-07-03 | Stop reason: HOSPADM

## 2022-07-01 RX ORDER — FAMOTIDINE 20 MG/1
20 TABLET, FILM COATED ORAL 2 TIMES DAILY
Status: DISCONTINUED | OUTPATIENT
Start: 2022-07-01 | End: 2022-07-03 | Stop reason: HOSPADM

## 2022-07-01 RX ORDER — CALCIUM CARBONATE 500 MG/1
500 TABLET, CHEWABLE ORAL 4 TIMES DAILY PRN
Status: DISCONTINUED | OUTPATIENT
Start: 2022-07-01 | End: 2022-07-03 | Stop reason: HOSPADM

## 2022-07-01 RX ORDER — SODIUM CHLORIDE, SODIUM LACTATE, POTASSIUM CHLORIDE, CALCIUM CHLORIDE 600; 310; 30; 20 MG/100ML; MG/100ML; MG/100ML; MG/100ML
INJECTION, SOLUTION INTRAVENOUS CONTINUOUS PRN
Status: DISCONTINUED | OUTPATIENT
Start: 2022-07-01 | End: 2022-07-01

## 2022-07-01 RX ORDER — OXYCODONE HYDROCHLORIDE 5 MG/1
5 TABLET ORAL EVERY 4 HOURS PRN
Status: DISCONTINUED | OUTPATIENT
Start: 2022-07-01 | End: 2022-07-01 | Stop reason: HOSPADM

## 2022-07-01 RX ORDER — DIPHENHYDRAMINE HCL 25 MG
25 CAPSULE ORAL EVERY 6 HOURS PRN
Status: DISCONTINUED | OUTPATIENT
Start: 2022-07-01 | End: 2022-07-03 | Stop reason: HOSPADM

## 2022-07-01 RX ORDER — ONDANSETRON 2 MG/ML
4 INJECTION INTRAMUSCULAR; INTRAVENOUS EVERY 30 MIN PRN
Status: DISCONTINUED | OUTPATIENT
Start: 2022-07-01 | End: 2022-07-01 | Stop reason: HOSPADM

## 2022-07-01 RX ORDER — BUPIVACAINE HYDROCHLORIDE 2.5 MG/ML
INJECTION, SOLUTION EPIDURAL; INFILTRATION; INTRACAUDAL
Status: COMPLETED | OUTPATIENT
Start: 2022-07-01 | End: 2022-07-01

## 2022-07-01 RX ORDER — ACETAMINOPHEN 325 MG/1
650 TABLET ORAL EVERY 6 HOURS
Status: DISCONTINUED | OUTPATIENT
Start: 2022-07-01 | End: 2022-07-03 | Stop reason: HOSPADM

## 2022-07-01 RX ORDER — PHENAZOPYRIDINE HYDROCHLORIDE 200 MG/1
200 TABLET, FILM COATED ORAL ONCE
Status: COMPLETED | OUTPATIENT
Start: 2022-07-01 | End: 2022-07-01

## 2022-07-01 RX ORDER — KETOROLAC TROMETHAMINE 30 MG/ML
30 INJECTION, SOLUTION INTRAMUSCULAR; INTRAVENOUS EVERY 6 HOURS
Status: COMPLETED | OUTPATIENT
Start: 2022-07-01 | End: 2022-07-02

## 2022-07-01 RX ORDER — AMOXICILLIN 250 MG
2 CAPSULE ORAL 2 TIMES DAILY
Status: DISCONTINUED | OUTPATIENT
Start: 2022-07-01 | End: 2022-07-03 | Stop reason: HOSPADM

## 2022-07-01 RX ORDER — MEPERIDINE HYDROCHLORIDE 25 MG/ML
12.5 INJECTION INTRAMUSCULAR; INTRAVENOUS; SUBCUTANEOUS
Status: DISCONTINUED | OUTPATIENT
Start: 2022-07-01 | End: 2022-07-01 | Stop reason: HOSPADM

## 2022-07-01 RX ORDER — OXYCODONE HYDROCHLORIDE 5 MG/1
10 TABLET ORAL EVERY 4 HOURS PRN
Status: DISCONTINUED | OUTPATIENT
Start: 2022-07-01 | End: 2022-07-03 | Stop reason: HOSPADM

## 2022-07-01 RX ORDER — FLUMAZENIL 0.1 MG/ML
0.2 INJECTION, SOLUTION INTRAVENOUS
Status: DISCONTINUED | OUTPATIENT
Start: 2022-07-01 | End: 2022-07-01 | Stop reason: HOSPADM

## 2022-07-01 RX ORDER — KETOROLAC TROMETHAMINE 30 MG/ML
INJECTION, SOLUTION INTRAMUSCULAR; INTRAVENOUS PRN
Status: DISCONTINUED | OUTPATIENT
Start: 2022-07-01 | End: 2022-07-01

## 2022-07-01 RX ORDER — BISACODYL 10 MG
10 SUPPOSITORY, RECTAL RECTAL DAILY
Status: DISCONTINUED | OUTPATIENT
Start: 2022-07-02 | End: 2022-07-03 | Stop reason: HOSPADM

## 2022-07-01 RX ORDER — LIDOCAINE 40 MG/G
CREAM TOPICAL
Status: DISCONTINUED | OUTPATIENT
Start: 2022-07-01 | End: 2022-07-03 | Stop reason: HOSPADM

## 2022-07-01 RX ADMIN — PROPOFOL 200 MG: 10 INJECTION, EMULSION INTRAVENOUS at 07:58

## 2022-07-01 RX ADMIN — DEXAMETHASONE SODIUM PHOSPHATE 8 MG: 4 INJECTION, SOLUTION INTRAMUSCULAR; INTRAVENOUS at 11:05

## 2022-07-01 RX ADMIN — Medication 5 MG: at 10:35

## 2022-07-01 RX ADMIN — FENTANYL CITRATE 50 MCG: 50 INJECTION, SOLUTION INTRAMUSCULAR; INTRAVENOUS at 09:25

## 2022-07-01 RX ADMIN — SODIUM CHLORIDE, POTASSIUM CHLORIDE, SODIUM LACTATE AND CALCIUM CHLORIDE: 600; 310; 30; 20 INJECTION, SOLUTION INTRAVENOUS at 07:49

## 2022-07-01 RX ADMIN — KETOROLAC TROMETHAMINE 30 MG: 30 INJECTION, SOLUTION INTRAMUSCULAR; INTRAVENOUS at 22:05

## 2022-07-01 RX ADMIN — KETOROLAC TROMETHAMINE 30 MG: 30 INJECTION, SOLUTION INTRAMUSCULAR at 11:45

## 2022-07-01 RX ADMIN — Medication 50 MG: at 07:58

## 2022-07-01 RX ADMIN — BUPIVACAINE 20 ML: 13.3 INJECTION, SUSPENSION, LIPOSOMAL INFILTRATION at 07:37

## 2022-07-01 RX ADMIN — ACETAMINOPHEN 650 MG: 325 TABLET, FILM COATED ORAL at 20:04

## 2022-07-01 RX ADMIN — ACETAMINOPHEN 650 MG: 325 TABLET, FILM COATED ORAL at 13:19

## 2022-07-01 RX ADMIN — Medication 2 G: at 12:00

## 2022-07-01 RX ADMIN — KETOROLAC TROMETHAMINE 30 MG: 30 INJECTION, SOLUTION INTRAMUSCULAR; INTRAVENOUS at 16:19

## 2022-07-01 RX ADMIN — PHENAZOPYRIDINE HYDROCHLORIDE 200 MG: 200 TABLET, FILM COATED ORAL at 06:16

## 2022-07-01 RX ADMIN — MIDAZOLAM HYDROCHLORIDE 1 MG: 1 INJECTION, SOLUTION INTRAMUSCULAR; INTRAVENOUS at 07:37

## 2022-07-01 RX ADMIN — HYDROMORPHONE HYDROCHLORIDE 0.2 MG: 1 INJECTION, SOLUTION INTRAMUSCULAR; INTRAVENOUS; SUBCUTANEOUS at 13:17

## 2022-07-01 RX ADMIN — ONDANSETRON 4 MG: 2 INJECTION INTRAMUSCULAR; INTRAVENOUS at 11:05

## 2022-07-01 RX ADMIN — SODIUM CHLORIDE, POTASSIUM CHLORIDE, SODIUM LACTATE AND CALCIUM CHLORIDE: 600; 310; 30; 20 INJECTION, SOLUTION INTRAVENOUS at 15:29

## 2022-07-01 RX ADMIN — SODIUM CHLORIDE, POTASSIUM CHLORIDE, SODIUM LACTATE AND CALCIUM CHLORIDE: 600; 310; 30; 20 INJECTION, SOLUTION INTRAVENOUS at 09:10

## 2022-07-01 RX ADMIN — ACETAMINOPHEN 975 MG: 325 TABLET ORAL at 06:16

## 2022-07-01 RX ADMIN — HYDROMORPHONE HYDROCHLORIDE 0.5 MG: 1 INJECTION, SOLUTION INTRAMUSCULAR; INTRAVENOUS; SUBCUTANEOUS at 10:05

## 2022-07-01 RX ADMIN — Medication 10 MG: at 09:58

## 2022-07-01 RX ADMIN — Medication 2 G: at 07:58

## 2022-07-01 RX ADMIN — OXYCODONE HYDROCHLORIDE 5 MG: 5 TABLET ORAL at 17:55

## 2022-07-01 RX ADMIN — FENTANYL CITRATE 50 MCG: 50 INJECTION, SOLUTION INTRAMUSCULAR; INTRAVENOUS at 08:34

## 2022-07-01 RX ADMIN — BUPIVACAINE HYDROCHLORIDE 20 ML: 2.5 INJECTION, SOLUTION EPIDURAL; INFILTRATION; INTRACAUDAL at 07:37

## 2022-07-01 RX ADMIN — SENNOSIDES AND DOCUSATE SODIUM 2 TABLET: 50; 8.6 TABLET ORAL at 20:04

## 2022-07-01 RX ADMIN — SODIUM CHLORIDE, POTASSIUM CHLORIDE, SODIUM LACTATE AND CALCIUM CHLORIDE: 600; 310; 30; 20 INJECTION, SOLUTION INTRAVENOUS at 16:20

## 2022-07-01 RX ADMIN — FAMOTIDINE 20 MG: 20 TABLET ORAL at 20:04

## 2022-07-01 RX ADMIN — HYDROMORPHONE HYDROCHLORIDE 0.5 MG: 1 INJECTION, SOLUTION INTRAMUSCULAR; INTRAVENOUS; SUBCUTANEOUS at 11:30

## 2022-07-01 RX ADMIN — Medication 15 MG: at 09:13

## 2022-07-01 RX ADMIN — SUGAMMADEX 200 MG: 100 INJECTION, SOLUTION INTRAVENOUS at 11:50

## 2022-07-01 RX ADMIN — FENTANYL CITRATE 50 MCG: 50 INJECTION, SOLUTION INTRAMUSCULAR; INTRAVENOUS at 07:37

## 2022-07-01 RX ADMIN — FENTANYL CITRATE 50 MCG: 50 INJECTION, SOLUTION INTRAMUSCULAR; INTRAVENOUS at 07:58

## 2022-07-01 RX ADMIN — FENTANYL CITRATE 25 MCG: 50 INJECTION, SOLUTION INTRAMUSCULAR; INTRAVENOUS at 12:38

## 2022-07-01 RX ADMIN — Medication 20 MG: at 08:34

## 2022-07-01 RX ADMIN — HYDROMORPHONE HYDROCHLORIDE 0.2 MG: 1 INJECTION, SOLUTION INTRAMUSCULAR; INTRAVENOUS; SUBCUTANEOUS at 13:28

## 2022-07-01 ASSESSMENT — ACTIVITIES OF DAILY LIVING (ADL)
WALKING_OR_CLIMBING_STAIRS_DIFFICULTY: NO
CONCENTRATING,_REMEMBERING_OR_MAKING_DECISIONS_DIFFICULTY: NO
ADLS_ACUITY_SCORE: 35
ADLS_ACUITY_SCORE: 20
ADLS_ACUITY_SCORE: 22
DOING_ERRANDS_INDEPENDENTLY_DIFFICULTY: NO
DIFFICULTY_EATING/SWALLOWING: NO
DRESSING/BATHING_DIFFICULTY: NO
ADLS_ACUITY_SCORE: 35
ADLS_ACUITY_SCORE: 22
ADLS_ACUITY_SCORE: 35
WEAR_GLASSES_OR_BLIND: YES
CHANGE_IN_FUNCTIONAL_STATUS_SINCE_ONSET_OF_CURRENT_ILLNESS/INJURY: NO
ADLS_ACUITY_SCORE: 22
TOILETING_ISSUES: NO
ADLS_ACUITY_SCORE: 35
ADLS_ACUITY_SCORE: 22
FALL_HISTORY_WITHIN_LAST_SIX_MONTHS: NO
VISION_MANAGEMENT: GLASSES

## 2022-07-01 NOTE — ANESTHESIA CARE TRANSFER NOTE
Patient: Arabella Arrington    Procedure: Procedure(s):  Abdominal Supracervical Hysterectomy, Bilateral Salpingectomy, left oophorectomy, cystoscopy and pelvic washings.       Diagnosis: Abnormal uterine bleeding (AUB) [N93.9]  Uterine leiomyoma, unspecified location [D25.9]  Diagnosis Additional Information: No value filed.    Anesthesia Type:   General     Note:    Oropharynx: oropharynx clear of all foreign objects and spontaneously breathing  Level of Consciousness: awake  Oxygen Supplementation: face mask  Level of Supplemental Oxygen (L/min / FiO2): 6  Independent Airway: airway patency satisfactory and stable  Dentition: dentition unchanged  Vital Signs Stable: post-procedure vital signs reviewed and stable  Report to RN Given: handoff report given  Patient transferred to: PACU  Comments: Pt extubated in the OR without incident or complications. Pt VSS upon arrival to the PACU. Pt has no c/o pain/N/V. Pt care report given to receiving RN.   Handoff Report: Identifed the Patient, Identified the Reponsible Provider, Reviewed the pertinent medical history, Discussed the surgical course, Reviewed Intra-OP anesthesia mangement and issues during anesthesia, Set expectations for post-procedure period and Allowed opportunity for questions and acknowledgement of understanding      Vitals:  Vitals Value Taken Time   /66 07/01/22 1222   Temp     Pulse 89 07/01/22 1223   Resp 16 07/01/22 1223   SpO2 99 % 07/01/22 1223   Vitals shown include unvalidated device data.    Electronically Signed By: EMIL Cook CRNA  July 1, 2022  12:24 PM

## 2022-07-01 NOTE — ANESTHESIA PROCEDURE NOTES
Airway         Procedure Start/Stop Times: 7/1/2022 8:00 AM  Staff -        CRNA: Darian Beckwith APRN CRNA       Performed By: CRNA  Consent for Airway        Urgency: elective  Indications and Patient Condition       Indications for airway management: flor-procedural       Induction type:intravenous       Mask difficulty assessment: 2 - vent by mask + OA or adjuvant +/- NMBA    Final Airway Details       Final airway type: endotracheal airway       Successful airway: ETT - single  Endotracheal Airway Details        ETT size (mm): 6.5       Cuffed: yes       Cuff volume (mL): 8       Successful intubation technique: direct laryngoscopy       DL Blade Type: Jacinto 2       Grade View of Cords: 1       Adjucts: stylet       Position: Right       Measured from: gums/teeth       Secured at (cm): 22       Bite block used: None    Post intubation assessment        Placement verified by: capnometry, equal breath sounds and chest rise        Number of attempts at approach: 1       Number of other approaches attempted: 0       Secured with: pink tape       Ease of procedure: easy       Dentition: Intact and Unchanged    Medication(s) Administered   Medication Administration Time: 7/1/2022 8:00 AM

## 2022-07-01 NOTE — PROGRESS NOTES
Planned procedure reviewed with patient (total abdominal hysterectomy, bilateral salpingectomy, pelvic washings, possible oophorectomy or bilateral oophorectomy and cystoscopy). We discussed that in the event that her final pathology shows a malignancy she may need further surgery with GYN Oncology but based on age an imaging characteristics that is very unlikely (~5%). We also discussed that in the unlikely event that we saw clear signs of carcinoma at the time of her procedure today we would attempt to obtain an intraoperative GYN Oncology consult and that they may recommend a staging procedure including removal of lymph nodes and omentum and pelvic/peritoneal biopsies. She verbalized agreement with proceeding with other procedures as indicated in the event that we encounter unexpected findings during her procedure, although this is not anticipated. Risks of the procedure reviewed including risk of bleeding, infection, damage to bowel/bladder/ureters and surrounding structures and poor wound healing. Patient also consented for blood transfusion if needed.     Kasandra Multani MD

## 2022-07-01 NOTE — OR NURSING
PACU to Inpatient Nursing Handoff    Patient Arabella Arrington is a 41 year old female who speaks English.   Procedure Procedure(s):  Abdominal Supracervical Hysterectomy, Bilateral Salpingectomy, left oophorectomy, cystoscopy and pelvic washings.   Surgeon(s) Primary: Kasandra Multani MD  Assisting: Jose Armando Parrish MD; Roro Kay MD  Resident - Assisting: Steffany Griffin MD; Hemant Morse MD     No Known Allergies    Isolation  No active isolations     Past Medical History   has a past medical history of Cyst of left ovary (01/12/2016) and none.    Anesthesia Other   Dermatome Level     Preop Meds acetaminophen (Tylenol) - time given: 0615  phenazopyridine (Pyridium) - time given: 0615  1 mg versed  - time given: 0630   Nerve block Transversus abdominus plane (TAP).  Location:bilateral. Med:bupivacaine. Time given: 0800   Intraop Meds fentanyl (Sublimaze): 150 mcg total  hydromorphone (Dilaudid): 1.0 mg total  ketorolac (Toradol): last given at 1145  ondansetron (Zofran): last given at 1105   Local Meds No   Antibiotics cefazolin (Ancef) - last given at 0800     Pain Patient Currently in Pain: yes   PACU meds  acetaminophen (Tylenol): 650 mg (total dose) last given at 1320   fentanyl (Sublimaze): 25 mcg (total dose) last given at 1238   hydromorphone (Dilaudid): 0.4 mg (total dose) last given at 1328    PCA / epidural No   Capnography     Telemetry ECG Rhythm: Normal sinus rhythm   Inpatient Telemetry Monitor Ordered? No        Labs Glucose Lab Results   Component Value Date     07/01/2022     06/03/2022    GLC 90 12/01/2015       Hgb Lab Results   Component Value Date    HGB 14.2 06/29/2022    HGB 12.3 12/01/2015       INR No results found for: INR   PACU Imaging Not applicable     Wound/Incision Incision/Surgical Site 07/01/22 Midline Abdomen (Active)   Incision Assessment UTV 07/01/22 1335   Berkley-Incision Assessment UTV 07/01/22 1222   Closure Sutures 07/01/22 1139    Incision Drainage Amount None 07/01/22 1335   Incision Care Adhesive strips applied 07/01/22 1139   Dressing Intervention Clean, dry, intact 07/01/22 1335   Number of days: 0      CMS        Equipment Not applicable   Other LDA       IV Access Peripheral IV 07/01/22 Left Hand (Active)   Site Assessment WDL 07/01/22 1222   Line Status Infusing 07/01/22 1222   Dressing Intervention New dressing  07/01/22 0626   Phlebitis Scale 0-->no symptoms 07/01/22 0626   Infiltration Scale 0 07/01/22 0626   Number of days: 0      Blood Products Not applicable  mL   Intake/Output Date 07/01/22 0700 - 07/02/22 0659   Shift 0563-7991 9807-1505 0907-9033 24 Hour Total   INTAKE   I.V. 1200   1200   Shift Total(mL/kg) 1200(13.83)   1200(13.83)   OUTPUT   Urine 900   900   Blood 200   200   Shift Total(mL/kg) 1100(12.67)   1100(12.67)   Weight (kg) 86.8 86.8 86.8 86.8      Drains / Chu Urethral Catheter 07/01/22 Latex 16 fr (Active)   Tube Description UTV 07/01/22 1222   Collection Container Standard 07/01/22 1335   Securement Method Securing device (Describe) 07/01/22 1335   Urine Output 200 mL 07/01/22 1335   Number of days: 0      Time of void PreOp Void Prior to Procedure:  (0600) (07/01/22 0613)    PostOp      Diapered? No   Bladder Scan     PO    water     Vitals    B/P: 102/61  T: 98.6  F (37  C)    Temp src: Oral  P:  Pulse: 68 (07/01/22 1330)          R: 16  O2:  SpO2: 95 %    O2 Device: None (Room air) (07/01/22 0541)    Oxygen Delivery: 2 LPM (07/01/22 1245)         Family/support present SO Juan Luis is at home waiting   Patient belongings     Patient transported on cart   DC meds/scripts (obs/outpt) Not applicable   Inpatient Pain Meds Released? Yes       Special needs/considerations None   Tasks needing completion None       Tosin Villela, RN  ASCOM [id number]

## 2022-07-01 NOTE — ANESTHESIA PROCEDURE NOTES
TAP Procedure Note    Pre-Procedure   Staff -        Anesthesiologist:  Lauren Nguyen MD       Performed By: anesthesiologist       Location: pre-op       Procedure Start/Stop Times: 7/1/2022 7:37 AM and 7/1/2022 7:45 AM       Pre-Anesthestic Checklist: patient identified, IV checked, site marked, risks and benefits discussed, informed consent, monitors and equipment checked, pre-op evaluation, at physician/surgeon's request and post-op pain management  Timeout:       Correct Patient: Yes        Correct Procedure: Yes        Correct Site: Yes        Correct Position: Yes        Correct Laterality: Yes        Site Marked: Yes  Procedure Documentation  Procedure: TAP       Diagnosis: POST OPERATIVE PAIN       Laterality: bilateral       Patient Position: supine       Patient Prep/Sterile Barriers: sterile gloves, mask       Skin prep: Chloraprep       Needle Type: short bevel       Needle Gauge: 21.        Needle Length (millimeters): 110        Ultrasound guided       1. Ultrasound was used to identify targeted nerve, plexus, vascular marker, or fascial plane and place a needle adjacent to it in real-time.       2. Ultrasound was used to visualize the spread of anesthetic in close proximity to the above referenced structure.       3. A permanent image is entered into the patient's record.    Assessment/Narrative         The placement was negative for: blood aspirated, painful injection and site bleeding       Paresthesias: No.       Bolus given via needle..        Secured via.        Insertion/Infusion Method: Single Shot       Complications: none       Injection made incrementally with aspirations every 5 mL.    Medication(s) Administered   Bupivacaine 0.25% PF (Infiltration) - Infiltration   20 mL - 7/1/2022 7:37:00 AM  Bupivacaine liposome (Exparel) 1.3% LA inj susp (Infiltration) - Infiltration   20 mL - 7/1/2022 7:37:00 AM  Medication Administration Time: 7/1/2022 7:37 AM     Comments:  Discussed risks of  nerve block, including nerve injury, bleeding, infection, incomplete analgesia. Discussed alternative of not performing a nerve block. Ensured understanding, invited questions and all questions were answered. Patient wishes to proceed.    Informed consent was obtained.   Patient tolerated well. Incremental aspiration every 5 mL. No paresthesia, no heme. Needle tip visualized throughout with appropriate spread of local anesthetic in fascial plane.   Block was placed at the surgeon's request for post operative pain control.

## 2022-07-01 NOTE — BRIEF OP NOTE
[facility]    Brief Operative Note    Pre-operative diagnosis: Abnormal uterine bleeding (AUB) [N93.9]  Uterine leiomyoma, unspecified location [D25.9]  Post-operative diagnosis Same as pre-operative diagnosis, endometriosis    Procedure: Procedure(s):  Abdominal Supracervical Hysterectomy, Bilateral Salpingectomy, left oophorectomy, cystoscopy and pelvic washings.  Surgeon: Surgeon(s) and Role:     * Kasandra Multani MD - Primary     * Roro Kay MD - Assisting     * Jose Armando Parrish MD - Assisting     * Steffany Griffin MD - Resident - Assisting     * Hemant Morse MD - Resident - Assisting     * Isaura Yeboah MD  Anesthesia: Other   Estimated Blood Loss: 200 mL from 7/1/2022  7:46 AM to 7/1/2022 12:18 PM      Drains:  gould  Specimens:   ID Type Source Tests Collected by Time Destination   1 : Pelvic washing Washings Pelvis NON-GYNECOLOGIC CYTOLOGY Kasandra Multani MD 7/1/2022  8:36 AM    2 : Uterus Tissue Uterus SURGICAL PATHOLOGY EXAM Kasandra Multani MD 7/1/2022 10:21 AM    3 : Ovary and Fallopian Tube, Left Tissue Ovary and Fallopian Tube, Left SURGICAL PATHOLOGY EXAM Kasandra Multani MD 7/1/2022 10:23 AM    4 : Fallopian Tube, Right Tissue Fallopian Tube, Right SURGICAL PATHOLOGY EXAM Kasandra Multani MD 7/1/2022 10:23 AM    5 : Uterine Fibroids Tissue Other SURGICAL PATHOLOGY EXAM Kasandra Multani MD 7/1/2022 10:28 AM      Findings:   Large fibroid uterus, normal bilateral fallopian tubes, normal-appearing R ovary. 12cm endometrioma coming from L ovary, filling posterior cul-de-sac, w/ filmy adhesions to the uterus and rectum, adherent to R ovary  Complications: None.  Implants: * No implants in log *    Steffany Griffin MD MSc  OBGYN Resident, PGY4  07/01/2022 12:23 PM  Gynecology Pager [phone]

## 2022-07-01 NOTE — PROGRESS NOTES
Pt arrived to the floor around 1500    C/O of a numbness sensation on the R thumb.     Pain has been rated from 3-6/10-- controlled with medications (see MAR).     Primapore dressing on the midline abdominal is CDI.     Chu in place, with output that is mildly red.     Ate some food without N/V.

## 2022-07-01 NOTE — PROGRESS NOTES
Gynecology Post-Operative Check Progress Note     S: Pt feels well. Pain is well controlled following procedure.  No SOB, CP.  Has had some sips of water without nausea. Voiding via Gould catheter, has not yet attempted ambulation.     O:  Vitals:    07/01/22 1315 07/01/22 1330 07/01/22 1400 07/01/22 1459   BP: 100/59 102/61 100/55 101/57   BP Location:    Right arm   Pulse: 77 68 59    Resp: 15 16 16 20   Temp:    98.7  F (37.1  C)   TempSrc:    Oral   SpO2: 97% 95% 99% 95%   Weight:       Height:            Gen: comfortable, no acute distress  CV: RRR, no murmurs  Lungs: CTAB, appropriate work of breathing  Abd: soft, appropriately tender.  Vertical midline incision with sterile dressing clean, dry, and intact.  : Gould in place draining light yellow urine.  Ext: warm and well perfused, SCDs in place, no edema.    Hemoglobin   Date Value Ref Range Status   06/29/2022 14.2 11.7 - 15.7 g/dL Final   06/03/2022 13.3 11.7 - 15.7 g/dL Final   12/01/2015 12.3 11.7 - 15.7 g/dL Final   03/13/2014 12.8 11.7 - 15.7 g/dL Final       A/P:Arabella Arrington is a 41 year old female POD #0 s/p abdominal supracervical hysterectomy, bilateral salpingectomy, left oophorectomy, cystoscopy, and pelvic washings.    FEN: continue IVF until tolerating adequate PO intake, advance diet as tolerated  Pain: doing well on scheduled tylenol, Toradol  : gould catheter still in place, remove in the morning.   Heme: Hgb 14.2 > EBL 200cc > Hgb pending    Dispo: Anticipate discharge to home POD#2 or when meeting postoperative goals    Hemant Morse MD  OB/GYN PGY-1  07/01/2022 4:24 PM

## 2022-07-01 NOTE — DISCHARGE SUMMARY
Gynecology Discharge Summary    Arabella Arrington    : 1980  MRN: [id number]            Date of Admission:  2022  Date of Discharge:  7/3/2022  Admitting Physician:  Kasandra Multani MD  Discharge Physician:  Blaire Weiner MD  Discharging Service:  Gynecology     Primary Provider: Kerrie Proctor          Admission Diagnoses:   - Fibroid uterus  - Endometriosis           Discharge Diagnosis:   Same as admission diagnosis             Procedures:   - Abdominal supracervical hysterectomy  - Bilateral salpingectomy  - Left oophorectomy  - Cystoscopy  - Pelvic washings          Medications Prior to Admission:     Medications Prior to Admission   Medication Sig Dispense Refill Last Dose     ferrous sulfate (FEROSUL) 325 (65 Fe) MG tablet    2022     fish oil-omega-3 fatty acids 1000 MG capsule    2022     [DISCONTINUED] tranexamic acid (LYSTEDA) 650 MG tablet    Past Month at Unknown time             Discharge Medications:        Review of your medicines      START taking      Dose / Directions   acetaminophen 325 MG tablet  Commonly known as: TYLENOL      Dose: 650 mg  Take 2 tablets (650 mg) by mouth every 6 hours as needed for mild pain  Refills: 0     ibuprofen 200 MG tablet  Commonly known as: ADVIL/MOTRIN      Dose: 600 mg  Take 3 tablets (600 mg) by mouth every 6 hours as needed for moderate pain  Refills: 0     oxyCODONE 5 MG tablet  Commonly known as: ROXICODONE      Dose: 5 mg  Take 1 tablet (5 mg) by mouth every 6 hours as needed for pain  Quantity: 12 tablet  Refills: 0     senna-docusate 8.6-50 MG tablet  Commonly known as: SENOKOT-S/PERICOLACE      Dose: 1 tablet  Take 1 tablet by mouth daily  Refills: 0        CONTINUE these medicines which have NOT CHANGED      Dose / Directions   ferrous sulfate 325 (65 Fe) MG tablet  Commonly known as: FEROSUL      Refills: 0     fish oil-omega-3 fatty acids 1000 MG capsule      Refills: 0        STOP taking    tranexamic acid 650 MG  tablet  Commonly known as: LYSTEDA              Where to get your medicines      These medications were sent to [facility] [place], [place] - [address]  [address], [facility] [address]    Phone: [phone]     oxyCODONE 5 MG tablet     Some of these will need a paper prescription and others can be bought over the counter. Ask your nurse if you have questions.    You don't need a prescription for these medications    acetaminophen 325 MG tablet    ibuprofen 200 MG tablet    senna-docusate 8.6-50 MG tablet              Consultations:   none            Brief History of Illness:   Ms. Arabella Arrington is a 41 year old  with known uterine fibroids and abnormal uterine bleeding. On ultrasound 2022 a pedunculated fibroid measuring 8.9 x 8.9 x 7.6 cm and another measuring 9.5 x 7.6 x 8.0 cm, as well as other smaller fibroids. MRI 3/ demonstrated the same fibroids measuring 7.4 and 7.3 cm and bilateral adnexal masses with hemorrhagic debris, both measuring 12 cm, concerning for complex bilateral hematosalpinx. She was seen in clinic and surgical management with hysterectomy and bilateral salpingectomy was recommended. The risks, benefits, and alternatives of surgery were discussed, and she agreed to proceed.          Hospital Course:     Her intraoperative course was uncomplicated. EBL 200mL. Please see operative note for details.     Her postoperative course was ununcomplicated.She was initially maintained on IV fluids with IV pain medications and gould catheter in place. On POD#1, diet was advanced, gould catheter was removed, and she was transitioned to PO pain medications. On POD#2 she was tolerating regular diet, ambulating without difficulty, voiding spontaneously, and controlling pain with PO medications, and she was deemed stable for discharge. Hemoglobin at the time of discharge was 10.2. Please see progress note on the day of discharge for further details of exam  and findings.            Discharge Instructions and Follow-Up:   Discharge diet: Regular   Discharge activity: Activity as tolerated  No lifting >15 lbs for 6 weeks  Nothing in vagina for 6 weeks   Discharge follow-up: Follow up with Dr. Multani in 6 weeks   Other instructions: Call clinic or return to the ED with temp. > 100.4F, pain not controlled by any pain medications, or uncontrolled nausea/vomiting.       Ijeoma Aparicio MD  OB/GYN Resident PGY-3  5:39 PM July 3, 2022        Physician Attestation   I, Blaire Weiner, have seen and examined this patient.  I have reviewed the above note and agree with discharge plan as documented in the above note.     Blaire Weiner MD  Date of Service (when I saw the patient): 7/3/22

## 2022-07-01 NOTE — ANESTHESIA POSTPROCEDURE EVALUATION
Patient: Arabella Arrington    Procedure: Procedure(s):  Abdominal Supracervical Hysterectomy, Bilateral Salpingectomy, left oophorectomy, cystoscopy and pelvic washings.       Anesthesia Type:  General    Note:  Disposition: Admission   Postop Pain Control: Uneventful            Sign Out: Well controlled pain   PONV: No   Neuro/Psych: Uneventful            Sign Out: Acceptable/Baseline neuro status   Airway/Respiratory: Uneventful            Sign Out: Acceptable/Baseline resp. status   CV/Hemodynamics: Uneventful            Sign Out: Acceptable CV status; No obvious hypovolemia; No obvious fluid overload   Other NRE: NONE   DID A NON-ROUTINE EVENT OCCUR? No           Last vitals:  Vitals Value Taken Time   /61 07/01/22 1333   Temp 37  C (98.6  F) 07/01/22 1300   Pulse 63 07/01/22 1334   Resp 15 07/01/22 1334   SpO2 100 % 07/01/22 1334   Vitals shown include unvalidated device data.    Electronically Signed By: Jose Armando Westfall MD  July 1, 2022  1:35 PM

## 2022-07-02 LAB
GLUCOSE BLDC GLUCOMTR-MCNC: 123 MG/DL (ref 70–99)
HGB BLD-MCNC: 10.2 G/DL (ref 11.7–15.7)
HOLD SPECIMEN: NORMAL

## 2022-07-02 PROCEDURE — 250N000011 HC RX IP 250 OP 636

## 2022-07-02 PROCEDURE — 258N000003 HC RX IP 258 OP 636

## 2022-07-02 PROCEDURE — 85018 HEMOGLOBIN: CPT

## 2022-07-02 PROCEDURE — 36415 COLL VENOUS BLD VENIPUNCTURE: CPT

## 2022-07-02 PROCEDURE — 99252 IP/OBS CONSLTJ NEW/EST SF 35: CPT | Mod: 57 | Performed by: SURGERY

## 2022-07-02 PROCEDURE — 250N000013 HC RX MED GY IP 250 OP 250 PS 637

## 2022-07-02 PROCEDURE — 120N000002 HC R&B MED SURG/OB UMMC

## 2022-07-02 RX ORDER — AMOXICILLIN 250 MG
1 CAPSULE ORAL DAILY
COMMUNITY
Start: 2022-07-02 | End: 2022-10-06

## 2022-07-02 RX ORDER — OXYCODONE HYDROCHLORIDE 5 MG/1
5 TABLET ORAL EVERY 6 HOURS PRN
Qty: 12 TABLET | Refills: 0 | Status: SHIPPED | OUTPATIENT
Start: 2022-07-02 | End: 2022-07-05

## 2022-07-02 RX ORDER — IBUPROFEN 200 MG
600 TABLET ORAL EVERY 6 HOURS PRN
COMMUNITY
Start: 2022-07-02 | End: 2022-08-01

## 2022-07-02 RX ORDER — ACETAMINOPHEN 325 MG/1
650 TABLET ORAL EVERY 6 HOURS PRN
COMMUNITY
Start: 2022-07-02 | End: 2022-08-01

## 2022-07-02 RX ADMIN — ACETAMINOPHEN 650 MG: 325 TABLET, FILM COATED ORAL at 06:52

## 2022-07-02 RX ADMIN — ACETAMINOPHEN 650 MG: 325 TABLET, FILM COATED ORAL at 01:15

## 2022-07-02 RX ADMIN — METHOCARBAMOL 500 MG: 500 TABLET ORAL at 18:41

## 2022-07-02 RX ADMIN — OXYCODONE HYDROCHLORIDE 5 MG: 5 TABLET ORAL at 17:13

## 2022-07-02 RX ADMIN — KETOROLAC TROMETHAMINE 30 MG: 30 INJECTION, SOLUTION INTRAMUSCULAR; INTRAVENOUS at 04:44

## 2022-07-02 RX ADMIN — ACETAMINOPHEN 650 MG: 325 TABLET, FILM COATED ORAL at 18:41

## 2022-07-02 RX ADMIN — KETOROLAC TROMETHAMINE 30 MG: 30 INJECTION, SOLUTION INTRAMUSCULAR; INTRAVENOUS at 10:40

## 2022-07-02 RX ADMIN — FAMOTIDINE 20 MG: 20 TABLET ORAL at 19:40

## 2022-07-02 RX ADMIN — SODIUM CHLORIDE, POTASSIUM CHLORIDE, SODIUM LACTATE AND CALCIUM CHLORIDE: 600; 310; 30; 20 INJECTION, SOLUTION INTRAVENOUS at 02:43

## 2022-07-02 RX ADMIN — SENNOSIDES AND DOCUSATE SODIUM 2 TABLET: 50; 8.6 TABLET ORAL at 08:51

## 2022-07-02 RX ADMIN — ACETAMINOPHEN 650 MG: 325 TABLET, FILM COATED ORAL at 13:33

## 2022-07-02 RX ADMIN — SENNOSIDES AND DOCUSATE SODIUM 1 TABLET: 50; 8.6 TABLET ORAL at 19:41

## 2022-07-02 RX ADMIN — FAMOTIDINE 20 MG: 20 TABLET ORAL at 08:51

## 2022-07-02 ASSESSMENT — ACTIVITIES OF DAILY LIVING (ADL)
ADLS_ACUITY_SCORE: 22

## 2022-07-02 NOTE — PLAN OF CARE
"Pt. Had supracervical hysterectomy, bilateral salpingectomy and left oophorectomy, cystoscopy and pelvic washing    /58 (BP Location: Right arm, Patient Position: Supine, Cuff Size: Adult Regular)   Pulse 66   Temp 98.5  F (36.9  C) (Oral)   Resp 27   Ht 1.613 m (5' 3.5\")   Wt 86.8 kg (191 lb 5.8 oz)   SpO2 93%   BMI 33.36 kg/m      Pt alert and oriented x 1. Denies sob and on ra  CAPNO on. Pt. C/O abdominal pain rating  2 at rest and 9 during movement. Toradol and tylenol given.    Incision site CDI with prima pore dressing. Abdominal binder on.    Small amount of vaginal bleeding.  C/o numbness on Left thumb.  Left hand PIV infusing LR @ 100 ml /hr    "

## 2022-07-02 NOTE — PROGRESS NOTES
Gynecology Progress Note     S: Pt feels well. Pain is well controlled with Tylenol and Toradol.  No SOB, CP. Eating with no nausea/vomiting. Voiding via Gould catheter, has ambulated minimally. Not yet passing flatus.    O:  Vitals:    07/01/22 1700 07/01/22 1730 07/02/22 0115 07/02/22 0123   BP: 103/60 102/58 (!) 143/68 104/58   BP Location: Right arm  Right arm Right arm   Patient Position:   Supine Supine   Cuff Size:   Adult Regular Adult Regular   Pulse: 61 63 74 66   Resp: 21 26 27    Temp:   98.5  F (36.9  C)    TempSrc:   Oral    SpO2: 95% 94% 93%    Weight:       Height:            Gen: comfortable, no acute distress  CV: warm, well-perfused  Lungs: appropriate work of breathing  Abd: soft, appropriately tender.  Vertical midline incision with sterile dressing clean, dry, and intact.  : Gould in place draining light yellow urine.  Ext: warm and well perfused, SCDs in place, no edema.    Hemoglobin   Date Value Ref Range Status   06/29/2022 14.2 11.7 - 15.7 g/dL Final   06/03/2022 13.3 11.7 - 15.7 g/dL Final   12/01/2015 12.3 11.7 - 15.7 g/dL Final   03/13/2014 12.8 11.7 - 15.7 g/dL Final       A/P:Arabella Arrington is a 41 year old female POD #0 s/p abdominal supracervical hysterectomy, bilateral salpingectomy, left oophorectomy, cystoscopy, and pelvic washings.    FEN: Tolerating PO intake, fluids discontinued  Pain: doing well on scheduled tylenol, Toradol, PRN oxycodone  : gould catheter still in place, remove this morning  Heme: Hgb 14.2 > EBL 200cc > AM Hgb 10.2    Dispo: Anticipate discharge to home POD#2 or when meeting postoperative goals    Hemant Morse MD  OB/GYN PGY-1  07/02/2022 6:41 AM     Physician Attestation   IBreonna MD, personally examined and evaluated this patient.  I discussed the patient with the resident/fellow and care team, and agree with the assessment and plan of care as documented in the note of 7/02/22.      I personally reviewed vital signs,  medications, labs and exam.    Key findings: Doing well. Chu out and voiding. Pain controlled with minimal oxycodone. Awaiting flatus.   Anticipate discharge tomorrow if flatus.   May shower and remove bandage.   Breonna Nowak MD  Date of Service (when I saw the patient): 07/02/22

## 2022-07-02 NOTE — PLAN OF CARE
Pt asked for PRN oxy for 6 out of 10 pain. Pt states that the med was not affective but was just up and moving during this time. Pt was given PRN robaxin and stated that was affective. States pain is mainly when moving. Chu was taken out this AM. Given the surgical wound AM RN was unsure how to proceed to obtain PVR, but pt has been able to ambulate to the bathroom with standby assistance and void. Pt has had some blood spotting but pt understands that is understandable given the surgery.  Pt mentioned that she was able to pass some gas but no BM this shift.  Pt ordered dinner and was able to tolerate it. Partner came to visit pt this shift.

## 2022-07-02 NOTE — OP NOTE
Procedure Date: 07/01/2022    PREOPERATIVE DIAGNOSIS:  Possible rectal injury.    POSTOPERATIVE DIAGNOSIS:  No rectal injury.    PROCEDURES:  1.  Exploration of pelvis.  2.  Examination of rectum.    STAFF SURGEON:  Jose Armando Parrish MD    RESIDENT SURGEON:  None.    CLINICAL HISTORY:  Arabella Arrington is a 41-year-old female, who was being cared for by the GYN Service, and during a procedure to remove an endometrioma from the rectum, bleeding was noted and concern for possible serosal tear was of high interest by our Obstetric Service.  The patient's condition was deemed significant enough to require General Surgery examination of the rectum, the pelvis, and exploration for possible rectal injury.    DESCRIPTION OF PROCEDURE:  Arabella Arrington was already in the operating room under general anesthesia, cared for by the Obstetrical Service at the [facility].  The abdomen was opened with the lower midline incision.  Excellent exposure of the rectum and pelvis was available.  Following the examination, the area of the rectum was carefully examined and areas of hemorrhage from the perirectal fat were controlled with electrocautery.  This was done in a manner to avoid any electrical injury to the rectum or surrounding pelvic structures.  Following examination using a Damaris, sweetheart, malleable and other retraction devices as well as several sponge sticks, it was possible to examine carefully the rectal tissue, and no rectal injury was identified.  No spillage was identified, and a complete examination was possible.  The area was irrigated with sterile normal saline and compression of the sigmoid demonstrated no emerging bubbles from the area of question and after irrigation and washout of the pelvis, it was deemed that there was no rectal injury and the General Surgery Service scrubbed out at that time.    Jose Armando Parrish MD        D: 07/02/2022   T: 07/02/2022   MT: acd    Name:      ATA FARRELL  MRN:      [id number]        Account:        [id number]   :      1980           Procedure Date: 2022     Document: [id number]

## 2022-07-02 NOTE — PROGRESS NOTES
Pt is an assist of 1 with a walker and gait belt.      Chu was removed today-- pt was able to void without difficulties.  CAPNO discontinued today. IVF fluids stopped.      Primapore bandage has scant amount of drainage noted.  Abdominal binder in place.      Pain has been rated from 0-6/10-- controlled with medications (see MAR) and positioning.  No pain when at rest.      Pt declined suppository this AM.  No reports of any passing gas-- bowel sounds are audible. Last BM was before surgery on 07/01.

## 2022-07-03 VITALS
BODY MASS INDEX: 32.67 KG/M2 | HEIGHT: 64 IN | SYSTOLIC BLOOD PRESSURE: 112 MMHG | DIASTOLIC BLOOD PRESSURE: 66 MMHG | RESPIRATION RATE: 16 BRPM | HEART RATE: 81 BPM | WEIGHT: 191.36 LBS | OXYGEN SATURATION: 98 % | TEMPERATURE: 98.7 F

## 2022-07-03 PROCEDURE — 250N000013 HC RX MED GY IP 250 OP 250 PS 637

## 2022-07-03 PROCEDURE — 250N000013 HC RX MED GY IP 250 OP 250 PS 637: Performed by: OBSTETRICS & GYNECOLOGY

## 2022-07-03 RX ORDER — IBUPROFEN 600 MG/1
600 TABLET, FILM COATED ORAL EVERY 6 HOURS
Status: DISCONTINUED | OUTPATIENT
Start: 2022-07-03 | End: 2022-07-03 | Stop reason: HOSPADM

## 2022-07-03 RX ADMIN — METHOCARBAMOL 500 MG: 500 TABLET ORAL at 03:28

## 2022-07-03 RX ADMIN — FAMOTIDINE 20 MG: 20 TABLET ORAL at 08:00

## 2022-07-03 RX ADMIN — OXYCODONE HYDROCHLORIDE 5 MG: 5 TABLET ORAL at 01:39

## 2022-07-03 RX ADMIN — SIMETHICONE 80 MG: 80 TABLET, CHEWABLE ORAL at 01:39

## 2022-07-03 RX ADMIN — ACETAMINOPHEN 650 MG: 325 TABLET, FILM COATED ORAL at 08:00

## 2022-07-03 RX ADMIN — ACETAMINOPHEN 650 MG: 325 TABLET, FILM COATED ORAL at 13:04

## 2022-07-03 RX ADMIN — SENNOSIDES AND DOCUSATE SODIUM 2 TABLET: 50; 8.6 TABLET ORAL at 08:00

## 2022-07-03 RX ADMIN — ACETAMINOPHEN 650 MG: 325 TABLET, FILM COATED ORAL at 01:29

## 2022-07-03 RX ADMIN — IBUPROFEN 600 MG: 600 TABLET ORAL at 16:06

## 2022-07-03 RX ADMIN — IBUPROFEN 600 MG: 600 TABLET ORAL at 10:56

## 2022-07-03 ASSESSMENT — ACTIVITIES OF DAILY LIVING (ADL)
ADLS_ACUITY_SCORE: 22

## 2022-07-03 NOTE — PROGRESS NOTES
Gynecology Progress Note     S: Pt feels well. Pain is overall well controlled with Tylenol and PRN oxycodone, although she notes some incisional vs gas pain.  No SOB, CP. Tolerating PO without nausea or vomiting. Voiding spontaneously and passing flatus. Ambulating without lightheadedness or dizziness.     O:  Vitals:    07/02/22 0800 07/02/22 1534 07/02/22 2211 07/03/22 0806   BP: 98/54 113/61 123/65 130/79   BP Location: Right arm Right arm Left arm Left arm   Patient Position:       Cuff Size:       Pulse: 79 66 84 82   Resp: 16 16 16 16   Temp: 98.4  F (36.9  C) 96.8  F (36  C) 97.4  F (36.3  C) 98.2  F (36.8  C)   TempSrc: Oral Oral Oral Oral   SpO2: 92% 98% 94% 95%   Weight:       Height:          Gen: comfortable, no acute distress  CV: warm, well-perfused  Lungs: appropriate work of breathing  Abd: soft, appropriately tender. Mildly distended and tympanic on percussion. Vertical midline incision with sterile dressing clean, dry, and intact.  : voiding spontaneously  Ext: warm and well perfused, SCDs in place, no edema.    Hemoglobin   Date Value Ref Range Status   07/02/2022 10.2 (L) 11.7 - 15.7 g/dL Final   06/29/2022 14.2 11.7 - 15.7 g/dL Final   12/01/2015 12.3 11.7 - 15.7 g/dL Final   03/13/2014 12.8 11.7 - 15.7 g/dL Final       A/P:Arabella Arrington is a 41 year old female POD #1 s/p abdominal supracervical hysterectomy, bilateral salpingectomy, left oophorectomy, cystoscopy, and pelvic washings.    FEN: Tolerating PO intake. Simethicone for gas pain.  Pain: Doing well overall on scheduled Tylenol, PRN oxycodone. Scheduled ibuprofen to add today.  : S/p gould, voiding sponatneously  Heme: Hgb 14.2 > EBL 200cc > AM Hgb 10.2. No signs or symptoms of anemia.    Dispo: Anticipate discharge to home today or tomorrow pending meeting postop goals.    Hemant Morse MD  OB/GYN PGY-1  07/03/2022 11:06 AM     Physician Attestation   I, Blaire Weiner MD, saw this patient with the resident and agree with  the resident/fellow's findings and plan of care as documented in the note.      I personally reviewed vital signs, medications and labs.    Key findings: patient is doing well, meeting postop goals.  Has not been up ambulating much yet.  Plan today as noted above.  Patient will shower, ambulate and we will add in ibuprofen to medication regimen. Patient may be ready for discharge later today if adequate pain control and ambulating independently.      Blaire Weiner MD  Date of Service (when I saw the patient): 07/03/22

## 2022-07-03 NOTE — PLAN OF CARE
Assist x1 with walker and gait belt;     Pain - incisional pain increases with movement and full bladder;  Scheduled tylenol given. PRN oxy for pain.  Pt states that the fullness and pain was still present at reassessment; Pt was given PRN robaxin and stated that was affective unpon reassessment. States pain is mainly when moving and from gas/pending bowel movement.     Patient given PRN simethicone for abdominal fullness and gas. No BM this shift. Audible bowel sounds x4;     Dressing intact and previously noted scant drainage marked - no changes from previous shift. Patient has abdominal binder in place;     Patient's medications are on unit for discharge;

## 2022-07-03 NOTE — OP NOTE
GYNECOLOGY OPERATIVE NOTE    Date of Service: 2022  Patient Name: Arabella Arrington  MRN: [id number]      Pre-operative diagnosis:  1. Abnormal uterine bleeding  2. Fibroid uterus w/ bulk symptoms  3. Suspected bilateral hydrosalpinx     Post-operative diagnosis:  1. Fibroid uterus  2. Endometriosis with very large endometrioma     Procedure:   Abdominal supracervical hysterectomy, bilateral salpingectomy, left oophorectomy, cystoscopy, pelvic washings     Surgeon: Kasandra Multani MD  Assistants:   - Roro Kay MD  - Steffany Griffin MD PGY4  - Hemant Morse MD PGY1  - Antonette Childers MS3    Anesthesia: General     EBL: 200 mL  Urine: 700 mL clear  Fluids: 1200 mL crystalloid     Specimens:     ID Type Source Tests Collected by Time Destination   1 : Pelvic washing Washings Pelvis NON-GYNECOLOGIC CYTOLOGY Kasandra Multani MD 2022  8:36 AM     2 : Uterus Tissue Uterus SURGICAL PATHOLOGY EXAM Kasandra Multani MD 2022 10:21 AM     3 : Ovary and Fallopian Tube, Left Tissue Ovary and Fallopian Tube, Left SURGICAL PATHOLOGY EXAM Kasandra Multani MD 2022 10:23 AM     4 : Fallopian Tube, Right Tissue Fallopian Tube, Right SURGICAL PATHOLOGY EXAM Kasandra Multani MD 2022 10:23 AM     5 : Uterine Fibroids Tissue Other SURGICAL PATHOLOGY EXAM Kasandra Multani MD 2022 10:28 AM       Complications: None     Indications: Arabella Arrington is a 41 year old  with known hx uterine fibroids who presented for abdominal distention, constipation, and progressively worsening heavy menstrual bleeding and dysmenorrhea. Imaging was notable for multiple large fibroids, largest pedunculated from fundus 8x12cm, large tubular bilateral adnexal masses, thought to be bilateral hydrosalpinx. Management options were reviewed, including hysterectomy, myomectomy and IR treatment with UAE. She decided to proceed with hysterectomy. Discussed risks, benefits, and alternatives to the procedure including  risk of infection, bleeding, injury to surrounding tissues. The patient's questions were answered, understanding confirmed, and the patient signed written informed consent.    Findings: Large fibroid uterus, normal bilateral fallopian tubes, normal-appearing R ovary. Approx 12cm endometrioma coming from L ovary, filling posterior cul-de-sac, w/ filmy adhesions to the uterus and rectum, adherent to R ovary. Cystoscopy showed normal appearing bladder with efflux from bilateral ureteral orifices.    Procedure details:  The consent was reviewed with the patient in the preoperative setting. She received prophylactic antibiotics. She was subsequently transferred to the operating room for the above-mentioned procedure. The patient was placed in dorsal lithotomy position. General anesthetic was obtained without noted difficulties. Chu catheter was placed under sterile techniques. The patient was then prepped and draped for an abdominal procedure. Timeout was called at which point the patient's name, operative procedure and site was confirmed by the operative team. A midline vertical skin incision was then made from the level of pubic symphysis and ultimately extending to the umbilicus. Incision was performed sharply and carried through the subcutaneous layer with cautery. Fascia was incised and extended superiorly and inferiorly. Peritoneum was then elevated and entered with sharply. Peritoneal incision was extended without any injury to nearby structures. At this point, exploration of the pelvis was performed and pelvic washings were collected. The bowels were packed away using laparotomy sponges. An Sandor retractor was placed and utilized throughout the course of the procedure.     The uterus and large fibroids were exteriorized. A large pedunculated fibroid originating at the fundus significantly obscured the view. The stalk of the fibroid was clamped with curved Ayla clamps, amputated with Miguel scissors and  suture-ligated with 0-vicryl. The round ligament on the left side was then isolated, suture-ligated with 0-vicryl and transected with cautery.The anterior leaf of the left aspect of the broad ligament was then incised along the bladder reflection toward the midline. The right round ligament was then isolated, suture ligated and transected in similar fashion.The right aspect of the round ligament was incised in similar fashion and the bladder flap was created using blunt dissection and electrocautery when necessary. To better mobilize the uterus, the right fallopian tube was transected at the R cornua and excises using Ligasure device. Bilateral uteroovarian ligments were identified, and transected using Ligasure device. At this time, there was noted to be dark brown fluid leaking from a defect in the large cyst originating from the L ovary. The defect was noted to be in the aspect of the cyst filling the posterior cul-de-sac. The cyst was aspirated using suction and the pelvis was copiously irrigated. The walls of the decompressed cyst were grasped with Allis clamps and carefully peeled away from the posterior aspect of the uterus using blunt dissection. The cyst was noted to be abutting the R ovary, which was normal appearing. Adhesions between the cyst and the R ovary were lysed using blunt dissection, Metzenbaum scissors and electrocautery. The cyst was the mobilized from the L ovarian fossa and the anterior aspect of the rectum using blunt and sharp dissection. The left infundibulopelvic ligament was then doubly clamped with curved Ayla clamp, transected with Miguel scissors and suture-ligated using 0-Vicryl. The cyst and left ovary were then removed.     The uterine arteries were then skeletonized bilaterally. The uterine arteries were then clamped, cut and suture ligated. Hemostasis was achieved. At this time, decision was made to proceed with a supra-cervical hysterectomy given significant adhesive disease,  particularly in the posterior cul-de-sac. The uterus was then amputated from the cervix using monopolar cautery. The endocervical tissue was cauterized with the Bovie and the cervical stump was oversewn  using 0-vicryl.     The rectum was inspected and there were several areas of superficial disruption of the rectal serosa where the ovary was dissected off the rectum which continued to bleed despite holding pressure so general surgery was consulted intra-operatively to obtain hemostasis. See separate documentation from general surgery for details.     The pelvis was then irrigated copiously with warmed saline. A cystoscopy was performed with the above findings. Chu was then replaced.    All laparotomy sponges were next removed. Fascia was closed with 0 looped PDS. Subcutaneous tissue irrigated. Subdermal layer re-approximated with 3-0 vicryl. 4-0 monocryl was used to close the skin. Steri-strips were applied over the skin incision, followed by an island dressing.     All instruments and needles were accounted for x2. Dr. Multani was present for the entire procedure. The patient tolerated the procedure well and transferred to the PACU in stable condition.    Steffany Griffin MD MSc  OBGYN Resident, PGY4    Ob/GYN Procedure Attestation     I was scrubbed and present for the entire procedure and agree with the resident documentation. Dr. Yu Kay was needed to assist with the procedure due to the significant pelvic adhesions from the large endometrioma. Dr. Kay assisted with retracting to obtain adequate visualization as well as performing some lysis of adhesions.     Kasandra Multani MD

## 2022-07-03 NOTE — PROGRESS NOTES
Pt is independent in the room.      Reports passing little more gas-- bowel sounds are active.    Surgical bandage was removed in the shower by pt this AM.  Incision looks good, sutures are CDI.     Pain has been rated from 0-6/10 depending on the level of activity.  Controlled with medications (see MAR), and ice packs.     Pt may potentially go home today based on assessment by doctor.

## 2022-07-03 NOTE — PLAN OF CARE
Pt is looking forwasrd to possibly going home today. Pt has yet to be seen by [facility], but the writer received a called saying they are planning to see the pt around 1645 today.    Pt. discharged at 1615 to home, and left with personal belongings. Pt. received complete discharge paperwork and pain medications as filled by discharge pharmacy. Pt received and signed for the narcotic medication. Pt. was given times of last dose for all discharge medications in writing on discharge medication sheets. Discharge teaching included pain medication, pain management, activity restrictions, and signs and symptoms of infection. Pt. to follow up with PCP in 2 weeks. Pt. had no further questions at the time of discharge and no unmet needs were identified.

## 2022-07-05 ENCOUNTER — TELEPHONE (OUTPATIENT)
Dept: FAMILY MEDICINE | Facility: CLINIC | Age: 42
End: 2022-07-05

## 2022-07-06 NOTE — TELEPHONE ENCOUNTER
"Hospital/TCU/ED for chronic condition Discharge Protocol    \"Hi, my name is  Karen Echols RN, a registered nurse, and I am calling from [facility].  I am calling to follow up and see how things are going for you after your recent emergency visit/hospital/TCU stay.\"    Tell me how you are doing now that you are home?\" doing well. Recuperating.      Discharge Instructions    \"Let's review your discharge instructions.  What is/are the follow-up recommendations?  Pt. Response: Has  Follow up with surgeon in 2 and 6 weeks.    \"Has an appointment with your primary care provider been scheduled?\"   Yes. (confirm)    \"When you see the provider, I would recommend that you bring your medications with you.\"    Medications    \"Tell me what changed about your medicines when you discharged?\"    Changes to chronic meds?    0-1    \"What questions do you have about your medications?\"    None     New diagnoses of heart failure, COPD, diabetes, or MI?    No              Post Discharge Medication Reconciliation Status: discharge medications reconciled, continue medications without change.    Was MTM referral placed (*Make sure to put transitions as reason for referral)?   No    Call Summary    \"What questions or concerns do you have about your recent visit and your follow-up care?\"     Advice given- Pain is handled with Tyl and Ibuprofen. BM yesterday. Rec moving and walking as tolerated. No current issues for PCP.    \"If you have questions or things don't continue to improve, we encourage you contact us through the main clinic number (give number).  Even if the clinic is not open, triage nurses are available 24/7 to help you.     We would like you to know that our clinic has extended hours (provide information).  We also have urgent care (provide details on closest location and hours/contact info)\"      \"Thank you for your time and take care!\"  ROLA Jones             "

## 2022-07-18 ENCOUNTER — OFFICE VISIT (OUTPATIENT)
Dept: OBGYN | Facility: CLINIC | Age: 42
End: 2022-07-18
Payer: COMMERCIAL

## 2022-07-18 VITALS
SYSTOLIC BLOOD PRESSURE: 117 MMHG | WEIGHT: 182.6 LBS | BODY MASS INDEX: 31.18 KG/M2 | HEART RATE: 95 BPM | HEIGHT: 64 IN | OXYGEN SATURATION: 100 % | DIASTOLIC BLOOD PRESSURE: 82 MMHG

## 2022-07-18 DIAGNOSIS — Z90.710 S/P HYSTERECTOMY: Primary | ICD-10-CM

## 2022-07-18 PROCEDURE — 99024 POSTOP FOLLOW-UP VISIT: CPT | Performed by: OBSTETRICS & GYNECOLOGY

## 2022-07-18 NOTE — PROGRESS NOTES
"GYN Post-op Progress Note       CC: Arabella Arrington is a 41 year old  who is s/p abdominal supracervical hysterectomy, bilateral salpingectomy and left oophorectomy on 2022 who presents for a post-op visit         HPI: Since her procedure Arabella Arrington reports that she has been feeling well. She is ambulating and voiding without difficulty. She denies any dysuria or urinary frequency and is able to fully empty her bladder. She denies nausea or vomiting and is tolerating a general diet. Incisions healing well, no concerns.         Physical exam:    Vitals:    22 0848   BP: 117/82   Pulse: 95   SpO2: 100%   Weight: 82.8 kg (182 lb 9.6 oz)   Height: 1.613 m (5' 3.5\")       Gen: no acute distress  Abd: soft, non-tender, incision healing well   Pelvic: deferred  Ext: non-tender, no edema     Pathology   A. Uterus, supracervical hysterectomy:  -Benign endometrial polyp on a background of inactive-type endometrium, negative for hyperplasia or atypia  -Leiomyomas  -Uterine serosal endometriosis and adhesions     B. Left ovary and fallopian tube, salpingo-oophorectomy:  -Ovary with a benign endometriotic cyst  -Fallopian tube with endometriosis     C. Right fallopian tube, salpingectomy:  -Fallopian tube with endometriosis     D. Uterus, myomectomy:  -Leiomyoma       Assessment and Plan    Arabella Arrington is a 41 year old  who is s/p  abdominal supracervical hysterectomy, bilateral salpingectomy and left oophorectomy and presents for a 2 week post-op visit, progressing well  -Ok to slowly increase physical activity but should still remain lifting restrictions until 6 weeks post-op      Dispo: RTC in 4 weeks or sooner PRN  Kasandra Multani MD      "

## 2022-08-12 ENCOUNTER — OFFICE VISIT (OUTPATIENT)
Dept: OBGYN | Facility: CLINIC | Age: 42
End: 2022-08-12
Payer: COMMERCIAL

## 2022-08-12 VITALS
DIASTOLIC BLOOD PRESSURE: 81 MMHG | HEART RATE: 82 BPM | TEMPERATURE: 97.5 F | WEIGHT: 185 LBS | BODY MASS INDEX: 32.26 KG/M2 | SYSTOLIC BLOOD PRESSURE: 125 MMHG

## 2022-08-12 DIAGNOSIS — Z90.710 S/P HYSTERECTOMY: Primary | ICD-10-CM

## 2022-08-12 PROCEDURE — 99024 POSTOP FOLLOW-UP VISIT: CPT | Performed by: OBSTETRICS & GYNECOLOGY

## 2022-08-12 NOTE — PROGRESS NOTES
GYN Post-op Progress Note       CC: Arabella Arrington is a 41 year old  who is s/p abdominal supracervical hysterectomy, bilateral salpingectomy and left oophorectomy and presents for a post-op visit         HPI: Since her procedure Arabella Arrington reports that she has been feeling well. She is ambulating and voiding without difficulty. She denies any dysuria or urinary frequency and is able to fully empty her bladder. She denies nausea or vomiting and is tolerating a general diet. Incision healing well, she is still having some pain with activity but is starting to get back to a more normal routine. No vaginal bleeding.          Physical exam:    Vitals:    22 1352   BP: 125/81   Pulse: 82   Temp: 97.5  F (36.4  C)   Weight: 83.9 kg (185 lb)       Gen: no acute distress  Abd: soft, non-tender, vertical midline skin incision well healed  Pelvic: cervix and vaginal normal in appearence, mildly tender at top of vagina/cervix.   Ext: non-tender, no edema    Pathology report     A. Uterus, supracervical hysterectomy:  -Benign endometrial polyp on a background of inactive-type endometrium, negative for hyperplasia or atypia  -Leiomyomas  -Uterine serosal endometriosis and adhesions     B. Left ovary and fallopian tube, salpingo-oophorectomy:  -Ovary with a benign endometriotic cyst  -Fallopian tube with endometriosis     C. Right fallopian tube, salpingectomy:  -Fallopian tube with endometriosis     D. Uterus, myomectomy:  -Leiomyoma       Assessment and Plan    Arabella Arrington is a 41 year old  who is s/p supracervical hyst, bilateral salpingectomy and left oophorectomy and presents for a post-op visit, progressing well  -We reviewed her surgery and discussed that she will still need pap smears q 5 years due to the supracervical hyst.   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     Dispo: RTC as needed     Kasandra Multani MD

## 2022-09-10 ENCOUNTER — HEALTH MAINTENANCE LETTER (OUTPATIENT)
Age: 42
End: 2022-09-10

## 2022-10-06 ENCOUNTER — OFFICE VISIT (OUTPATIENT)
Dept: FAMILY MEDICINE | Facility: CLINIC | Age: 42
End: 2022-10-06
Payer: COMMERCIAL

## 2022-10-06 VITALS
BODY MASS INDEX: 32.91 KG/M2 | WEIGHT: 192.8 LBS | HEART RATE: 70 BPM | TEMPERATURE: 97.4 F | OXYGEN SATURATION: 96 % | HEIGHT: 64 IN | RESPIRATION RATE: 22 BRPM | SYSTOLIC BLOOD PRESSURE: 129 MMHG | DIASTOLIC BLOOD PRESSURE: 86 MMHG

## 2022-10-06 DIAGNOSIS — N80.9 ENDOMETRIOSIS: ICD-10-CM

## 2022-10-06 DIAGNOSIS — Z23 NEED FOR PROPHYLACTIC VACCINATION AND INOCULATION AGAINST INFLUENZA: ICD-10-CM

## 2022-10-06 DIAGNOSIS — Z83.2 FAMILY HISTORY OF FACTOR V LEIDEN MUTATION: ICD-10-CM

## 2022-10-06 DIAGNOSIS — Z23 NEED FOR COVID-19 VACCINE: ICD-10-CM

## 2022-10-06 DIAGNOSIS — R74.01 ELEVATED ALT MEASUREMENT: ICD-10-CM

## 2022-10-06 DIAGNOSIS — R73.03 PREDIABETES: ICD-10-CM

## 2022-10-06 DIAGNOSIS — D50.0 IRON DEFICIENCY ANEMIA DUE TO CHRONIC BLOOD LOSS: Primary | ICD-10-CM

## 2022-10-06 DIAGNOSIS — Z83.719 FAMILY HISTORY OF COLONIC POLYPS: ICD-10-CM

## 2022-10-06 DIAGNOSIS — Z90.711 H/O ABDOMINAL SUPRACERVICAL SUBTOTAL HYSTERECTOMY: ICD-10-CM

## 2022-10-06 DIAGNOSIS — Z80.0 FAMILY HISTORY OF ESOPHAGEAL CANCER: ICD-10-CM

## 2022-10-06 PROBLEM — N92.0 MENORRHAGIA WITH REGULAR CYCLE: Status: RESOLVED | Noted: 2022-01-17 | Resolved: 2022-10-06

## 2022-10-06 LAB
ALBUMIN SERPL-MCNC: 3.8 G/DL (ref 3.4–5)
ALP SERPL-CCNC: 86 U/L (ref 40–150)
ALT SERPL W P-5'-P-CCNC: 161 U/L (ref 0–50)
ANION GAP SERPL CALCULATED.3IONS-SCNC: 5 MMOL/L (ref 3–14)
AST SERPL W P-5'-P-CCNC: 98 U/L (ref 0–45)
BASOPHILS # BLD AUTO: 0 10E3/UL (ref 0–0.2)
BASOPHILS NFR BLD AUTO: 1 %
BILIRUB DIRECT SERPL-MCNC: 0.1 MG/DL (ref 0–0.2)
BILIRUB SERPL-MCNC: 0.5 MG/DL (ref 0.2–1.3)
BUN SERPL-MCNC: 8 MG/DL (ref 7–30)
CALCIUM SERPL-MCNC: 9.3 MG/DL (ref 8.5–10.1)
CHLORIDE BLD-SCNC: 106 MMOL/L (ref 94–109)
CO2 SERPL-SCNC: 25 MMOL/L (ref 20–32)
CREAT SERPL-MCNC: 0.66 MG/DL (ref 0.52–1.04)
EOSINOPHIL # BLD AUTO: 0.3 10E3/UL (ref 0–0.7)
EOSINOPHIL NFR BLD AUTO: 4 %
ERYTHROCYTE [DISTWIDTH] IN BLOOD BY AUTOMATED COUNT: 13.6 % (ref 10–15)
FERRITIN SERPL-MCNC: 72 NG/ML (ref 12–150)
GFR SERPL CREATININE-BSD FRML MDRD: >90 ML/MIN/1.73M2
GLUCOSE BLD-MCNC: 106 MG/DL (ref 70–99)
HBA1C MFR BLD: 5.4 % (ref 0–5.6)
HCT VFR BLD AUTO: 39.1 % (ref 35–47)
HGB BLD-MCNC: 13 G/DL (ref 11.7–15.7)
IMM GRANULOCYTES # BLD: 0 10E3/UL
IMM GRANULOCYTES NFR BLD: 0 %
IRON SATN MFR SERPL: 15 % (ref 15–46)
IRON SERPL-MCNC: 58 UG/DL (ref 35–180)
LYMPHOCYTES # BLD AUTO: 2.2 10E3/UL (ref 0.8–5.3)
LYMPHOCYTES NFR BLD AUTO: 31 %
MCH RBC QN AUTO: 28.3 PG (ref 26.5–33)
MCHC RBC AUTO-ENTMCNC: 33.2 G/DL (ref 31.5–36.5)
MCV RBC AUTO: 85 FL (ref 78–100)
MONOCYTES # BLD AUTO: 0.5 10E3/UL (ref 0–1.3)
MONOCYTES NFR BLD AUTO: 7 %
NEUTROPHILS # BLD AUTO: 4.2 10E3/UL (ref 1.6–8.3)
NEUTROPHILS NFR BLD AUTO: 58 %
PLATELET # BLD AUTO: 282 10E3/UL (ref 150–450)
POTASSIUM BLD-SCNC: 3.9 MMOL/L (ref 3.4–5.3)
PROT SERPL-MCNC: 7.7 G/DL (ref 6.8–8.8)
RBC # BLD AUTO: 4.6 10E6/UL (ref 3.8–5.2)
SODIUM SERPL-SCNC: 136 MMOL/L (ref 133–144)
TIBC SERPL-MCNC: 385 UG/DL (ref 240–430)
WBC # BLD AUTO: 7.2 10E3/UL (ref 4–11)

## 2022-10-06 PROCEDURE — 83036 HEMOGLOBIN GLYCOSYLATED A1C: CPT | Performed by: FAMILY MEDICINE

## 2022-10-06 PROCEDURE — 85025 COMPLETE CBC W/AUTO DIFF WBC: CPT | Performed by: FAMILY MEDICINE

## 2022-10-06 PROCEDURE — 83550 IRON BINDING TEST: CPT | Performed by: FAMILY MEDICINE

## 2022-10-06 PROCEDURE — 90686 IIV4 VACC NO PRSV 0.5 ML IM: CPT | Performed by: FAMILY MEDICINE

## 2022-10-06 PROCEDURE — 91313 COVID-19,PF,MODERNA BIVALENT: CPT | Performed by: FAMILY MEDICINE

## 2022-10-06 PROCEDURE — 99215 OFFICE O/P EST HI 40 MIN: CPT | Mod: 25 | Performed by: FAMILY MEDICINE

## 2022-10-06 PROCEDURE — 80053 COMPREHEN METABOLIC PANEL: CPT | Performed by: FAMILY MEDICINE

## 2022-10-06 PROCEDURE — 36415 COLL VENOUS BLD VENIPUNCTURE: CPT | Performed by: FAMILY MEDICINE

## 2022-10-06 PROCEDURE — 90471 IMMUNIZATION ADMIN: CPT | Performed by: FAMILY MEDICINE

## 2022-10-06 PROCEDURE — 82728 ASSAY OF FERRITIN: CPT | Performed by: FAMILY MEDICINE

## 2022-10-06 PROCEDURE — 82248 BILIRUBIN DIRECT: CPT | Performed by: FAMILY MEDICINE

## 2022-10-06 PROCEDURE — 0134A COVID-19,PF,MODERNA BIVALENT: CPT | Performed by: FAMILY MEDICINE

## 2022-10-06 NOTE — RESULT ENCOUNTER NOTE
Hello Ms. Arrington,  Some of your results came back and are within acceptable limits. -Normal red blood cell (hgb) levels, normal white blood cell count and normal platelet levels.  -A1C (diabetic test) is normal and indicates that your blood sugar has been in a normal range the last 3 months.. If you have any further concerns please do not hesitate to contact us by message, phone or making an appointment.  Have a good day   Dr Hitesh MARIE

## 2022-10-06 NOTE — PROGRESS NOTES
Assessment & Plan     Iron deficiency anemia due to chronic blood loss  Iron def was on iron till had hysterectomy in done in July. Off the iron post op and here for recheck, feeling well. Check labs today     Hx of abdominal supracervical subtotal hysterectomy on 7/1/22, bilateral salpingectomy, left oophorectomy, cystoscopy, pelvic washings. Pathology showed Uterus, supracervical hysterectomy: Benign endometrial polyp on a background of inactive-type endometrium, negative for hyperplasia or atypia, Leiomyomas, Uterine serosal endometriosis and adhesions. Left ovary and fallopian tube, salpingo-oophorectomy: Ovary with a benign endometriotic cyst & Fallopian tube with endometriosis, Right fallopian tube, salpingectomy: Fallopian tube with endometriosis. Uterus, myomectomy: -Leiomyoma. Pelvic washings were normal. Post op Hb was 10. Seen by gyn 7/18/22 for 2 week post op check, and 8/17/22 for 6 week post op check and noted was doing well. Advised pap every 5 yrs,as cervix still in place,  due 2027 and to see gyn if should have pain given the hx of endometriosis.     BMI 33, weight up since last visit as not been able to exercise much due to surgery till now. Working on a couch to [employer] program with sister.    Hx of prediabetes, will recheck today    Elevated ALT, will recheck, if elevated consider u/s liver    FH colonic polyps, had colonoscopy in 2022 was normal advised recheck in 2025     FH of esophageal cancer, FH of factor 5, Opts no genetic testing for now     Potential exposure to Covid notified on phone but tested negative this am & asymptomatic so far. partner had Covid recent ly and tested positive till 2 weeks ago  Ok to get flu and Covid vaccine  See back end of jan / feb for a routine physical   - CBC with platelets and differential; Future  - Iron and iron binding capacity; Future  - Ferritin; Future  - CBC with platelets and differential  - Iron and iron binding capacity  - Ferritin    H/O abdominal  supracervical subtotal hysterectomy  Hx of abdominal supracervical subtotal hysterectomy on 7/1/22, bilateral salpingectomy, left oophorectomy, cystoscopy, pelvic washings. Pathology showed Uterus, supracervical hysterectomy: Benign endometrial polyp on a background of inactive-type endometrium, negative for hyperplasia or atypia, Leiomyomas, Uterine serosal endometriosis and adhesions. Left ovary and fallopian tube, salpingo-oophorectomy: Ovary with a benign endometriotic cyst & Fallopian tube with endometriosis, Right fallopian tube, salpingectomy: Fallopian tube with endometriosis. Uterus, myomectomy: -Leiomyoma. Pelvic washings were normal. Post op Hb was 10. Seen by gyn 7/18/22 for 2 week post op check, and 8/17/22 for 6 week post op check and noted was doing well. Advised pap every 5 yrs,as cervix still in place,  due 2027 and to see gyn if should have pain given the hx of endometriosis.   - CBC with platelets and differential; Future  - CBC with platelets and differential    Endometriosis  see gyn if should have pain given the hx of endometriosis  - CBC with platelets and differential; Future  - CBC with platelets and differential    BMI 33.0-33.9,adult  BMI 33, weight up since last visit as not been able to exercise much due to surgery till now. Working on a couch to [employer] program with sister.  - Basic metabolic panel  (Ca, Cl, CO2, Creat, Gluc, K, Na, BUN); Future  - PRIMARY CARE FOLLOW-UP SCHEDULING; Future  - Basic metabolic panel  (Ca, Cl, CO2, Creat, Gluc, K, Na, BUN)    Prediabetes  Hx of prediabetes, will recheck today  - Basic metabolic panel  (Ca, Cl, CO2, Creat, Gluc, K, Na, BUN); Future  - Hemoglobin A1c; Future  - PRIMARY CARE FOLLOW-UP SCHEDULING; Future  - Basic metabolic panel  (Ca, Cl, CO2, Creat, Gluc, K, Na, BUN)  - Hemoglobin A1c    Elevated ALT measurement  Elevated ALT, will recheck, if elevated consider u/s liver  - Hepatic panel (Albumin, ALT, AST, Bili, Alk Phos, TP); Future  - PRIMARY  "CARE FOLLOW-UP SCHEDULING; Future  - Hepatic panel (Albumin, ALT, AST, Bili, Alk Phos, TP)    Family history of colonic polyps  FH colonic polyps, had colonoscopy in 2022 was normal advised recheck in 2025   - PRIMARY CARE FOLLOW-UP SCHEDULING; Future    Family history of esophageal cancer  Family history of factor V Leiden mutation  FH of esophageal cancer, FH of factor 5, Opts no genetic testing for now     Need for prophylactic vaccination and inoculation against influenza  Potential exposure to Covid notified on phone but tested negative this am & asymptomatic so far. partner had Covid recent ly and tested positive till 2 weeks ago  Ok to get flu and Covid vaccine. Flu shot given today  - INFLUENZA VACCINE IM > 6 MONTHS VALENT IIV4 (AFLURIA/FLUZONE)    Need for COVID-19 vaccine  Covid bivalent given  - COVID-19,PF,MODERNA BIVALENT 18+Yrs    Ordering of each unique test  47 minutes spent on the date of the encounter doing chart review, history and exam, documentation and further activities per the note     BMI:   Estimated body mass index is 33.32 kg/m  as calculated from the following:    Height as of this encounter: 1.62 m (5' 3.78\").    Weight as of this encounter: 87.5 kg (192 lb 12.8 oz).   Weight management plan: Discussed healthy diet and exercise guidelines    Work on weight loss  Regular exercise  See Patient Instructions    Return in about 4 months (around 2/6/2023) for Routine preventive, with me, in person.   Follow-up Visit   Expected date:  Feb 06, 2023 (Approximate)      Follow Up Appointment Details:     Follow-up with whom?: Me    Follow-Up for what?: Adult Preventive    How?: In Person                    Kerrie Proctor MD  [facility]    Lorena Bell is a 41 year old, presenting for the following health issues:  RECHECK (Labs/)      History of Present Illness       Reason for visit:  Follow up appt. requested by the .    She eats 2-3 servings of fruits and " vegetables daily.She consumes 0 sweetened beverage(s) daily.She exercises with enough effort to increase her heart rate 20 to 29 minutes per day.  She exercises with enough effort to increase her heart rate 3 or less days per week.   She is taking medications regularly.     BACKGROUND   41-year-old lady, , former smoker, BMI more than 32, history of prediabetes with hemoglobin A1c of 5.7,  with history of  intramural leiomyomas and left ovarian cyst, & AUB,  prior menorrhagia with regular cycles, family history of factor V Leiden mutation but personally tested negative, family history of dad with a colectomy history due to polyps and/serrated adenoma, colonoscopy done in  in self was normal, family history of esophageal cancer currently opting to defer  consult, history of iron deficiency anemia due to chronic blood loss, ,  previously under care of Barbara Ha, seen by GYN  in 2018 for fibroids following episode of acute pain that made her seek ER care & ct scan ordered. Was told fibroids were unlikely cause of the acute severe pain at that time. Options discussed and was to think about it and get back to gynecology at that time.  [place]  negative.  Seen first time by this provider on 22 for preventive health and additional concerns. Discussed self breast check regularly. To consider referral to  given fh of esophageal cancer , colon polyps, factor 5 if insurance would cover. Mammogram due and referral placed. Was to return for a Pelvic / PAP when not on her period. Healthcare maintenance reviewed. To consider working on healthcare directives, & honoring choices given. Noted COVID-vaccine including booster and flu shot up-to-date. Labs done.   BMI 32:  Encouraged a healthy diet, frequent small meals, less carbohydrates more Mediterranean-style.  To try to lose at least 10% of total body weight over time to be healthier.  After the visit hemoglobin A1c came back elevated at  5.7 suggesting prediabetes, recommended rechecking in 6 months to 1 year  Due to prior left ovarian cyst, fibroids of uterus and heavy periods, a repeat pelvic ultrasound was ordered. After the visit hemoglobin came back showing anemia with hemoglobin 9.8 and MCH MCV suggestive of iron deficiency, iron and ferritin came back low with ferritin of 9 & iron of 16, suspected from heavy periods.  Was asymptomatic.  Recommended to increase iron in diet, consider taking over-the-counter iron daily and recheck in 3 months with CBC, iron, ferritin, B12, reticulocyte & peripheral smear.  For mole on nose and on right leg and multiple pigmented nevi on skin of face, neck, back, chest, abdomen & extremities,  referred to dermatology for skin check and mole removal. Discussed family history of esophageal cancer and colonic polyps.  Advised to Increase fiber in diet and referred to GI for screening colonoscopy.  Was to consider a  consultation. Family history of factor V Leiden mutation  & testing done after genetic form signed as could help make recommendations in the future with regard to heavy periods and hormonal treatment.  If positive for clotting disorder would avoid estrogen containing compounds.  Labs came back showing a mildly elevated LDL with ASCVD risk of 2%, CMP normal TSH normal ferritin 9 iron 16, hemoglobin 9.8, AST minimally elevated, glucose minimally elevated, hemoglobin A1c 5.7. Mammogram done 1/28 was normal. Pelvic ultrasound on 2/8/2022 showed fibroid filled uterus ovaries not visible and referred to gynecology. Seen by gynecology virtually on 2/11/2022 and advised an MRI scheduled for 12 March and an EMB scheduled for the 22nd and was considering a hysterectomy given had no plans to start a family.   Colonoscopy done 2/18/2022 was normal recommended recheck in 10 years and advised to find out more information on dad's reason for colectomy given history of polyps to see if genetic testing could  be done and also to check for celiac given iron deficiency anemia even if probably due to heavy menses.       Seen 3/7/22 for a pap.  Was to continue iron supplement daily and recheck iron labs & for celiac in 3 months.  Continued on psyllium to prevent constipation from iron. Iron deficiency anemia suspected due to chronic loss due to heavy periods, suspected due to large fibroids. History of ovarian cyst but unable to see ovaries on most recent ultrasound due to fibroid distortion. Family history of factor V Leiden with a recent factor V testing was negative. Discussed Family history of dad having had a colectomy for many serrated adenoma polyps, sister also with history of colonic polyps.  Recent colonoscopy on 2/18/2022 was normal and recommended recheck in 10 years.  GI did recommend checking for celiac and this was to be added to future labs. Given family history though would recommend  consult to assess risk of a genetic polyposis condition.  If unable to do genetic testing to consider colonoscopy every 5 years instead to be safe. Discussed genetic testing may be helpful also given family history of esophageal cancer. Was to continue omega for mildly elevated LDL.  Overall cardiovascular risk was low. Given history of prediabetes and BMI, to continue with fiber intake and eating healthy and losing weight to lower cardiovascular & cancer risk. Discussed could refer to hematology if hemoglobin did not go adequately up post surgery.   Seen by gyn 3/22 and options discussed and planning n MATTHEW, B/l salpingectomy and cystoscopy but to leave ovaries in place unless something found intraoperatively. Planned for iv iron at time of surgery.   On 4/15/21 cbc noted improved to hb of 13.4, celiac testing was negative. Peripheral smear was normal, iron improved form 16 to 36, ferritin improved to 38 & vit B 12 was 562 , wnl.   Seen by derm 4/28/22 and had a shave biopsy of nose lesion and noted other benign  lesions.    Seen 6/3/22 for preop for hysterectomy.  CMP showed elevated ALT.  Glucose is elevated.  Hemoglobin A1c in normal range.  Had a normal CBC and cleared for procedure.    On 7/1/22 underwent Abdominal supracervical hysterectomy, bilateral salpingectomy, left oophorectomy, cystoscopy, pelvic washings  For abnormal uterine bleeding, Fibroid uterus w/ bulk symptoms & suspected bilateral hydrosalpinx. Post-operative diagnosis was Fibroid uterus  & Endometriosis with very large endometrioma. Pathology showed Uterus, supracervical hysterectomy: Benign endometrial polyp on a background of inactive-type endometrium, negative for hyperplasia or atypia, Leiomyomas, Uterine serosal endometriosis and adhesions. Left ovary and fallopian tube, salpingo-oophorectomy: Ovary with a benign endometriotic cyst & Fallopian tube with endometriosis, Right fallopian tube, salpingectomy: Fallopian tube with endometriosis. Uterus, myomectomy: -Leiomyoma. Pelvic washings were normal. Post op Hb was 10. Seen by gyn 7/18/22 for 2 week post op check, and 8/17/22 for 6 week post op check and noted was doing well.     CURRENTLY   Here for follow up, has been feeling well  No fever or chills, no headache or dizziness, no double or blurry vision, no facial pain, earache, sore throat, runny nose, post nasal drip, no trouble hearing, smelling, tasting or swallowing, no cough , no chest pain, trouble breathing or palpitations, No abdominal pain, heart burn, reflux, nausea or vomiting or diarrhea or constipation, no blood in stools or black stools, no weight loss or night sweats. No dysuria, hematuria, frequency, urgency, hesitancy, incontinence, No pelvic complaints. No leg swellingor joint pain. No rash.  Iron def was on iron till had hysterectomy in done in July. Off the iron post op and here for recheck  Hx of abdominal supracervical subtotal hysterectomy. bilateral salpingectomy, left oophorectomy, cystoscopy, pelvic washings. Pathology  "showed Uterus, supracervical hysterectomy: Benign endometrial polyp on a background of inactive-type endometrium, negative for hyperplasia or atypia, Leiomyomas, Uterine serosal endometriosis and adhesions. Left ovary and fallopian tube, salpingo-oophorectomy: Ovary with a benign endometriotic cyst & Fallopian tube with endometriosis, Right fallopian tube, salpingectomy: Fallopian tube with endometriosis. Uterus, myomectomy: -Leiomyoma. Pelvic washings were normal. Post op Hb was 10. Seen by gyn 7/18/22 for 2 week post op check, and 8/17/22 for 6 week post op check and noted was doing well. Advised pap every 5 yrs, due 2027 and to see gyn if should have pain given the hx of endometriosis/   BMI 33, weight up since last visit as not been able to exercise much due to surgery till now. Working on a couch to [employer] program with sister.  Hx of prediabetes, will recheck today  Elevated ALT, will recheck  FH colonic polyps, had colonoscopy in 2022 was normal advised recheck in 2025   FH of esophageal cancer, FH of factor 5, Opts no genetic testing for now   Ok to get flu and Covid vaccine  Potential exposure to Covid notified on phone but tested negative this am & asymptomatic so far. partner had Covid recent ly and tested positive till 2 weeks ago   Review of Systems   Constitutional, HEENT, cardiovascular, pulmonary, GI, , musculoskeletal, neuro, skin, endocrine and psych systems are negative, except as otherwise noted.      Objective    /86 (BP Location: Right arm, Patient Position: Sitting, Cuff Size: Adult Regular)   Pulse 70   Temp 97.4  F (36.3  C) (Temporal)   Resp 22   Ht 1.62 m (5' 3.78\")   Wt 87.5 kg (192 lb 12.8 oz)   LMP 03/12/2022 (Exact Date)   SpO2 96%   BMI 33.32 kg/m    Body mass index is 33.32 kg/m .  Physical Exam   GENERAL: healthy, alert, no distress and obese  EYES: Eyes grossly normal to inspection, PERRL and conjunctivae and sclerae normal  HENT: ear canals and TM's normal, nose and " mouth without ulcers or lesions  NECK: no adenopathy, no asymmetry, masses, or scars and thyroid normal to palpation  RESP: lungs clear to auscultation - no rales, rhonchi or wheezes  CV: regular rate and rhythm, normal S1 S2, no S3 or S4, no murmur, click or rub, no peripheral edema and peripheral pulses strong  ABDOMEN: soft, nontender, no hepatosplenomegaly, no masses and bowel sounds normal, midline below umbilical scar from recent hysterectomy  MS: no gross musculoskeletal defects noted, no edema  SKIN: no suspicious lesions or rashes  NEURO: Normal strength and tone, mentation intact and speech normal  PSYCH: mentation appears normal, affect normal/bright    Results for orders placed or performed in visit on 10/06/22   Hemoglobin A1c     Status: Normal   Result Value Ref Range    Hemoglobin A1C 5.4 0.0 - 5.6 %   CBC with platelets and differential     Status: None   Result Value Ref Range    WBC Count 7.2 4.0 - 11.0 10e3/uL    RBC Count 4.60 3.80 - 5.20 10e6/uL    Hemoglobin 13.0 11.7 - 15.7 g/dL    Hematocrit 39.1 35.0 - 47.0 %    MCV 85 78 - 100 fL    MCH 28.3 26.5 - 33.0 pg    MCHC 33.2 31.5 - 36.5 g/dL    RDW 13.6 10.0 - 15.0 %    Platelet Count 282 150 - 450 10e3/uL    % Neutrophils 58 %    % Lymphocytes 31 %    % Monocytes 7 %    % Eosinophils 4 %    % Basophils 1 %    % Immature Granulocytes 0 %    Absolute Neutrophils 4.2 1.6 - 8.3 10e3/uL    Absolute Lymphocytes 2.2 0.8 - 5.3 10e3/uL    Absolute Monocytes 0.5 0.0 - 1.3 10e3/uL    Absolute Eosinophils 0.3 0.0 - 0.7 10e3/uL    Absolute Basophils 0.0 0.0 - 0.2 10e3/uL    Absolute Immature Granulocytes 0.0 <=0.4 10e3/uL   CBC with platelets and differential     Status: None    Narrative    The following orders were created for panel order CBC with platelets and differential.  Procedure                               Abnormality         Status                     ---------                               -----------         ------                     CBC with  platelets and d...[[id number]]                      Final result                 Please view results for these tests on the individual orders.     Results for orders placed or performed in visit on 10/06/22 (from the past 24 hour(s))   CBC with platelets and differential    Narrative    The following orders were created for panel order CBC with platelets and differential.  Procedure                               Abnormality         Status                     ---------                               -----------         ------                     CBC with platelets and d...[[id number]]                      Final result                 Please view results for these tests on the individual orders.   Hemoglobin A1c   Result Value Ref Range    Hemoglobin A1C 5.4 0.0 - 5.6 %   CBC with platelets and differential   Result Value Ref Range    WBC Count 7.2 4.0 - 11.0 10e3/uL    RBC Count 4.60 3.80 - 5.20 10e6/uL    Hemoglobin 13.0 11.7 - 15.7 g/dL    Hematocrit 39.1 35.0 - 47.0 %    MCV 85 78 - 100 fL    MCH 28.3 26.5 - 33.0 pg    MCHC 33.2 31.5 - 36.5 g/dL    RDW 13.6 10.0 - 15.0 %    Platelet Count 282 150 - 450 10e3/uL    % Neutrophils 58 %    % Lymphocytes 31 %    % Monocytes 7 %    % Eosinophils 4 %    % Basophils 1 %    % Immature Granulocytes 0 %    Absolute Neutrophils 4.2 1.6 - 8.3 10e3/uL    Absolute Lymphocytes 2.2 0.8 - 5.3 10e3/uL    Absolute Monocytes 0.5 0.0 - 1.3 10e3/uL    Absolute Eosinophils 0.3 0.0 - 0.7 10e3/uL    Absolute Basophils 0.0 0.0 - 0.2 10e3/uL    Absolute Immature Granulocytes 0.0 <=0.4 10e3/uL

## 2022-10-06 NOTE — RESULT ENCOUNTER NOTE
Hello Ms. Arrington,  Some of your results came back showing -Kidney function (GFR) is normal.  -Sodium is normal.  -Potassium is normal.  -Calcium is normal.  -Glucose is mildly elevated but you were nonfasting and this is okay..  -Liver and gallbladder tests (ALT,AST, Alk phos,bilirubin) the AST and ALT are elevated compared to before recommend an ultrasound to evaluate elevated liver tests.  I will put the order in and you can schedule at your convenience.  Based on that we will make further recommendations.  -Ferritin (iron) level is now normal.. If you have any further concerns please do not hesitate to contact us by message, phone or making an appointment.  Have a good day   Dr Hitesh MARIE

## 2022-10-06 NOTE — RESULT ENCOUNTER NOTE
Sera Muller Murphy,  Your results came back with improved normal iron   If you have any further concerns please do not hesitate to contact us by message, phone or making an appointment.  Have a good day   Dr Hitesh MARIE

## 2022-10-06 NOTE — PATIENT INSTRUCTIONS
Check labs today   Consider    Colonoscopy 2025  Pap due 2027 as cervix still in place  If should have pain contact gyn given hx of endometrioma removed  Consider  referral given fh of colon and esophageal cancer  Flu shot today   Covid moderna booster today  ( has had primary covid pfizer vaccine series)   See you back end of jan / feb for a routine physical

## 2022-10-21 ENCOUNTER — HOSPITAL ENCOUNTER (OUTPATIENT)
Dept: ULTRASOUND IMAGING | Facility: HOSPITAL | Age: 42
Discharge: HOME | End: 2022-10-21
Attending: FAMILY MEDICINE | Admitting: FAMILY MEDICINE
Payer: COMMERCIAL

## 2022-10-21 ENCOUNTER — TELEPHONE (OUTPATIENT)
Dept: FAMILY MEDICINE | Facility: CLINIC | Age: 42
End: 2022-10-21

## 2022-10-21 DIAGNOSIS — K76.0 HEPATIC STEATOSIS: Primary | ICD-10-CM

## 2022-10-21 DIAGNOSIS — K80.20 CALCULUS OF GALLBLADDER WITHOUT CHOLECYSTITIS WITHOUT OBSTRUCTION: ICD-10-CM

## 2022-10-21 DIAGNOSIS — R74.01 ELEVATED ALT MEASUREMENT: ICD-10-CM

## 2022-10-21 DIAGNOSIS — R79.89 ELEVATED LFTS: ICD-10-CM

## 2022-10-21 PROBLEM — D50.0 IRON DEFICIENCY ANEMIA DUE TO CHRONIC BLOOD LOSS: Status: RESOLVED | Noted: 2022-03-07 | Resolved: 2022-10-21

## 2022-10-21 PROCEDURE — 76705 ECHO EXAM OF ABDOMEN: CPT

## 2022-10-21 NOTE — RESULT ENCOUNTER NOTE
Sera Ms. Arrington,  Your results came back and ultrasound done for elevated liver test shows incidental gallstones without evidence of inflammation of the gallbladder.  Monitor for right upper abdominal pain that would occur mostly after food usually after a fatty meal.  If that keeps occurring or gets worse we could then refer you to see the general surgeon to have your gallbladder removed but right now this does not appear to be contributing to your elevated liver tests.    You do have hepatic steatosis which means fat deposit in the liver this is the most likely cause for elevated liver tests.  It can get worse over time and contribute to liver scarring/cirrhosis.  Recommend weight loss like you are trying to do & eating healthier helps. In the future you may need a cholesterol-lowering medication but for now I think it might be good to just see the GI doctor in a consultation so they can evaluate this hepatic steatosis and set up routine surveillance & monitor you over time to make sure things do not get worse.  I will put this referral in and you can schedule at your convenience.    If you have any further concerns please do not hesitate to contact us by message, phone or making an appointment.  Have a good day   Dr Hitesh MARIE

## 2022-11-23 NOTE — TELEPHONE ENCOUNTER
RECORDS RECEIVED FROM: hepatic steatosis, elevated LFTs   Appt Date: 1.16.23   NOTES STATUS DETAILS   OFFICE NOTE from referring provider Internal 10.21.22 LANI Proctor Paton   OFFICE NOTES from other specialists     DISCHARGE SUMMARY from hospital     MEDICATION LIST Internal    LIVER BIOSPY (IF APPLICABLE)      PATHOLOGY REPORTS      IMAGING     ENDOSCOPY (IF AVAILABLE)     COLONOSCOPY (IF AVAILABLE)     ULTRASOUND LIVER Internal 10.21.22    CT OF ABDOMEN     MRI OF LIVER     FIBROSCAN, US ELASTOGRAPHY, FIBROSIS SCAN, MR ELASTOGRAPHY     LABS     HEPATIC PANEL (LIVER PANEL) Internal 1.17.22   BASIC METABOLIC PANEL Internal 10.6.22   COMPLETE METABOLIC PANEL     COMPLETE BLOOD COUNT (CBC) Internal 6.29.22   INTERNATIONAL NORMALIZED RATIO (INR)     HEPATITIS C ANTIBODY     HEPATITIS C VIRAL LOAD/PCR     HEPATITIS C GENOTYPE     HEPATITIS B SURFACE ANTIGEN     HEPATITIS B SURFACE ANTIBODY     HEPATITIS B DNA QUANT LEVEL     HEPATITIS B CORE ANTIBODY

## 2023-01-16 ENCOUNTER — PRE VISIT (OUTPATIENT)
Dept: GASTROENTEROLOGY | Facility: CLINIC | Age: 43
End: 2023-01-16

## 2023-01-23 DIAGNOSIS — K76.0 HEPATIC STEATOSIS: Primary | ICD-10-CM

## 2023-01-23 DIAGNOSIS — R79.89 ELEVATED LFTS: ICD-10-CM

## 2023-01-31 ENCOUNTER — ANCILLARY PROCEDURE (OUTPATIENT)
Dept: MAMMOGRAPHY | Facility: CLINIC | Age: 43
End: 2023-01-31
Attending: FAMILY MEDICINE
Payer: COMMERCIAL

## 2023-01-31 DIAGNOSIS — Z12.31 VISIT FOR SCREENING MAMMOGRAM: ICD-10-CM

## 2023-01-31 PROCEDURE — 77067 SCR MAMMO BI INCL CAD: CPT | Mod: TC | Performed by: RADIOLOGY

## 2023-02-07 DIAGNOSIS — R79.89 ELEVATED LFTS: ICD-10-CM

## 2023-02-07 DIAGNOSIS — K76.0 HEPATIC STEATOSIS: Primary | ICD-10-CM

## 2023-02-09 ASSESSMENT — ENCOUNTER SYMPTOMS
CHILLS: 0
SHORTNESS OF BREATH: 0
MYALGIAS: 0
FEVER: 0
BREAST MASS: 0
NAUSEA: 0
DIZZINESS: 0
SORE THROAT: 0
HEADACHES: 0
EYE PAIN: 0
HEMATURIA: 0
HEMATOCHEZIA: 0
ARTHRALGIAS: 0
DIARRHEA: 0
FREQUENCY: 0
COUGH: 0
ABDOMINAL PAIN: 0
HEARTBURN: 0
JOINT SWELLING: 0
PALPITATIONS: 0
NERVOUS/ANXIOUS: 0
PARESTHESIAS: 0
WEAKNESS: 0
CONSTIPATION: 0
DYSURIA: 0

## 2023-02-10 ENCOUNTER — OFFICE VISIT (OUTPATIENT)
Dept: FAMILY MEDICINE | Facility: CLINIC | Age: 43
End: 2023-02-10
Attending: FAMILY MEDICINE
Payer: COMMERCIAL

## 2023-02-10 VITALS
TEMPERATURE: 97.3 F | HEART RATE: 74 BPM | SYSTOLIC BLOOD PRESSURE: 118 MMHG | OXYGEN SATURATION: 97 % | HEIGHT: 64 IN | RESPIRATION RATE: 20 BRPM | DIASTOLIC BLOOD PRESSURE: 80 MMHG | WEIGHT: 189 LBS | BODY MASS INDEX: 32.27 KG/M2

## 2023-02-10 DIAGNOSIS — Z83.719 FAMILY HISTORY OF COLONIC POLYPS: ICD-10-CM

## 2023-02-10 DIAGNOSIS — Z71.89 ADVANCED DIRECTIVES, COUNSELING/DISCUSSION: ICD-10-CM

## 2023-02-10 DIAGNOSIS — Z80.0 FAMILY HISTORY OF ESOPHAGEAL CANCER: ICD-10-CM

## 2023-02-10 DIAGNOSIS — R73.03 PREDIABETES: ICD-10-CM

## 2023-02-10 DIAGNOSIS — K76.0 HEPATIC STEATOSIS: ICD-10-CM

## 2023-02-10 DIAGNOSIS — Z83.2 FAMILY HISTORY OF FACTOR V LEIDEN MUTATION: ICD-10-CM

## 2023-02-10 DIAGNOSIS — Z90.711 H/O ABDOMINAL SUPRACERVICAL SUBTOTAL HYSTERECTOMY: ICD-10-CM

## 2023-02-10 DIAGNOSIS — Z00.00 HEALTH CARE MAINTENANCE: ICD-10-CM

## 2023-02-10 DIAGNOSIS — R79.89 ELEVATED LFTS: ICD-10-CM

## 2023-02-10 DIAGNOSIS — K80.20 CALCULUS OF GALLBLADDER WITHOUT CHOLECYSTITIS WITHOUT OBSTRUCTION: ICD-10-CM

## 2023-02-10 DIAGNOSIS — Z13.0 SCREENING, ANEMIA, DEFICIENCY, IRON: ICD-10-CM

## 2023-02-10 DIAGNOSIS — Z23 NEED FOR HEPATITIS B VACCINATION: ICD-10-CM

## 2023-02-10 DIAGNOSIS — R74.01 ELEVATED ALT MEASUREMENT: ICD-10-CM

## 2023-02-10 DIAGNOSIS — Z00.00 ROUTINE HISTORY AND PHYSICAL EXAMINATION OF ADULT: Primary | ICD-10-CM

## 2023-02-10 LAB
ALBUMIN SERPL BCG-MCNC: 4.7 G/DL (ref 3.5–5.2)
ALP SERPL-CCNC: 89 U/L (ref 35–104)
ALT SERPL W P-5'-P-CCNC: 118 U/L (ref 10–35)
ANION GAP SERPL CALCULATED.3IONS-SCNC: 13 MMOL/L (ref 7–15)
AST SERPL W P-5'-P-CCNC: 64 U/L (ref 10–35)
BILIRUB DIRECT SERPL-MCNC: <0.2 MG/DL (ref 0–0.3)
BILIRUB SERPL-MCNC: 0.7 MG/DL
BUN SERPL-MCNC: 10.8 MG/DL (ref 6–20)
CALCIUM SERPL-MCNC: 10.1 MG/DL (ref 8.6–10)
CHLORIDE SERPL-SCNC: 99 MMOL/L (ref 98–107)
CHOLEST SERPL-MCNC: 252 MG/DL
CREAT SERPL-MCNC: 0.73 MG/DL (ref 0.51–0.95)
DEPRECATED HCO3 PLAS-SCNC: 25 MMOL/L (ref 22–29)
ERYTHROCYTE [DISTWIDTH] IN BLOOD BY AUTOMATED COUNT: 13 % (ref 10–15)
FERRITIN SERPL-MCNC: 125 NG/ML (ref 6–175)
GFR SERPL CREATININE-BSD FRML MDRD: >90 ML/MIN/1.73M2
GLUCOSE SERPL-MCNC: 101 MG/DL (ref 70–99)
HBA1C MFR BLD: 5.5 % (ref 0–5.6)
HCT VFR BLD AUTO: 44.2 % (ref 35–47)
HDLC SERPL-MCNC: 44 MG/DL
HGB BLD-MCNC: 14.9 G/DL (ref 11.7–15.7)
INR PPP: 0.99 (ref 0.85–1.15)
IRON BINDING CAPACITY (ROCHE): 370 UG/DL (ref 240–430)
IRON SATN MFR SERPL: 24 % (ref 15–46)
IRON SERPL-MCNC: 87 UG/DL (ref 37–145)
LDLC SERPL CALC-MCNC: 147 MG/DL
MCH RBC QN AUTO: 28.9 PG (ref 26.5–33)
MCHC RBC AUTO-ENTMCNC: 33.7 G/DL (ref 31.5–36.5)
MCV RBC AUTO: 86 FL (ref 78–100)
NONHDLC SERPL-MCNC: 208 MG/DL
PLATELET # BLD AUTO: 308 10E3/UL (ref 150–450)
POTASSIUM SERPL-SCNC: 4.2 MMOL/L (ref 3.4–5.3)
PROT SERPL-MCNC: 8.1 G/DL (ref 6.4–8.3)
RBC # BLD AUTO: 5.16 10E6/UL (ref 3.8–5.2)
SODIUM SERPL-SCNC: 137 MMOL/L (ref 136–145)
TRIGL SERPL-MCNC: 307 MG/DL
TSH SERPL DL<=0.005 MIU/L-ACNC: 2.73 UIU/ML (ref 0.3–4.2)
VIT B12 SERPL-MCNC: 672 PG/ML (ref 232–1245)
WBC # BLD AUTO: 7.7 10E3/UL (ref 4–11)

## 2023-02-10 PROCEDURE — 86704 HEP B CORE ANTIBODY TOTAL: CPT | Performed by: FAMILY MEDICINE

## 2023-02-10 PROCEDURE — 82728 ASSAY OF FERRITIN: CPT | Performed by: FAMILY MEDICINE

## 2023-02-10 PROCEDURE — 99396 PREV VISIT EST AGE 40-64: CPT | Performed by: FAMILY MEDICINE

## 2023-02-10 PROCEDURE — 80053 COMPREHEN METABOLIC PANEL: CPT | Performed by: FAMILY MEDICINE

## 2023-02-10 PROCEDURE — 82248 BILIRUBIN DIRECT: CPT | Performed by: FAMILY MEDICINE

## 2023-02-10 PROCEDURE — 87340 HEPATITIS B SURFACE AG IA: CPT | Performed by: FAMILY MEDICINE

## 2023-02-10 PROCEDURE — 85610 PROTHROMBIN TIME: CPT | Performed by: FAMILY MEDICINE

## 2023-02-10 PROCEDURE — 86706 HEP B SURFACE ANTIBODY: CPT | Performed by: FAMILY MEDICINE

## 2023-02-10 PROCEDURE — 86803 HEPATITIS C AB TEST: CPT | Performed by: FAMILY MEDICINE

## 2023-02-10 PROCEDURE — 84443 ASSAY THYROID STIM HORMONE: CPT | Performed by: FAMILY MEDICINE

## 2023-02-10 PROCEDURE — 80061 LIPID PANEL: CPT | Performed by: FAMILY MEDICINE

## 2023-02-10 PROCEDURE — 36415 COLL VENOUS BLD VENIPUNCTURE: CPT | Performed by: FAMILY MEDICINE

## 2023-02-10 PROCEDURE — 82607 VITAMIN B-12: CPT | Performed by: FAMILY MEDICINE

## 2023-02-10 PROCEDURE — 85027 COMPLETE CBC AUTOMATED: CPT | Performed by: FAMILY MEDICINE

## 2023-02-10 PROCEDURE — 83036 HEMOGLOBIN GLYCOSYLATED A1C: CPT | Performed by: FAMILY MEDICINE

## 2023-02-10 PROCEDURE — 83540 ASSAY OF IRON: CPT | Performed by: FAMILY MEDICINE

## 2023-02-10 PROCEDURE — 83550 IRON BINDING TEST: CPT | Performed by: FAMILY MEDICINE

## 2023-02-10 ASSESSMENT — ENCOUNTER SYMPTOMS
DYSURIA: 0
NAUSEA: 0
PARESTHESIAS: 0
ABDOMINAL PAIN: 0
COUGH: 0
CONSTIPATION: 0
DIZZINESS: 0
JOINT SWELLING: 0
DIARRHEA: 0
SORE THROAT: 0
HEADACHES: 0
BREAST MASS: 0
PALPITATIONS: 0
NERVOUS/ANXIOUS: 0
HEMATOCHEZIA: 0
EYE PAIN: 0
MYALGIAS: 0
CHILLS: 0
SHORTNESS OF BREATH: 0
WEAKNESS: 0
HEMATURIA: 0
ARTHRALGIAS: 0
FEVER: 0
FREQUENCY: 0
HEARTBURN: 0

## 2023-02-10 NOTE — RESULT ENCOUNTER NOTE
Sera BridgesKam Arrington,  Some of your results came back and are within acceptable limits. -A1C (diabetic test) is normal and indicates that your blood sugar has been in a normal range the last 3 months.. If you have any further concerns please do not hesitate to contact us by message, phone or making an appointment.  Have a good day   Dr Hitesh MARIE

## 2023-02-10 NOTE — PATIENT INSTRUCTIONS
Seen today for preventive physical and follow-up from last visit.  Breast exam normal continue with self checks regularly.  Mammogram normal 1/2023.  Check yearly.  Pap HPV due in 2027 given had a supracervical hysterectomy and cervix still in place.  History of prior endometrioma removal along with fibroids if should have pain to follow-up with Gyn.  Some mild bleeding here and there may be normal if persists or gets worse to contact gynecology.    Healthcare maintenance reviewed.  Consider working on healthcare directives.  Vaccines for COVID, flu and Tdap up-to-date.  Consider hepatitis B vaccination if lab work for GI done today in anticipation of appointment next week shows not immune to hepatitis B.  Hepatitis B vaccine is given at 0, 2 and 4-month intervals.  Labs today and will make further recommendations once those are reviewed.    BMI improved to 32 continue with healthy lifestyle increased physical activity low-carb intake and healthy diet.  Elevated LFTs liver tests and ultrasound showed hepatic steatosis/fatty liver.  To see GI on the 13th and labs for them released today to be done and can discuss results with them at visit.    Gallstones appear to be asymptomatic monitor for pain after a fatty meal.  Sometimes gallstones can occur with rapid weight loss.    History of prior prediabetes currently labs show things have improved continue with eating healthy and low-carb diet.    Can consider  referral given family history of colonic polyps and esophageal cancer if desired.  Currently opts to defer this.    Does not have factor V Leiden deficiency on testing given family history.    Return in 1 year for preventive physical and sooner on an office visit for any new concerns.    Preventive Health Recommendations  Female Ages 40 to 49    Yearly exam:   See your health care provider every year in order to  Review health changes.   Discuss preventive care.    Review your medicines if your doctor  prescribed any.    Get a Pap test every three years (unless you have an abnormal result and your provider advises testing more often).    If you get Pap tests with HPV test, you only need to test every 5 years, unless you have an abnormal result. You do not need a Pap test if your uterus was removed (hysterectomy) and you have not had cancer.    You should be tested each year for STDs (sexually transmitted diseases), if you're at risk.   Ask your doctor if you should have a mammogram.    Have a colonoscopy (test for colon cancer) if someone in your family has had colon cancer or polyps before age 50.     Have a cholesterol test every 5 years.     Have a diabetes test (fasting glucose) after age 45. If you are at risk for diabetes, you should have this test every 3 years.    Shots: Get a flu shot each year. Get a tetanus shot every 10 years.     Nutrition:   Eat at least 5 servings of fruits and vegetables each day.  Eat whole-grain bread, whole-wheat pasta and brown rice instead of white grains and rice.  Get adequate Calcium and Vitamin D.      Lifestyle  Exercise at least 150 minutes a week (an average of 30 minutes a day, 5 days a week). This will help you control your weight and prevent disease.  Limit alcohol to one drink per day.  No smoking.   Wear sunscreen to prevent skin cancer.  See your dentist every six months for an exam and cleaning.

## 2023-02-10 NOTE — RESULT ENCOUNTER NOTE
Sera Ms. Arrington,  Your results came back  showing   -LDL(bad) cholesterol level is elevated, HDL(good) cholesterol level is low and your triglycerides are elevated which can increase your heart disease risk.  A diet high in fat and simple carbohydrates, genetics and being overweight can contribute to this.   The 10-year ASCVD risk score (Vipin LOU, et al., 2019) is: 1.3%    Values used to calculate the score:      Age: 42 years      Sex: Female      Is Non- : No      Diabetic: No      Tobacco smoker: No      Systolic Blood Pressure: 118 mmHg      Is BP treated: No      HDL Cholesterol: 44 mg/dL      Total Cholesterol: 252 mg/dL  Overall cardiovascular risk is low  ADVISE: exercising 150 minutes of aerobic exercise per week (30 minutes for 5 days per week or 50 minutes for 3 days per week are options), eating a low saturated fat/low carbohydrate diet, and omega-3 fatty acids (fish oil) [id number] mg daily are helpful to improve this. In 12 months, you should recheck your fasting cholesterol panel   -TSH (thyroid stimulating hormone) level is normal which indicates normal thyroid function.  -Ferritin (iron) level is normal.. If you have any further concerns please do not hesitate to contact us by message, phone or making an appointment.  Have a good day   Dr Hitesh MARIE

## 2023-02-10 NOTE — PROGRESS NOTES
SUBJECTIVE:   CC: Arabella is an 42 year old who presents for preventive health visit.   Patient has been advised of split billing requirements and indicates understanding: Yes  Healthy Habits:     Getting at least 3 servings of Calcium per day:  Yes    Bi-annual eye exam:  Yes    Dental care twice a year:  Yes    Sleep apnea or symptoms of sleep apnea:  None    Diet:  Regular (no restrictions)    Frequency of exercise:  2-3 days/week    Duration of exercise:  30-45 minutes    Taking medications regularly:  Yes    Medication side effects:  Not applicable    PHQ-2 Total Score: 0    Additional concerns today:  No    CURRENTLY   Here for a physical   Training for 5 k with sis, 2 months to go  No breast issues.  Mammogram done 1/3/2023 was normal.  History of supracervical hysterectomy Pap HPV due 2027.  Had a little bit of blood noticed on wiping this past Monday lasted less than an hour.  Was told had a better future still left behind due to attachment.  Does have history of endometrioma no pain cervix is in place currently no symptoms.  Healthcare maintenance reviewed.  No ACP on file working on it at home.  Vaccines reviewed and up-to-date for flu Tdap and COVID.  Discussed hepatitis B vaccination.  No records available when grew up in [place].  We will be getting hepatitis B titers for upcoming GI visit and make decision after that.    BMI down from 33 to 32,   Hepatic steatosis / Elevated LFT , To see gi on Monday   Has labs to do for them in computer, Ok to do today fo them with my labs    Asymptomatic gall stones    Prediabetes working on weight loss    Fh of colon polyps  Fh of esophageal cancer   Opts no    Will do colonoscopy Q 5 yrs  Last colonoscopy first one in 2/2022 was normal    H/O supracervical hysterectomy   Hx of abdominal supracervical subtotal hysterectomy on 7/1/22, bilateral salpingectomy, left oophorectomy, cystoscopy, pelvic washings. Pathology showed Uterus, supracervical  hysterectomy: Benign endometrial polyp on a background of inactive-type endometrium, negative for hyperplasia or atypia, Leiomyomas, Uterine serosal endometriosis and adhesions. Left ovary and fallopian tube, salpingo-oophorectomy: Ovary with a benign endometriotic cyst & Fallopian tube with endometriosis, Right fallopian tube, salpingectomy: Fallopian tube with endometriosis. Uterus, myomectomy: -Leiomyoma. Pelvic washings were normal. Post op Hb was 10. Seen by gyn 22 for 2 week post op check, and 22 for 6 week post op check and noted was doing well. Advised pap every 5 yrs,as cervix still in place,  due  and to see gyn if should have pain given the hx of endometriosis.   Hx of endometrioma  No pain  Bled a bit this month, Bled  one hr very faint on wiping only, Cervix in place, reports gyn told her that a tiny bit f uterus left in as too close to bladder to remove so suspects bled from that. Has One ovary in place    Hx of Iron def prev on iron till had hysterectomy done in 2022. Off the iron post op and noted iron improved and ferritin normal oct 2022 off the iron.      factor v  In fh, her test was neg     FH colonic polyps, had colonoscopy in  was normal advised recheck in    FH of esophageal cancer, FH of factor 5, Opted no genetic testing for now     BACKGROUND   42-year-old lady, , former smoker, BMI more than 32, history of prediabetes with hemoglobin A1c of 5.7,  with history of  intramural leiomyomas and left ovarian cyst, & AUB,  prior menorrhagia with regular cycles, family history of factor V Leiden mutation but personally tested negative, family history of dad with a colectomy history due to polyps and/serrated adenoma, colonoscopy done in  in self was normal, family history of esophageal cancer currently opting to defer  consult, history of iron deficiency anemia due to chronic blood loss, ,  previously under care of Barbara Ha, seen by GYN  in 2018  for fibroids following episode of acute pain that made her seek ER care & ct scan ordered. Was told fibroids were unlikely cause of the acute severe pain at that time. Options discussed and was to think about it and get back to gynecology at that time.  [place]  negative.  Seen first time by this provider on 1/17/22 for preventive health and additional concerns. Discussed self breast check regularly. To consider referral to  given fh of esophageal cancer , colon polyps, factor 5 if insurance would cover. Mammogram due and referral placed. Was to return for a Pelvic / PAP when not on her period. Healthcare maintenance reviewed. To consider working on healthcare directives, & honoring choices given. Noted COVID-vaccine including booster and flu shot up-to-date. Labs done.   BMI 32:  Encouraged a healthy diet, frequent small meals, less carbohydrates more Mediterranean-style.  To try to lose at least 10% of total body weight over time to be healthier.  After the visit hemoglobin A1c came back elevated at 5.7 suggesting prediabetes, recommended rechecking in 6 months to 1 year  Due to prior left ovarian cyst, fibroids of uterus and heavy periods, a repeat pelvic ultrasound was ordered. After the visit hemoglobin came back showing anemia with hemoglobin 9.8 and MCH MCV suggestive of iron deficiency, iron and ferritin came back low with ferritin of 9 & iron of 16, suspected from heavy periods.  Was asymptomatic.  Recommended to increase iron in diet, consider taking over-the-counter iron daily and recheck in 3 months with CBC, iron, ferritin, B12, reticulocyte & peripheral smear.  For mole on nose and on right leg and multiple pigmented nevi on skin of face, neck, back, chest, abdomen & extremities,  referred to dermatology for skin check and mole removal. Discussed family history of esophageal cancer and colonic polyps.  Advised to Increase fiber in diet and referred to GI for screening colonoscopy.  Was  to consider a  consultation. Family history of factor V Leiden mutation  & testing done after genetic form signed as could help make recommendations in the future with regard to heavy periods and hormonal treatment.  If positive for clotting disorder would avoid estrogen containing compounds.  Labs came back showing a mildly elevated LDL with ASCVD risk of 2%, CMP normal TSH normal ferritin 9 iron 16, hemoglobin 9.8, AST minimally elevated, glucose minimally elevated, hemoglobin A1c 5.7. Mammogram done 1/28 was normal. Pelvic ultrasound on 2/8/2022 showed fibroid filled uterus ovaries not visible and referred to gynecology. Seen by gynecology virtually on 2/11/2022 and advised an MRI scheduled for 12 March and an EMB scheduled for the 22nd and was considering a hysterectomy given had no plans to start a family.   Colonoscopy done 2/18/2022 was normal recommended recheck in 10 years and advised to find out more information on dad's reason for colectomy given history of polyps to see if genetic testing could be done and also to check for celiac given iron deficiency anemia even if probably due to heavy menses.       Seen 3/7/22 for a pap.  Was to continue iron supplement daily and recheck iron labs & for celiac in 3 months.  Continued on psyllium to prevent constipation from iron. Iron deficiency anemia suspected due to chronic loss due to heavy periods, suspected due to large fibroids. History of ovarian cyst but unable to see ovaries on most recent ultrasound due to fibroid distortion. Family history of factor V Leiden with a recent factor V testing was negative. Discussed Family history of dad having had a colectomy for many serrated adenoma polyps, sister also with history of colonic polyps.  Recent colonoscopy on 2/18/2022 was normal and recommended recheck in 10 years.  GI did recommend checking for celiac and this was to be added to future labs. Given family history though would recommend   consult to assess risk of a genetic polyposis condition.  If unable to do genetic testing to consider colonoscopy every 5 years instead to be safe. Discussed genetic testing may be helpful also given family history of esophageal cancer. Was to continue omega for mildly elevated LDL.  Overall cardiovascular risk was low. Given history of prediabetes and BMI, to continue with fiber intake and eating healthy and losing weight to lower cardiovascular & cancer risk. Discussed could refer to hematology if hemoglobin did not go adequately up post surgery.   Seen by gyn 3/22 and options discussed and planning n MATTHEW, B/l salpingectomy and cystoscopy but to leave ovaries in place unless something found intraoperatively. Planned for iv iron at time of surgery.   On 4/15/21 cbc noted improved to hb of 13.4, celiac testing was negative. Peripheral smear was normal, iron improved form 16 to 36, ferritin improved to 38 & vit B 12 was 562 , wnl.   Seen by derm 4/28/22 and had a shave biopsy of nose lesion and noted other benign lesions.  Seen 6/3/22 for preop for hysterectomy.  CMP showed elevated ALT.  Glucose is elevated.  Hemoglobin A1c in normal range.  Had a normal CBC and cleared for procedure.  On 7/1/22 underwent Abdominal supracervical hysterectomy, bilateral salpingectomy, left oophorectomy, cystoscopy, pelvic washings  For abnormal uterine bleeding, Fibroid uterus w/ bulk symptoms & suspected bilateral hydrosalpinx. Post-operative diagnosis was Fibroid uterus  & Endometriosis with very large endometrioma. Pathology showed Uterus, supracervical hysterectomy: Benign endometrial polyp on a background of inactive-type endometrium, negative for hyperplasia or atypia, Leiomyomas, Uterine serosal endometriosis and adhesions. Left ovary and fallopian tube, salpingo-oophorectomy: Ovary with a benign endometriotic cyst & Fallopian tube with endometriosis, Right fallopian tube, salpingectomy: Fallopian tube with endometriosis.  Uterus, myomectomy: -Leiomyoma. Pelvic washings were normal. Post op Hb was 10. Seen by gyn 7/18/22 for 2 week post op check, and 8/17/22 for 6 week post op check and noted was doing well.     Seen 10/6/22 Iron def was on iron till had hysterectomy in done in July. Off the iron post op and here for recheck, feeling well. Hx of abdominal supracervical subtotal hysterectomy on 7/1/22, bilateral salpingectomy, left oophorectomy, cystoscopy, pelvic washings. Pathology showed Uterus, supracervical hysterectomy: Benign endometrial polyp on a background of inactive-type endometrium, negative for hyperplasia or atypia, Leiomyomas, Uterine serosal endometriosis and adhesions. Left ovary and fallopian tube, salpingo-oophorectomy: Ovary with a benign endometriotic cyst & Fallopian tube with endometriosis, Right fallopian tube, salpingectomy: Fallopian tube with endometriosis. Uterus, myomectomy: -Leiomyoma. Pelvic washings were normal. Post op Hb was 10. Seen by gyn 7/18/22 for 2 week post op check, and 8/17/22 for 6 week post op check and noted was doing well. Advised pap every 5 yrs,as cervix still in place,  due 2027 and to see gyn if should have pain given the hx of endometriosis. BMI 33, weight up since last visit as not been able to exercise much due to surgery till now. Working on a couch to [employer] program with sister. Hx of prediabetes,    Elevated ALT, to consider u/s liver. FH colonic polyps, had colonoscopy in 2022 was normal advised recheck in 2025. FH of esophageal cancer, FH of factor 5, Opted no genetic testing for now. Noted a potential exposure to Covid notified on phone but tested negative that am & was asymptomatic so far. Partner had Covid recently and tested positive till 2 weeks prior.was given the flu and Moderna COVID booster.  Labs later showed BMP with elevated glucose, LFTs with elevated AST and ALT, ferritin and CBC and hemoglobin A1c were normal.  Ultrasound done to evaluate liver test on 10/24/2022  showed asymptomatic gallstones and hepatic steatosis and referred to GI.  Mammogram done 2023 was normal.     Today's PHQ-2 Score:   PHQ-2 (  [employer]) 2023   Q1: Little interest or pleasure in doing things 0   Q2: Feeling down, depressed or hopeless 0   PHQ-2 Score 0   Q1: Little interest or pleasure in doing things Not at all   Q2: Feeling down, depressed or hopeless Not at all   PHQ-2 Score 0     Social History     Tobacco Use     Smoking status: Former     Packs/day: 0.50     Years: 11.00     Pack years: 5.50     Types: Cigarettes     Start date: 1999     Quit date: 11/15/2009     Years since quittin.2     Smokeless tobacco: Never   Substance Use Topics     Alcohol use: Yes     Comment: 0-1 drink weekly     If you drink alcohol do you typically have >3 drinks per day or >7 drinks per week? No    Alcohol Use 2/10/2023   Prescreen: >3 drinks/day or >7 drinks/week? -   Prescreen: >3 drinks/day or >7 drinks/week? No     Reviewed orders with patient.  Reviewed health maintenance and updated orders accordingly - Yes  Lab work is in process  Labs reviewed in EPIC  BP Readings from Last 3 Encounters:   02/10/23 118/80   10/06/22 129/86   22 125/81    Wt Readings from Last 3 Encounters:   02/10/23 85.7 kg (189 lb)   10/06/22 87.5 kg (192 lb 12.8 oz)   22 83.9 kg (185 lb)          Patient Active Problem List   Diagnosis     BMI 32.0-32.9,adult     Family history of esophageal cancer     Family history of colonic polyps     Family history of factor V Leiden mutation     Prediabetes     H/O abdominal supracervical subtotal hysterectomy     Hepatic steatosis     Elevated LFTs     Calculus of gallbladder without cholecystitis without obstruction     Past Surgical History:   Procedure Laterality Date     COLONOSCOPY  2022     HYSTERECTOMY SUPRACERVICAL Bilateral 2022    Procedure: Abdominal Supracervical Hysterectomy, Bilateral Salpingectomy, left oophorectomy, cystoscopy and pelvic  washings.;  Surgeon: Kasandra Multani MD;  Location: [facility] OR     no surgical history         Social History     Tobacco Use     Smoking status: Former     Packs/day: 0.50     Years: 11.00     Pack years: 5.50     Types: Cigarettes     Start date: 1999     Quit date: 11/15/2009     Years since quittin.2     Smokeless tobacco: Never   Substance Use Topics     Alcohol use: Yes     Comment: 0-1 drink weekly     Family History   Problem Relation Age of Onset     Esophageal Cancer Mother 55        esophageal cancer     Blood Disease Mother         factor 5     Diabetes Father         type 2     Respiratory Father         emphasema     Colon Polyps Father         had colon removed     Diabetes Paternal Grandmother          No current outpatient medications on file.     No Known Allergies  Recent Labs   Lab Test 02/10/23  1118 10/06/22  1130 22  1348 22  1111 22  1111 12/18/15  1041 12/01/15  0932   A1C 5.5 5.4 5.5   < > 5.7*  --   --    LDL  --   --   --   --  158*  --  137*   HDL  --   --   --   --  43*  --  43*   TRIG  --   --   --   --  203*  --  367*   ALT  --  161* 71*  --  56*  --   --    CR  --  0.66 0.76   < > 0.77  --   --    GFRESTIMATED  --  >90 >90   < > >90  --   --    POTASSIUM  --  3.9 4.0   < > 3.9  --   --    TSH  --   --   --   --  3.44 3.12  --     < > = values in this interval not displayed.        Breast Cancer Screening:    Breast CA Risk Assessment (FHS-7) 2022   Do you have a family history of breast, colon, or ovarian cancer? Yes No / Unknown       Mammogram Screening - Offered annual screening and updated Health Maintenance for mutual plan based on risk factor consideration    Pertinent mammograms are reviewed under the imaging tab.    History of abnormal Pap smear:   NO - age 30-65 PAP every 5 years with negative HPV co-testing recommended  Last 3 Pap and HPV Results:   PAP / HPV Latest Ref Rng & Units 3/7/2022 2015 3/13/2014   PAP   Negative for  Intraepithelial Lesion or Malignancy (NILM) - -   PAP (Historical) - - NIL NIL   HPV16 Negative Negative - -   HPV18 Negative Negative - -   HRHPV Negative Negative - -     PAP / HPV Latest Ref Rng & Units 3/7/2022 2015 3/13/2014   PAP   Negative for Intraepithelial Lesion or Malignancy (NILM) - -   PAP (Historical) - - NIL NIL   HPV16 Negative Negative - -   HPV18 Negative Negative - -   HRHPV Negative Negative - -     Reviewed and updated as needed this visit by clinical staff   Tobacco  Allergies  Meds   Med Hx  Surg Hx  Fam Hx  Soc Hx        Reviewed and updated as needed this visit by Provider   Tobacco  Allergies  Meds   Med Hx  Surg Hx  Fam Hx  Soc Hx       Past Medical History:   Diagnosis Date     Cyst of left ovary 2016     Endometriosis 10/6/2022     Intramural leiomyoma of uterus 2016     Iron deficiency anemia due to chronic blood loss 3/7/2022     Menorrhagia with regular cycle 2022     none       Past Surgical History:   Procedure Laterality Date     COLONOSCOPY  2022     HYSTERECTOMY SUPRACERVICAL Bilateral 2022    Procedure: Abdominal Supracervical Hysterectomy, Bilateral Salpingectomy, left oophorectomy, cystoscopy and pelvic washings.;  Surgeon: Kasandra Multani MD;  Location: [facility]     no surgical history       OB History    Para Term  AB Living   0 0 0 0 0 0   SAB IAB Ectopic Multiple Live Births   0 0 0 0 0       Review of Systems   Constitutional: Negative for chills and fever.   HENT: Negative for congestion, ear pain, hearing loss and sore throat.    Eyes: Negative for pain and visual disturbance.   Respiratory: Negative for cough and shortness of breath.    Cardiovascular: Negative for chest pain, palpitations and peripheral edema.   Gastrointestinal: Negative for abdominal pain, constipation, diarrhea, heartburn, hematochezia and nausea.   Breasts:  Negative for tenderness, breast mass and discharge.   Genitourinary: Negative for  "dysuria, frequency, genital sores, hematuria, pelvic pain, urgency, vaginal bleeding and vaginal discharge.   Musculoskeletal: Negative for arthralgias, joint swelling and myalgias.   Skin: Negative for rash.   Neurological: Negative for dizziness, weakness, headaches and paresthesias.   Psychiatric/Behavioral: Negative for mood changes. The patient is not nervous/anxious.      OBJECTIVE:   /80 (BP Location: Left arm, Patient Position: Sitting, Cuff Size: Adult Regular)   Pulse 74   Temp 97.3  F (36.3  C) (Temporal)   Resp 20   Ht 1.623 m (5' 3.9\")   Wt 85.7 kg (189 lb)   LMP 03/12/2022 (Exact Date)   SpO2 97%   BMI 32.54 kg/m    Physical Exam  GENERAL: healthy, alert, no distress and obese  EYES: Eyes grossly normal to inspection, PERRL and conjunctivae and sclerae normal, glasses  HENT: ear canals and TM's normal, nose and mouth without ulcers or lesions  NECK: no adenopathy, no asymmetry, masses, or scars and thyroid normal to palpation  RESP: lungs clear to auscultation - no rales, rhonchi or wheezes  BREAST: normal without masses, tenderness or nipple discharge and no palpable axillary masses or adenopathy  CV: regular rate and rhythm, normal S1 S2, no S3 or S4, no murmur, click or rub, no peripheral edema and peripheral pulses strong  ABDOMEN: soft, non tender, no hepatosplenomegaly, no masses and bowel sounds normal  MS: no gross musculoskeletal defects noted, no edema  SKIN: no suspicious lesions or rashes, multiple pigmented nevi  NEURO: Normal strength and tone, mentation intact and speech normal  PSYCH: mentation appears normal, affect normal/bright  LYMPH: no cervical, supraclavicular, axillary, or inguinal adenopathy    Diagnostic Test Results:  Labs reviewed in Epic  Results for orders placed or performed in visit on 02/10/23 (from the past 24 hour(s))   INR   Result Value Ref Range    INR 0.99 0.85 - 1.15   CBC with platelets   Result Value Ref Range    WBC Count 7.7 4.0 - 11.0 10e3/uL "    RBC Count 5.16 3.80 - 5.20 10e6/uL    Hemoglobin 14.9 11.7 - 15.7 g/dL    Hematocrit 44.2 35.0 - 47.0 %    MCV 86 78 - 100 fL    MCH 28.9 26.5 - 33.0 pg    MCHC 33.7 31.5 - 36.5 g/dL    RDW 13.0 10.0 - 15.0 %    Platelet Count 308 150 - 450 10e3/uL   Hemoglobin A1c   Result Value Ref Range    Hemoglobin A1C 5.5 0.0 - 5.6 %     Results for orders placed or performed in visit on 02/10/23   INR     Status: Normal   Result Value Ref Range    INR 0.99 0.85 - 1.15   CBC with platelets     Status: Normal   Result Value Ref Range    WBC Count 7.7 4.0 - 11.0 10e3/uL    RBC Count 5.16 3.80 - 5.20 10e6/uL    Hemoglobin 14.9 11.7 - 15.7 g/dL    Hematocrit 44.2 35.0 - 47.0 %    MCV 86 78 - 100 fL    MCH 28.9 26.5 - 33.0 pg    MCHC 33.7 31.5 - 36.5 g/dL    RDW 13.0 10.0 - 15.0 %    Platelet Count 308 150 - 450 10e3/uL   Hemoglobin A1c     Status: Normal   Result Value Ref Range    Hemoglobin A1C 5.5 0.0 - 5.6 %       ASSESSMENT/PLAN:       ICD-10-CM    1. Routine history and physical examination of adult  Z00.00 Iron and iron binding capacity     Ferritin     Vitamin B12     Iron and iron binding capacity     Ferritin     Vitamin B12      2. BMI 32.0-32.9,adult  Z68.32 Lipid Profile (Chol, Trig, HDL, LDL calc)     TSH with free T4 reflex     Lipid Profile (Chol, Trig, HDL, LDL calc)     TSH with free T4 reflex      3. Hepatic steatosis  K76.0 Lipid Profile (Chol, Trig, HDL, LDL calc)     Hepatitis B core antibody     Hepatitis B Surface Antibody     Hepatitis B surface antigen     Hepatitis C antibody     INR     Hepatic function panel     CBC with platelets     Basic metabolic panel     Lipid Profile (Chol, Trig, HDL, LDL calc)      4. Elevated ALT measurement  R74.01 Lipid Profile (Chol, Trig, HDL, LDL calc)     PRIMARY CARE FOLLOW-UP SCHEDULING     Lipid Profile (Chol, Trig, HDL, LDL calc)      5. Elevated LFTs  R79.89 Lipid Profile (Chol, Trig, HDL, LDL calc)     Hepatitis B core antibody     INR     Hepatic function panel      CBC with platelets     Basic metabolic panel     Lipid Profile (Chol, Trig, HDL, LDL calc)      6. Calculus of gallbladder without cholecystitis without obstruction  K80.20       7. Prediabetes  R73.03 Hemoglobin A1c     PRIMARY CARE FOLLOW-UP SCHEDULING     Hemoglobin A1c      8. Family history of colonic polyps  Z83.71 PRIMARY CARE FOLLOW-UP SCHEDULING      9. Family history of esophageal cancer  Z80.0       10. H/O abdominal supracervical subtotal hysterectomy  Z90.711       11. Family history of factor V Leiden mutation  Z83.2       12. Health care maintenance  Z00.00 REVIEW OF HEALTH MAINTENANCE PROTOCOL ORDERS      13. Advanced directives, counseling/discussion  Z71.89       14. Need for hepatitis B vaccination  Z23       15. Screening, anemia, deficiency, iron  Z13.0 Iron and iron binding capacity     Ferritin     Vitamin B12     Iron and iron binding capacity     Ferritin     Vitamin B12        Seen today for preventive physical and follow-up from last visit.  Breast exam normal continue with self checks regularly.  Mammogram normal 1/2023.  Check yearly.  Pap HPV due in 2027 given had a supracervical hysterectomy and cervix still in place.  History of prior endometrioma removal along with fibroids if should have pain to follow-up with Gyn.  Some mild bleeding here and there may be normal if persists or gets worse to contact gynecology.    Healthcare maintenance reviewed.  Consider working on healthcare directives.  Vaccines for COVID, flu and Tdap up-to-date.  Consider hepatitis B vaccination if lab work for GI done today in anticipation of appointment next week shows not immune to hepatitis B.  Hepatitis B vaccine is given at 0, 2 and 4-month intervals.  Labs today and will make further recommendations once those are reviewed.    BMI improved to 32 continue with healthy lifestyle increased physical activity low-carb intake and healthy diet.  Elevated LFTs liver tests and ultrasound showed hepatic  steatosis/fatty liver.  To see GI on the 13th and labs for them released today to be done and can discuss results with them at visit.    Gallstones appear to be asymptomatic monitor for pain after a fatty meal.  Sometimes gallstones can occur with rapid weight loss.    History of prior prediabetes currently labs show things have improved continue with eating healthy and low-carb diet.    Can consider  referral given family history of colonic polyps and esophageal cancer if desired.  Currently opts to defer this.    Does not have factor V Leiden deficiency on testing given family history.    Return in 1 year for preventive physical and sooner on an office visit for any new concerns.    Patient has been advised of split billing requirements and indicates understanding: Yes      COUNSELING:  Reviewed preventive health counseling, as reflected in patient instructions       Regular exercise       Healthy diet/nutrition       Vision screening       Immunizations       Alcohol Use       Osteoporosis prevention/bone health       Colorectal Cancer Screening       The 10-year ASCVD risk score (Vipin LOU, et al., 2019) is: 1.3%    Values used to calculate the score:      Age: 42 years      Sex: Female      Is Non- : No      Diabetic: No      Tobacco smoker: No      Systolic Blood Pressure: 118 mmHg      Is BP treated: No      HDL Cholesterol: 43 mg/dL      Total Cholesterol: 242 mg/dL       Advance Care Planning    She reports that she quit smoking about 13 years ago. Her smoking use included cigarettes. She started smoking about 24 years ago. She has a 5.50 pack-year smoking history. She has never used smokeless tobacco.    Kerrie Proctor MD  [facility]

## 2023-02-11 LAB
HBV CORE AB SERPL QL IA: NONREACTIVE
HBV SURFACE AB SERPL IA-ACNC: 0.63 M[IU]/ML
HBV SURFACE AB SERPL IA-ACNC: NONREACTIVE M[IU]/ML
HBV SURFACE AG SERPL QL IA: NONREACTIVE
HCV AB SERPL QL IA: NONREACTIVE

## 2023-02-13 ENCOUNTER — OFFICE VISIT (OUTPATIENT)
Dept: GASTROENTEROLOGY | Facility: CLINIC | Age: 43
End: 2023-02-13
Attending: PHYSICIAN ASSISTANT
Payer: COMMERCIAL

## 2023-02-13 VITALS
TEMPERATURE: 98.8 F | WEIGHT: 192.8 LBS | HEART RATE: 75 BPM | BODY MASS INDEX: 33.2 KG/M2 | DIASTOLIC BLOOD PRESSURE: 71 MMHG | OXYGEN SATURATION: 96 % | SYSTOLIC BLOOD PRESSURE: 122 MMHG

## 2023-02-13 DIAGNOSIS — K76.0 HEPATIC STEATOSIS: Primary | ICD-10-CM

## 2023-02-13 PROCEDURE — 99204 OFFICE O/P NEW MOD 45 MIN: CPT | Performed by: PHYSICIAN ASSISTANT

## 2023-02-13 PROCEDURE — G0463 HOSPITAL OUTPT CLINIC VISIT: HCPCS | Performed by: PHYSICIAN ASSISTANT

## 2023-02-13 ASSESSMENT — PAIN SCALES - GENERAL: PAINLEVEL: NO PAIN (0)

## 2023-02-13 NOTE — LETTER
2/13/2023         RE: Arabella Arrington  [address] [place] [address]        Dear Colleague,    Thank you for referring your patient, Arabella Arrington, to the [facility] [place]. Please see a copy of my visit note below.    Hepatology Clinic note  Arabella Arrington   Date of Birth 1980  Date of Service 2/12/2023    REASON FOR CONSULTATION: Fatty Liver  REFERRING PROVIDER: Kerrie Proctor MD          Assessment/plan:   Arabella Arrington is a 42 year old female with history of elevated transamianes and ultrasound findings of hepatic steatosis.Risk factors for fatty liver disease includes: obesity, prediabetes, dyslipidemia  Transaminases have been elevated since January 2022. Liver function also normal without stigmata of advanced liver disease.     # NALFD:   -  We discussed the pathophysiology and natural history of nonalcoholic fatty liver disease and cirrhosis.   - Will obtain a fibrosis scan to evaluate any fibrosis, which is important in risk stratification of likelihood of on-going fibrosis without weight loss. Recommend FIB-4 yearly with PCP.   - Recommend slow gradual weight loss.   - Maintain good control of cholesterol (No contraindication to starting a statin with LFT elevations)   - Optimize blood glucose as needed. Could consider GLP-1 inhibitor for both insulin resistance and help with weight loss  - Improve nutrition: continue limiting carbohydrates, especially simple carbohydrates, and following Mediterranean eating patten  - Increase physical activity: maintain physical activity as able     # Will also rule out genetic and autoimmune causes of hepatobiliary disease.   Labs: alpha-1-antitrypsin deficiency, HUMAIRA, F-actin, TTG, IgA, ceruloplasmin   - Hepatic panel in six months     # Follow up in Clinic in one year     Akanksha Kunz PA-C   [facility] Hepatology     Total time for E/M services performed on the date of the encounter 45 minutes;  excluding performing and official interpretation of fibrosis scan reads.  This included review of previous: clinic visits, hospital records, lab results, imaging studies, and procedural documentation. Time also includes patient visit, documentation.  The findings from this review are summarized in the above note.   -----------------------------------------------------       HPI:   Arabella Arrington is a 42 year old female  presenting for the evaluation of elevated LFT's.     LFT's were first noticed to be abnormal in January 2022 and have slowly trended up over the past year.     Appetite is normal. Not a much meat eater. Making things from scratch. Trying to do more meal prep. Previously was eating chicken strips. For the past year, was eating out more. Was doing some meal prep delivery services.     Slow steady increase of weight over the past few years. Had hysterectomy this summer, was less active. Currently training for a 5K in March. Have an elipitical at home  (1 time a week, goal is 3 times a week) .     Patient denies jaundice, lower extremity edema, abdominal distension or confusion.      Patient also denies melena, hematochezia or hematemesis.    Patient denies weight loss, fevers, sweats or chills.    PMH:    has a past medical history of Cyst of left ovary (01/12/2016), Endometriosis (10/6/2022), Intramural leiomyoma of uterus (1/12/2016), Iron deficiency anemia due to chronic blood loss (3/7/2022), Menorrhagia with regular cycle (1/17/2022), and none.     SMH:    has a past surgical history that includes no surgical history; colonoscopy (02/18/2022); and Hysterectomy supracervical (Bilateral, 7/1/2022).     Medications:   - AG1 - green drink - take recommend morning drink  - Will be resuming Fish Oil     No previous tobacco use. May have 1-2 drinks on the weekend. No history of significant alcohol use.  No previous IV/IN drug use.  Currently works at a [employer] at [employer]. Patient currently lives with  partner Juan Luis. No known family history of liver disease or cancer.     Previous work-up:  HCV antibody nonreactive  HBV SAb 0.63 - Not immune  HBV SAg: nonreactive  HBV CAb: nonreactive  HUMAIRA   F-actin  AMA   Iron panel   Ferritin 125   Iron Sats: 24%   TTG   Alpha-1-antitrypsin  Ceruloplasmin   TSH 2.73   Cholesterol Total 252  HDL 44  LDL  Triglycerides  Hemoglobin A1c 5.5          Allergies:   No Known Allergies         Social History:     Social History     Socioeconomic History     Marital status: Single     Spouse name: Not on file     Number of children: Not on file     Years of education: Not on file     Highest education level: Not on file   Occupational History     Not on file   Tobacco Use     Smoking status: Former     Packs/day: 0.50     Years: 11.00     Pack years: 5.50     Types: Cigarettes     Start date: 1999     Quit date: 11/15/2009     Years since quittin.2     Smokeless tobacco: Never   Vaping Use     Vaping Use: Never used   Substance and Sexual Activity     Alcohol use: Yes     Comment: 0-1 drink weekly     Drug use: No     Sexual activity: Not Currently     Partners: Male     Birth control/protection: None   Other Topics Concern     Parent/sibling w/ CABG, MI or angioplasty before 65F 55M? No   Social History Narrative    2023: same as before .  Lives with , 3 cats, works at the [employer] in [facility].  Mom  from esophageal cancer.  Dad with history of colonic polyps.  Has 2 sisters 1 in [place] where she grew up and another in the [place].  Training to do a 5K from couch to 5K program with one of her sisters.  We will be running it in 2 months        2022: same as below         2022: lives with male partner, 20 yrs together, three cats, works at [employer] in the [employer]      Social Determinants of Health     Financial Resource Strain: Not on file   Food Insecurity: Not on file   Transportation Needs: Not on file   Physical Activity: Not on  file   Stress: Not on file   Social Connections: Not on file   Intimate Partner Violence: Not on file   Housing Stability: Not on file            Family History:     Family History   Problem Relation Age of Onset     Esophageal Cancer Mother 55        esophageal cancer     Blood Disease Mother         factor 5     Diabetes Father         type 2     Respiratory Father         emphasema     Colon Polyps Father         had colon removed     Diabetes Paternal Grandmother             Review of Systems:   Gen: See HPI     HEENT: No change in vision or hearing, mouth sores, dysphagia, lymph nodes  Resp: No shortness of breath, coughing, hx of asthma  CV: No chest pain, palpitations, syncope   GI: See HPI  : No dysuria, history of stones, urine color    Skin: No rash; no pruritus or psoriasis  MS: No arthralgias, myalgias, joint swelling  Neuro: No memory changes, confusion, numbness    Heme: No difficulty clotting, bruising, bleeding  Psych:  No anxiety, depression, agitation          Physical Exam:   /71   Pulse 75   Temp 98.8  F (37.1  C) (Oral)   Wt 87.5 kg (192 lb 12.8 oz)   LMP 03/12/2022 (Exact Date)   SpO2 96%   BMI 33.20 kg/m      Gen: A&Ox3, NAD, well developed  HEENT: non-icteric   CV: RRR, no overt murmurs  Lung: CTA Bilatererally, no wheezing or crackles.   Lym- no palpable lymphadenopathy  Abd: soft, NT, ND,  no palpable splenomegaly, liver is not palpable.   Ext: no edema, intact pulses.   Skin: No rash, no palmar erythema, telangiectasias or jaundice  Neuro: grossly intact, no asterixis   Psych: appropriate mood and affects         Data:   Reviewed in person and significant for:    Lab Results   Component Value Date     02/10/2023      Lab Results   Component Value Date    POTASSIUM 4.2 02/10/2023    POTASSIUM 3.9 10/06/2022     Lab Results   Component Value Date    CHLORIDE 99 02/10/2023    CHLORIDE 106 10/06/2022     Lab Results   Component Value Date    CO2 25 02/10/2023    CO2 25  10/06/2022     Lab Results   Component Value Date    BUN 10.8 02/10/2023    BUN 8 10/06/2022     Lab Results   Component Value Date    CR 0.73 02/10/2023       Lab Results   Component Value Date    WBC 7.7 02/10/2023    WBC 10.0 12/01/2015     Lab Results   Component Value Date    HGB 14.9 02/10/2023    HGB 12.3 12/01/2015     Lab Results   Component Value Date    HCT 44.2 02/10/2023    HCT 38.1 12/01/2015     Lab Results   Component Value Date    MCV 86 02/10/2023    MCV 85 12/01/2015     Lab Results   Component Value Date     02/10/2023     12/01/2015       Lab Results   Component Value Date    AST 64 02/10/2023     Lab Results   Component Value Date     02/10/2023     No results found for: BILICONJ   Lab Results   Component Value Date    BILITOTAL 0.7 02/10/2023       Lab Results   Component Value Date    ALBUMIN 4.7 02/10/2023    ALBUMIN 3.8 10/06/2022     Lab Results   Component Value Date    PROTTOTAL 8.1 02/10/2023      Lab Results   Component Value Date    ALKPHOS 89 02/10/2023       Lab Results   Component Value Date    INR 0.99 02/10/2023         Imaging:        EXAM: US ABDOMEN LIMITED  LOCATION: [facility]  DATE/TIME: 10/21/2022 10:42 AM     INDICATION: Transaminitis.  COMPARISON: Not available.  TECHNIQUE: Limited abdominal ultrasound.     FINDINGS:     GALLBLADDER: Cholelithiasis. Gallbladder is normal in size, without wall thickening, pericholecystic fluid, or positive Craig's sign.     BILE DUCTS: No biliary dilatation. The common duct measures 5 mm.     LIVER: Hepatic steatosis.      RIGHT KIDNEY: No hydronephrosis.     PANCREAS: The visualized portions are normal.     Spleen and left kidney are unremarkable.                                                                      IMPRESSION:  1.  Cholelithiasis, without sonographic evidence of acute cholecystitis.  2.  Hepatic steatosis.    CT ABDOMEN PELVIS W   9/14/2018 5:15 PM     INDICATION: Lower  Abdominal Pain Pelvic Pain   TECHNIQUE: CT abdomen and pelvis. Multiplanar reformation images (MPR). Dose   reduction techniques were used.   IV CONTRAST: Omnipaque 350 100ml   COMPARISON: None.     FINDINGS:   LUNG BASES: Negative.     ABDOMEN: 7 mm too small to characterize low-attenuation lesion within segment 8   of the liver, otherwise the liver is unremarkable. The spleen, pancreas,   gallbladder, adrenal glands, and kidneys are within normal limits.     PELVIS: Bladder and bowel is within normal limits. There is a large   multilobulated mass which appears to be arising off the left aspect of the   uterus measuring approximately 8.6 x 5.8 cm x 7.  The base appears to be   approximately 3.3 cm. Several additional fibroids are noted throughout the   uterus which obscures the endometrial stripe. The largest intramural fibroid   measures approximately 6.7 cm. No surrounding adenopathy.     MUSCULOSKELETAL: Negative.     CONCLUSION:   1.  Large multilobulated mass which appears to be arising off the uterus   measuring approximately 8.6 x 5.8 x 7 cm with a base measuring approximately 3.3   cm. Findings likely present a large exophytic subserosal fibroid. Recommend   correlation with previous imaging. Malignancy cannot be entirely excluded. If   this was not present on previous imaging, further workup is suggested. By report   patient had a previous ultrasound approximately one year ago and was to have a   follow-up ultrasound next week. Multiple additional fibroids are noted   throughout the uterus.        Again, thank you for allowing me to participate in the care of your patient.        Sincerely,        Akanksha Kunz PA-C

## 2023-02-13 NOTE — PROGRESS NOTES
Hepatology Clinic note  Arabella Arrington   Date of Birth 1980  Date of Service 2/13/2023    REASON FOR CONSULTATION: Fatty Liver  REFERRING PROVIDER: Kerrie Proctor MD          Assessment/plan:   Arabella Arrington is a 42 year old female with history of elevated transamianes and ultrasound findings of hepatic steatosis.Risk factors for fatty liver disease includes: obesity, prediabetes, dyslipidemia  Transaminases have been elevated since January 2022. Liver function also normal without stigmata of advanced liver disease.     # NALFD:   -  We discussed the pathophysiology and natural history of nonalcoholic fatty liver disease and cirrhosis.   - Will obtain a fibrosis scan to evaluate any fibrosis, which is important in risk stratification of likelihood of on-going fibrosis without weight loss. Recommend FIB-4 yearly with PCP.   - Recommend slow gradual weight loss.   - Maintain good control of cholesterol (No contraindication to starting a statin with LFT elevations)   - Optimize blood glucose as needed. Could consider GLP-1 inhibitor for both insulin resistance and help with weight loss  - Improve nutrition: continue limiting carbohydrates, especially simple carbohydrates, and following Mediterranean eating patten  - Increase physical activity: maintain physical activity as able     # Will also rule out genetic and autoimmune causes of hepatobiliary disease.   Labs: alpha-1-antitrypsin deficiency, HUMAIRA, F-actin, TTG, IgA, ceruloplasmin   - Hepatic panel in six months     # Follow up in Clinic in one year     Akanksha Kunz PA-C   [facility] Hepatology     Total time for E/M services performed on the date of the encounter 45 minutes; excluding performing and official interpretation of fibrosis scan reads.  This included review of previous: clinic visits, hospital records, lab results, imaging studies, and procedural documentation. Time also includes patient visit, documentation.  The findings from this  review are summarized in the above note.   -----------------------------------------------------       HPI:   Arabella Arrington is a 42 year old female  presenting for the evaluation of elevated LFT's.     LFT's were first noticed to be abnormal in January 2022 and have slowly trended up over the past year.     Appetite is normal. Not a much meat eater. Making things from scratch. Trying to do more meal prep. Previously was eating chicken strips. For the past year, was eating out more. Was doing some meal prep delivery services.     Slow steady increase of weight over the past few years. Had hysterectomy this summer, was less active. Currently training for a 5K in March. Have an elipitical at home  (1 time a week, goal is 3 times a week) .     Patient denies jaundice, lower extremity edema, abdominal distension or confusion.      Patient also denies melena, hematochezia or hematemesis.    Patient denies weight loss, fevers, sweats or chills.    PMH:    has a past medical history of Cyst of left ovary (01/12/2016), Endometriosis (10/6/2022), Intramural leiomyoma of uterus (1/12/2016), Iron deficiency anemia due to chronic blood loss (3/7/2022), Menorrhagia with regular cycle (1/17/2022), and none.     SMH:    has a past surgical history that includes no surgical history; colonoscopy (02/18/2022); and Hysterectomy supracervical (Bilateral, 7/1/2022).     Medications:   - AG1 - green drink - take recommend morning drink  - Will be resuming Fish Oil     No previous tobacco use. May have 1-2 drinks on the weekend. No history of significant alcohol use.  No previous IV/IN drug use.  Currently works at a [employer] at [place]. Patient currently lives with partner Juan Luis. No known family history of liver disease or cancer.     Previous work-up:  HCV antibody nonreactive  HBV SAb 0.63 - Not immune  HBV SAg: nonreactive  HBV CAb: nonreactive  HUMAIRA   F-actin  AMA   Iron panel   Ferritin 125   Iron Sats: 24%   TTG    Alpha-1-antitrypsin  Ceruloplasmin   TSH 2.73   Cholesterol Total 252  HDL 44  LDL  Triglycerides  Hemoglobin A1c 5.5          Allergies:   No Known Allergies         Social History:     Social History     Socioeconomic History     Marital status: Single     Spouse name: Not on file     Number of children: Not on file     Years of education: Not on file     Highest education level: Not on file   Occupational History     Not on file   Tobacco Use     Smoking status: Former     Packs/day: 0.50     Years: 11.00     Pack years: 5.50     Types: Cigarettes     Start date: 1999     Quit date: 11/15/2009     Years since quittin.2     Smokeless tobacco: Never   Vaping Use     Vaping Use: Never used   Substance and Sexual Activity     Alcohol use: Yes     Comment: 0-1 drink weekly     Drug use: No     Sexual activity: Not Currently     Partners: Male     Birth control/protection: None   Other Topics Concern     Parent/sibling w/ CABG, MI or angioplasty before 65F 55M? No   Social History Narrative    2023: same as before .  Lives with , 3 cats, works at the [employer] in [employer].  Mom  from esophageal cancer.  Dad with history of colonic polyps.  Has 2 sisters 1 in [place] where she grew up and another in [facility].  Training to do a 5K from couch to 5K program with one of her sisters.  We will be running it in 2 months        2022: same as below         2022: lives with male partner, 20 yrs together, three cats, works at [employer] in the [employer]      Social Determinants of Health     Financial Resource Strain: Not on file   Food Insecurity: Not on file   Transportation Needs: Not on file   Physical Activity: Not on file   Stress: Not on file   Social Connections: Not on file   Intimate Partner Violence: Not on file   Housing Stability: Not on file            Family History:     Family History   Problem Relation Age of Onset     Esophageal Cancer Mother 55         esophageal cancer     Blood Disease Mother         factor 5     Diabetes Father         type 2     Respiratory Father         emphasema     Colon Polyps Father         had colon removed     Diabetes Paternal Grandmother             Review of Systems:   Gen: See HPI     HEENT: No change in vision or hearing, mouth sores, dysphagia, lymph nodes  Resp: No shortness of breath, coughing, hx of asthma  CV: No chest pain, palpitations, syncope   GI: See HPI  : No dysuria, history of stones, urine color    Skin: No rash; no pruritus or psoriasis  MS: No arthralgias, myalgias, joint swelling  Neuro: No memory changes, confusion, numbness    Heme: No difficulty clotting, bruising, bleeding  Psych:  No anxiety, depression, agitation          Physical Exam:   /71   Pulse 75   Temp 98.8  F (37.1  C) (Oral)   Wt 87.5 kg (192 lb 12.8 oz)   LMP 03/12/2022 (Exact Date)   SpO2 96%   BMI 33.20 kg/m      Gen: A&Ox3, NAD, well developed  HEENT: non-icteric   CV: RRR, no overt murmurs  Lung: CTA Bilatererally, no wheezing or crackles.   Lym- no palpable lymphadenopathy  Abd: soft, NT, ND,  no palpable splenomegaly, liver is not palpable.   Ext: no edema, intact pulses.   Skin: No rash, no palmar erythema, telangiectasias or jaundice  Neuro: grossly intact, no asterixis   Psych: appropriate mood and affects         Data:   Reviewed in person and significant for:    Lab Results   Component Value Date     02/10/2023      Lab Results   Component Value Date    POTASSIUM 4.2 02/10/2023    POTASSIUM 3.9 10/06/2022     Lab Results   Component Value Date    CHLORIDE 99 02/10/2023    CHLORIDE 106 10/06/2022     Lab Results   Component Value Date    CO2 25 02/10/2023    CO2 25 10/06/2022     Lab Results   Component Value Date    BUN 10.8 02/10/2023    BUN 8 10/06/2022     Lab Results   Component Value Date    CR 0.73 02/10/2023       Lab Results   Component Value Date    WBC 7.7 02/10/2023    WBC 10.0 12/01/2015     Lab Results    Component Value Date    HGB 14.9 02/10/2023    HGB 12.3 12/01/2015     Lab Results   Component Value Date    HCT 44.2 02/10/2023    HCT 38.1 12/01/2015     Lab Results   Component Value Date    MCV 86 02/10/2023    MCV 85 12/01/2015     Lab Results   Component Value Date     02/10/2023     12/01/2015       Lab Results   Component Value Date    AST 64 02/10/2023     Lab Results   Component Value Date     02/10/2023     No results found for: BILICONJ   Lab Results   Component Value Date    BILITOTAL 0.7 02/10/2023       Lab Results   Component Value Date    ALBUMIN 4.7 02/10/2023    ALBUMIN 3.8 10/06/2022     Lab Results   Component Value Date    PROTTOTAL 8.1 02/10/2023      Lab Results   Component Value Date    ALKPHOS 89 02/10/2023       Lab Results   Component Value Date    INR 0.99 02/10/2023         Imaging:        EXAM: US ABDOMEN LIMITED  LOCATION: [facility]  DATE/TIME: 10/21/2022 10:42 AM     INDICATION: Transaminitis.  COMPARISON: Not available.  TECHNIQUE: Limited abdominal ultrasound.     FINDINGS:     GALLBLADDER: Cholelithiasis. Gallbladder is normal in size, without wall thickening, pericholecystic fluid, or positive Craig's sign.     BILE DUCTS: No biliary dilatation. The common duct measures 5 mm.     LIVER: Hepatic steatosis.      RIGHT KIDNEY: No hydronephrosis.     PANCREAS: The visualized portions are normal.     Spleen and left kidney are unremarkable.                                                                      IMPRESSION:  1.  Cholelithiasis, without sonographic evidence of acute cholecystitis.  2.  Hepatic steatosis.    CT ABDOMEN PELVIS W   9/14/2018 5:15 PM     INDICATION: Lower Abdominal Pain Pelvic Pain   TECHNIQUE: CT abdomen and pelvis. Multiplanar reformation images (MPR). Dose   reduction techniques were used.   IV CONTRAST: Omnipaque 350 100ml   COMPARISON: None.     FINDINGS:   LUNG BASES: Negative.     ABDOMEN: 7 mm too small  to characterize low-attenuation lesion within segment 8   of the liver, otherwise the liver is unremarkable. The spleen, pancreas,   gallbladder, adrenal glands, and kidneys are within normal limits.     PELVIS: Bladder and bowel is within normal limits. There is a large   multilobulated mass which appears to be arising off the left aspect of the   uterus measuring approximately 8.6 x 5.8 cm x 7.  The base appears to be   approximately 3.3 cm. Several additional fibroids are noted throughout the   uterus which obscures the endometrial stripe. The largest intramural fibroid   measures approximately 6.7 cm. No surrounding adenopathy.     MUSCULOSKELETAL: Negative.     CONCLUSION:   1.  Large multilobulated mass which appears to be arising off the uterus   measuring approximately 8.6 x 5.8 x 7 cm with a base measuring approximately 3.3   cm. Findings likely present a large exophytic subserosal fibroid. Recommend   correlation with previous imaging. Malignancy cannot be entirely excluded. If   this was not present on previous imaging, further workup is suggested. By report   patient had a previous ultrasound approximately one year ago and was to have a   follow-up ultrasound next week. Multiple additional fibroids are noted   throughout the uterus.

## 2023-02-13 NOTE — PATIENT INSTRUCTIONS
Physical Activity   - Increase physical activity level by more than 60 minutes/week   - Maintain physical activity more than 150 minutes a week     Limit Carbohydrates In Diet:   What foods are Carbohydrates?    - Main groups: Grains, Fruits, Starchy Vegetables (corn, potatoes, peas, beans) and Sweets/Desserts    When you do have carbohydrates - be mindful of the portions.   - Have ones with more fiber in the them (brown rice, whole grain breads/pastas)   - Fruits - choose ones with edible skins (apples, pears, etc) or seeds (berries)

## 2023-02-13 NOTE — NURSING NOTE
Chief Complaint   Patient presents with     New Patient       /71   Pulse 75   Temp 98.8  F (37.1  C) (Oral)   Wt 87.5 kg (192 lb 12.8 oz)   LMP 03/12/2022 (Exact Date)   SpO2 96%   BMI 33.20 kg/m      Brent Eli on 2/13/2023 at 10:19 AM

## 2023-02-14 DIAGNOSIS — K76.0 HEPATIC STEATOSIS: ICD-10-CM

## 2023-02-14 PROCEDURE — 91200 LIVER ELASTOGRAPHY: CPT | Mod: 26 | Performed by: PHYSICIAN ASSISTANT

## 2023-08-24 ENCOUNTER — LAB (OUTPATIENT)
Dept: LAB | Facility: CLINIC | Age: 43
End: 2023-08-24
Payer: COMMERCIAL

## 2023-08-24 DIAGNOSIS — K76.0 HEPATIC STEATOSIS: ICD-10-CM

## 2023-08-24 LAB
ALBUMIN SERPL BCG-MCNC: 4.4 G/DL (ref 3.5–5.2)
ALP SERPL-CCNC: 98 U/L (ref 35–104)
ALT SERPL W P-5'-P-CCNC: 129 U/L (ref 0–50)
AST SERPL W P-5'-P-CCNC: 77 U/L (ref 0–45)
BILIRUB DIRECT SERPL-MCNC: <0.2 MG/DL (ref 0–0.3)
BILIRUB SERPL-MCNC: 0.5 MG/DL
PROT SERPL-MCNC: 7.6 G/DL (ref 6.4–8.3)

## 2023-08-24 PROCEDURE — 36415 COLL VENOUS BLD VENIPUNCTURE: CPT

## 2023-08-24 PROCEDURE — 99000 SPECIMEN HANDLING OFFICE-LAB: CPT

## 2023-08-24 PROCEDURE — 80076 HEPATIC FUNCTION PANEL: CPT

## 2023-08-24 PROCEDURE — 81332 SERPINA1 GENE: CPT | Mod: 90

## 2023-08-24 PROCEDURE — 82103 ALPHA-1-ANTITRYPSIN TOTAL: CPT | Mod: 90

## 2023-08-24 PROCEDURE — 86038 ANTINUCLEAR ANTIBODIES: CPT

## 2023-08-24 PROCEDURE — 82390 ASSAY OF CERULOPLASMIN: CPT

## 2023-08-24 PROCEDURE — 83516 IMMUNOASSAY NONANTIBODY: CPT | Mod: 90

## 2023-08-25 LAB — ANA SER QL IF: NEGATIVE

## 2023-08-26 LAB — SMA IGG SER-ACNC: 14 UNITS

## 2023-08-28 LAB — CERULOPLASMIN SERPL-MCNC: 25 MG/DL (ref 20–60)

## 2023-08-30 LAB
A1AT PHENOTYP SERPL-IMP: NORMAL
A1AT SERPL-MCNC: 130 MG/DL
A1AT SS SERPL-MCNC: NEGATIVE G/L
A1AT SZ SERPL-MCNC: NORMAL G/L
A1AT ZZ SERPL-MCNC: NEGATIVE G/L
SPECIMEN SOURCE: NORMAL

## 2023-09-07 ENCOUNTER — TELEPHONE (OUTPATIENT)
Dept: GASTROENTEROLOGY | Facility: CLINIC | Age: 43
End: 2023-09-07
Payer: COMMERCIAL

## 2023-09-14 ENCOUNTER — TELEPHONE (OUTPATIENT)
Dept: GASTROENTEROLOGY | Facility: CLINIC | Age: 43
End: 2023-09-14
Payer: COMMERCIAL

## 2023-10-25 ENCOUNTER — TELEPHONE (OUTPATIENT)
Dept: GASTROENTEROLOGY | Facility: CLINIC | Age: 43
End: 2023-10-25
Payer: COMMERCIAL

## 2023-10-28 ENCOUNTER — IMMUNIZATION (OUTPATIENT)
Dept: FAMILY MEDICINE | Facility: CLINIC | Age: 43
End: 2023-10-28
Payer: COMMERCIAL

## 2023-10-28 PROCEDURE — 90471 IMMUNIZATION ADMIN: CPT

## 2023-10-28 PROCEDURE — 90480 ADMN SARSCOV2 VAC 1/ONLY CMP: CPT

## 2023-10-28 PROCEDURE — 91320 SARSCV2 VAC 30MCG TRS-SUC IM: CPT

## 2023-10-28 PROCEDURE — 90686 IIV4 VACC NO PRSV 0.5 ML IM: CPT

## 2024-01-11 DIAGNOSIS — R79.89 ELEVATED LFTS: ICD-10-CM

## 2024-01-11 DIAGNOSIS — K76.0 HEPATIC STEATOSIS: Primary | ICD-10-CM

## 2024-01-11 NOTE — PROGRESS NOTES
Sera Francisco,    This patient is on the [facility] lab for 01/29 and will need some labs ordered. Please advise / place orders.      Thanks,  Idania.

## 2024-01-29 ENCOUNTER — LAB (OUTPATIENT)
Dept: LAB | Facility: CLINIC | Age: 44
End: 2024-01-29
Payer: COMMERCIAL

## 2024-01-29 DIAGNOSIS — R79.89 ELEVATED LFTS: ICD-10-CM

## 2024-01-29 DIAGNOSIS — K76.0 HEPATIC STEATOSIS: ICD-10-CM

## 2024-01-29 LAB
ALBUMIN SERPL BCG-MCNC: 4.5 G/DL (ref 3.5–5.2)
ALP SERPL-CCNC: 99 U/L (ref 40–150)
ALT SERPL W P-5'-P-CCNC: 106 U/L (ref 0–50)
ANION GAP SERPL CALCULATED.3IONS-SCNC: 10 MMOL/L (ref 7–15)
AST SERPL W P-5'-P-CCNC: 49 U/L (ref 0–45)
BILIRUB DIRECT SERPL-MCNC: <0.2 MG/DL (ref 0–0.3)
BILIRUB SERPL-MCNC: 0.5 MG/DL
BUN SERPL-MCNC: 14.1 MG/DL (ref 6–20)
CALCIUM SERPL-MCNC: 10.2 MG/DL (ref 8.6–10)
CHLORIDE SERPL-SCNC: 104 MMOL/L (ref 98–107)
CREAT SERPL-MCNC: 0.73 MG/DL (ref 0.51–0.95)
DEPRECATED HCO3 PLAS-SCNC: 24 MMOL/L (ref 22–29)
EGFRCR SERPLBLD CKD-EPI 2021: >90 ML/MIN/1.73M2
ERYTHROCYTE [DISTWIDTH] IN BLOOD BY AUTOMATED COUNT: 12.8 % (ref 10–15)
GLUCOSE SERPL-MCNC: 114 MG/DL (ref 70–99)
HCT VFR BLD AUTO: 43.6 % (ref 35–47)
HGB BLD-MCNC: 14.4 G/DL (ref 11.7–15.7)
INR PPP: 1 (ref 0.85–1.15)
MCH RBC QN AUTO: 29 PG (ref 26.5–33)
MCHC RBC AUTO-ENTMCNC: 33 G/DL (ref 31.5–36.5)
MCV RBC AUTO: 88 FL (ref 78–100)
PLATELET # BLD AUTO: 268 10E3/UL (ref 150–450)
POTASSIUM SERPL-SCNC: 4.3 MMOL/L (ref 3.4–5.3)
PROT SERPL-MCNC: 7.6 G/DL (ref 6.4–8.3)
RBC # BLD AUTO: 4.96 10E6/UL (ref 3.8–5.2)
SODIUM SERPL-SCNC: 138 MMOL/L (ref 135–145)
WBC # BLD AUTO: 8.2 10E3/UL (ref 4–11)

## 2024-01-29 PROCEDURE — 85027 COMPLETE CBC AUTOMATED: CPT

## 2024-01-29 PROCEDURE — 82248 BILIRUBIN DIRECT: CPT

## 2024-01-29 PROCEDURE — 80053 COMPREHEN METABOLIC PANEL: CPT

## 2024-01-29 PROCEDURE — 36415 COLL VENOUS BLD VENIPUNCTURE: CPT

## 2024-01-29 PROCEDURE — 85610 PROTHROMBIN TIME: CPT

## 2024-02-05 ENCOUNTER — ANCILLARY PROCEDURE (OUTPATIENT)
Dept: MAMMOGRAPHY | Facility: CLINIC | Age: 44
End: 2024-02-05
Attending: FAMILY MEDICINE
Payer: COMMERCIAL

## 2024-02-05 DIAGNOSIS — Z12.31 VISIT FOR SCREENING MAMMOGRAM: ICD-10-CM

## 2024-02-05 PROCEDURE — 77067 SCR MAMMO BI INCL CAD: CPT | Mod: TC | Performed by: RADIOLOGY

## 2024-02-05 SDOH — HEALTH STABILITY: PHYSICAL HEALTH: ON AVERAGE, HOW MANY MINUTES DO YOU ENGAGE IN EXERCISE AT THIS LEVEL?: 30 MIN

## 2024-02-05 SDOH — HEALTH STABILITY: PHYSICAL HEALTH: ON AVERAGE, HOW MANY DAYS PER WEEK DO YOU ENGAGE IN MODERATE TO STRENUOUS EXERCISE (LIKE A BRISK WALK)?: 2 DAYS

## 2024-02-05 ASSESSMENT — SOCIAL DETERMINANTS OF HEALTH (SDOH): HOW OFTEN DO YOU GET TOGETHER WITH FRIENDS OR RELATIVES?: ONCE A WEEK

## 2024-02-07 ENCOUNTER — VIRTUAL VISIT (OUTPATIENT)
Dept: GASTROENTEROLOGY | Facility: CLINIC | Age: 44
End: 2024-02-07
Attending: PHYSICIAN ASSISTANT
Payer: COMMERCIAL

## 2024-02-07 DIAGNOSIS — K76.0 NAFLD (NONALCOHOLIC FATTY LIVER DISEASE): Primary | Chronic | ICD-10-CM

## 2024-02-07 PROCEDURE — 99214 OFFICE O/P EST MOD 30 MIN: CPT | Mod: 95 | Performed by: PHYSICIAN ASSISTANT

## 2024-02-07 NOTE — LETTER
2/7/2024         RE: Arabella Arrington  [address] [place] [address]        Dear Colleague,    Thank you for referring your patient, Arabella Arrington, to the [facility] [place]. Please see a copy of my visit note below.    Arabella is a 43 year old who is being evaluated via a billable video visit.    Video-Visit Details    Type of service:  Video Visit   Joined the call at 2/7/2024, 4:15:47 pm.  Left the call at 2/7/2024, 4:37:01 pm.  Originating Location (pt. Location): Home    Distant Location (provider location):  Off-site  Platform used for Video Visit: [facility]     Hepatology Follow-up Clinic note  Arabella Arrington   Date of Birth 1980      Reason for follow-up: NAFLD          Assessment/plan:   Arabella Arrington is a 43 year old female with ALT/AST and imaging findings of hepatic steatosis. Liver function also normal without stigmata of advanced liver disease. FibroScan in 2/2023 showing Stage 0-1, 3.3 kilopascals. Grade 3 steatosis    #Metabolic associated fatty liver disease (MALFD):   - Patient a few risk factors low risk . We reviewed the slow natural history of nonalcoholic fatty liver disease and cirrhosis.   - Recommend FIB-4 yearly with PCP. FIB-4 Calculation: 0.78   - Recommend slow gradual weight loss.   - Recommend on-going aggressive risk modification with yearly screening for cholesterol\, blood glucose and sleep apnea   - Maintain good control of cholesterol (No contraindication to starting a statin with LFT elevations)   - Optimize blood glucose as needed. Could consider GLP-1 inhibitor for both insulin resistance and help with weight loss  - Improve nutrition: continue limiting carbohydrates, especially simple carbohydrates, and following Mediterranean eating patten  - Increase physical activity as able, ideally 150 minutes weekly  - Limit alcohol intake to not more than 3 drinks a week    - Consider/recommend follow up with non-invasive elastography or  FibroScan in 5 years     Follow-up in clinic as needed    Akanksha Kunz PA-C   [facility] Hepatology clinic    Total time for E/M services performed on the date of the encounter 30 minutes.  This included review of previous: clinic visits, hospital records, lab results, imaging studies, and procedural documentation. Time also includes patient visit, documentation and discussion with other providers.  The findings from this review are summarized in the above note.     -----------------------------------------------------       HPI:   Arabella Arrington is a 43 year old female presenting for follow-up.     Dx: MAFLD   Risk factors: obesity, prediabetes, dyslipidemia      FibroScan: 2/14/2023, Stage 0-1, 3.3 kilopascals. Grade 3 steatosis   Bx:  no     Patient was last seen by me on 2/13/2023. No recent hospitalizations or ER visits.     Per chart review, weight is up about 3 lbs since last visit.     Appetite is fine. Trying to eat healthier, more veggies. Cooking at home. Eliptical (tends to work on Mondays). Get outside more.     Patient denies jaundice, lower extremity edema, abdominal distension or confusion.  Patient also denies melena, hematochezia or hematemesis. Patient denies weight loss, fevers, sweats or chills.    No history of significant alcohol use/ Works at [employer].     Previous work-up:   Lab Results   Component Value Date    HEPBANG Nonreactive 02/10/2023    HBCAB Nonreactive 02/10/2023    AUSAB 0.63 02/10/2023    HCVAB Nonreactive 02/10/2023    VINEET 125 02/10/2023    IRONSAT 24 02/10/2023    TTG 0.5 04/15/2022    TTGG <0.6 04/15/2022    CER 25 08/24/2023    FRANCESCO Negative 08/24/2023    SMOOTHMUS 14 08/24/2023    V1OLXUF Whole Blood 08/24/2023    A1A 130 08/24/2023    C5ESDOK Negative 08/24/2023    D2NZZDN Negative 08/24/2023    V7THRYEP Not Applicable 08/24/2023    X7NXIYFBV See Note 08/24/2023    TSH 2.73 02/10/2023    CHOL 252 (H) 02/10/2023    HDL 44 (L) 02/10/2023    LDL  147 (H) 02/10/2023    TRIG 307 (H) 02/10/2023    A1C 5.5 02/10/2023      Lab Results   Component Value Date    SPECDES  01/17/2022     Blood: ACD x2      LDRESULTS  01/17/2022       Factor V 1691G>A (Leiden)  RESULTS:  Mutation analyzed: 1691G>A  Factor V 1691G>A (Leiden)  Interpretation:  ABSENT  Factor V 1691G>A (Leiden) mutation  genotype:  G/G            Recent Labs   Lab Test 01/29/24  1016 08/24/23  0839 02/10/23  1118 10/06/22  1130 06/03/22  1348 01/17/22  1111   ALKPHOS 99 98 89 86 73 73   * 129* 118* 161* 71* 56*   AST 49* 77* 64* 98* 25 24   BILITOTAL 0.5 0.5 0.7 0.5 0.6 0.4        Medical hx Surgical hx   Past Medical History:   Diagnosis Date    Cyst of left ovary 01/12/2016    Endometriosis 10/6/2022    Intramural leiomyoma of uterus 1/12/2016    Iron deficiency anemia due to chronic blood loss 3/7/2022    Menorrhagia with regular cycle 1/17/2022    none     Past Surgical History:   Procedure Laterality Date    COLONOSCOPY  02/18/2022    HYSTERECTOMY SUPRACERVICAL Bilateral 7/1/2022    Procedure: Abdominal Supracervical Hysterectomy, Bilateral Salpingectomy, left oophorectomy, cystoscopy and pelvic washings.;  Surgeon: Kasandra Multani MD;  Location:  OR    no surgical history                   Medications:     No current outpatient medications on file.     No current facility-administered medications for this visit.            Allergies:   No Known Allergies         Review of Systems:   10 points ROS was obtained and highlighted in the HPI, otherwise negative.          Physical Exam:   LMP 03/12/2022 (Exact Date)     Gen- well, NAD, A+Ox3, normal color  Lym- no palpable LAD  Abd- soft, nontender  Extr- hands normal, no JOHN  Skin- no rash or jaundice  Neuro- no asterixis  Psych- normal mood           Data:   Reviewed in person and significant for:    Lab Results   Component Value Date     01/29/2024      Lab Results   Component Value Date    POTASSIUM 4.3 01/29/2024    POTASSIUM 3.9  "10/06/2022     Lab Results   Component Value Date    CHLORIDE 104 01/29/2024    CHLORIDE 106 10/06/2022     Lab Results   Component Value Date    CO2 24 01/29/2024    CO2 25 10/06/2022     Lab Results   Component Value Date    BUN 14.1 01/29/2024    BUN 8 10/06/2022     Lab Results   Component Value Date    CR 0.73 01/29/2024       Lab Results   Component Value Date    WBC 8.2 01/29/2024    WBC 10.0 12/01/2015     Lab Results   Component Value Date    HGB 14.4 01/29/2024    HGB 12.3 12/01/2015     Lab Results   Component Value Date    HCT 43.6 01/29/2024    HCT 38.1 12/01/2015     Lab Results   Component Value Date    MCV 88 01/29/2024    MCV 85 12/01/2015     Lab Results   Component Value Date     01/29/2024     12/01/2015       Lab Results   Component Value Date    AST 49 01/29/2024     Lab Results   Component Value Date     01/29/2024     No results found for: \"BILICONJ\"   Lab Results   Component Value Date    BILITOTAL 0.5 01/29/2024       Lab Results   Component Value Date    ALBUMIN 4.5 01/29/2024    ALBUMIN 3.8 10/06/2022     Lab Results   Component Value Date    PROTTOTAL 7.6 01/29/2024      Lab Results   Component Value Date    ALKPHOS 99 01/29/2024       Lab Results   Component Value Date    INR 1.00 01/29/2024       EXAM: US ABDOMEN LIMITED  LOCATION: [facility]  DATE/TIME: 10/21/2022 10:42 AM     INDICATION: Transaminitis.  COMPARISON: Not available.  TECHNIQUE: Limited abdominal ultrasound.     FINDINGS:     GALLBLADDER: Cholelithiasis. Gallbladder is normal in size, without wall thickening, pericholecystic fluid, or positive Craig's sign.     BILE DUCTS: No biliary dilatation. The common duct measures 5 mm.     LIVER: Hepatic steatosis.      RIGHT KIDNEY: No hydronephrosis.     PANCREAS: The visualized portions are normal.     Spleen and left kidney are unremarkable.                                                                      IMPRESSION:  1.  " Cholelithiasis, without sonographic evidence of acute cholecystitis.  2.  Hepatic steatosis.        Akanksha Kunz PA-C

## 2024-02-07 NOTE — PROGRESS NOTES
Arabella is a 43 year old who is being evaluated via a billable video visit.    Video-Visit Details    Type of service:  Video Visit   Joined the call at 2/7/2024, 4:15:47 pm.  Left the call at 2/7/2024, 4:37:01 pm.  Originating Location (pt. Location): Home    Distant Location (provider location):  Off-site  Platform used for Video Visit: [facility]     Hepatology Follow-up Clinic note  Arabella Arrington   Date of Birth 1980      Reason for follow-up: NAFLD          Assessment/plan:   Arabella Arrington is a 43 year old female with ALT/AST and imaging findings of hepatic steatosis. Liver function also normal without stigmata of advanced liver disease. FibroScan in 2/2023 showing Stage 0-1, 3.3 kilopascals. Grade 3 steatosis    #Metabolic associated fatty liver disease (MALFD):   - Patient a few risk factors low risk . We reviewed the slow natural history of nonalcoholic fatty liver disease and cirrhosis.   - Recommend FIB-4 yearly with PCP. FIB-4 Calculation: 0.78   - Recommend slow gradual weight loss.   - Recommend on-going aggressive risk modification with yearly screening for cholesterol\, blood glucose and sleep apnea   - Maintain good control of cholesterol (No contraindication to starting a statin with LFT elevations)   - Optimize blood glucose as needed. Could consider GLP-1 inhibitor for both insulin resistance and help with weight loss  - Improve nutrition: continue limiting carbohydrates, especially simple carbohydrates, and following Mediterranean eating patten  - Increase physical activity as able, ideally 150 minutes weekly  - Limit alcohol intake to not more than 3 drinks a week    - Consider/recommend follow up with non-invasive elastography or FibroScan in 5 years     Follow-up in clinic as needed    Akanksha Kunz PA-C   [facility]    Total time for E/M services performed on the date of the encounter 30 minutes.  This included review of previous: clinic visits, hospital  records, lab results, imaging studies, and procedural documentation. Time also includes patient visit, documentation and discussion with other providers.  The findings from this review are summarized in the above note.     -----------------------------------------------------       HPI:   Arabella Arrington is a 43 year old female presenting for follow-up.     Dx: MAFLD   Risk factors: obesity, prediabetes, dyslipidemia      FibroScan: 2/14/2023, Stage 0-1, 3.3 kilopascals. Grade 3 steatosis   Bx:  no     Patient was last seen by me on 2/13/2023. No recent hospitalizations or ER visits.     Per chart review, weight is up about 3 lbs since last visit.     Appetite is fine. Trying to eat healthier, more veggies. Cooking at home. Eliptical (tends to work on Mondays). Get outside more.     Patient denies jaundice, lower extremity edema, abdominal distension or confusion.  Patient also denies melena, hematochezia or hematemesis. Patient denies weight loss, fevers, sweats or chills.    No history of significant alcohol use/ Works at [employer].     Previous work-up:   Lab Results   Component Value Date    HEPBANG Nonreactive 02/10/2023    HBCAB Nonreactive 02/10/2023    AUSAB 0.63 02/10/2023    HCVAB Nonreactive 02/10/2023    VINEET 125 02/10/2023    IRONSAT 24 02/10/2023    TTG 0.5 04/15/2022    TTGG <0.6 04/15/2022    CER 25 08/24/2023    FRANCESCO Negative 08/24/2023    SMOOTHMUS 14 08/24/2023    I3NXZOC Whole Blood 08/24/2023    A1A 130 08/24/2023    F9IBVOZ Negative 08/24/2023    P4TIROH Negative 08/24/2023    W3ZZWKKI Not Applicable 08/24/2023    F7GRQHZEI See Note 08/24/2023    TSH 2.73 02/10/2023    CHOL 252 (H) 02/10/2023    HDL 44 (L) 02/10/2023     (H) 02/10/2023    TRIG 307 (H) 02/10/2023    A1C 5.5 02/10/2023      Lab Results   Component Value Date    SPECDES  01/17/2022     Blood: ACD x2      LDRESULTS  01/17/2022       Factor V 1691G>A (Leiden)  RESULTS:  Mutation analyzed: 1691G>A  Factor V 1691G>A  (Leiden)  Interpretation:  ABSENT  Factor V 1691G>A (Leiden) mutation  genotype:  G/G            Recent Labs   Lab Test 01/29/24  1016 08/24/23  0839 02/10/23  1118 10/06/22  1130 06/03/22  1348 01/17/22  1111   ALKPHOS 99 98 89 86 73 73   * 129* 118* 161* 71* 56*   AST 49* 77* 64* 98* 25 24   BILITOTAL 0.5 0.5 0.7 0.5 0.6 0.4        Medical hx Surgical hx   Past Medical History:   Diagnosis Date    Cyst of left ovary 01/12/2016    Endometriosis 10/6/2022    Intramural leiomyoma of uterus 1/12/2016    Iron deficiency anemia due to chronic blood loss 3/7/2022    Menorrhagia with regular cycle 1/17/2022    none     Past Surgical History:   Procedure Laterality Date    COLONOSCOPY  02/18/2022    HYSTERECTOMY SUPRACERVICAL Bilateral 7/1/2022    Procedure: Abdominal Supracervical Hysterectomy, Bilateral Salpingectomy, left oophorectomy, cystoscopy and pelvic washings.;  Surgeon: Kasandra Multani MD;  Location: [facility] OR    no surgical history                   Medications:     No current outpatient medications on file.     No current facility-administered medications for this visit.            Allergies:   No Known Allergies         Review of Systems:   10 points ROS was obtained and highlighted in the HPI, otherwise negative.          Physical Exam:   LMP 03/12/2022 (Exact Date)     Gen- well, NAD, A+Ox3, normal color  Lym- no palpable LAD  Abd- soft, nontender  Extr- hands normal, no JOHN  Skin- no rash or jaundice  Neuro- no asterixis  Psych- normal mood           Data:   Reviewed in person and significant for:    Lab Results   Component Value Date     01/29/2024      Lab Results   Component Value Date    POTASSIUM 4.3 01/29/2024    POTASSIUM 3.9 10/06/2022     Lab Results   Component Value Date    CHLORIDE 104 01/29/2024    CHLORIDE 106 10/06/2022     Lab Results   Component Value Date    CO2 24 01/29/2024    CO2 25 10/06/2022     Lab Results   Component Value Date    BUN 14.1 01/29/2024    BUN 8  "10/06/2022     Lab Results   Component Value Date    CR 0.73 01/29/2024       Lab Results   Component Value Date    WBC 8.2 01/29/2024    WBC 10.0 12/01/2015     Lab Results   Component Value Date    HGB 14.4 01/29/2024    HGB 12.3 12/01/2015     Lab Results   Component Value Date    HCT 43.6 01/29/2024    HCT 38.1 12/01/2015     Lab Results   Component Value Date    MCV 88 01/29/2024    MCV 85 12/01/2015     Lab Results   Component Value Date     01/29/2024     12/01/2015       Lab Results   Component Value Date    AST 49 01/29/2024     Lab Results   Component Value Date     01/29/2024     No results found for: \"BILICONJ\"   Lab Results   Component Value Date    BILITOTAL 0.5 01/29/2024       Lab Results   Component Value Date    ALBUMIN 4.5 01/29/2024    ALBUMIN 3.8 10/06/2022     Lab Results   Component Value Date    PROTTOTAL 7.6 01/29/2024      Lab Results   Component Value Date    ALKPHOS 99 01/29/2024       Lab Results   Component Value Date    INR 1.00 01/29/2024       EXAM: US ABDOMEN LIMITED  LOCATION: [facility]  DATE/TIME: 10/21/2022 10:42 AM     INDICATION: Transaminitis.  COMPARISON: Not available.  TECHNIQUE: Limited abdominal ultrasound.     FINDINGS:     GALLBLADDER: Cholelithiasis. Gallbladder is normal in size, without wall thickening, pericholecystic fluid, or positive Craig's sign.     BILE DUCTS: No biliary dilatation. The common duct measures 5 mm.     LIVER: Hepatic steatosis.      RIGHT KIDNEY: No hydronephrosis.     PANCREAS: The visualized portions are normal.     Spleen and left kidney are unremarkable.                                                                      IMPRESSION:  1.  Cholelithiasis, without sonographic evidence of acute cholecystitis.  2.  Hepatic steatosis.  "

## 2024-02-07 NOTE — NURSING NOTE
Is the patient currently in the state of [place]? YES    Visit mode:VIDEO    If the visit is dropped, the patient can be reconnected by: VIDEO VISIT: Send to e-mail at: [email or web address]    Will anyone else be joining the visit? NO  (If patient encounters technical issues they should call [phone])    How would you like to obtain your AVS? MyChart    Are changes needed to the allergy or medication list? No    Reason for visit: RECHECK    Nitin CLEARY

## 2024-02-12 ENCOUNTER — OFFICE VISIT (OUTPATIENT)
Dept: FAMILY MEDICINE | Facility: CLINIC | Age: 44
End: 2024-02-12
Payer: COMMERCIAL

## 2024-02-12 VITALS
RESPIRATION RATE: 18 BRPM | HEART RATE: 63 BPM | WEIGHT: 193.8 LBS | TEMPERATURE: 97 F | OXYGEN SATURATION: 100 % | DIASTOLIC BLOOD PRESSURE: 72 MMHG | HEIGHT: 64 IN | SYSTOLIC BLOOD PRESSURE: 118 MMHG | BODY MASS INDEX: 33.09 KG/M2

## 2024-02-12 DIAGNOSIS — E66.09 CLASS 1 OBESITY DUE TO EXCESS CALORIES WITH SERIOUS COMORBIDITY AND BODY MASS INDEX (BMI) OF 33.0 TO 33.9 IN ADULT: ICD-10-CM

## 2024-02-12 DIAGNOSIS — Z12.11 COLON CANCER SCREENING: ICD-10-CM

## 2024-02-12 DIAGNOSIS — E78.5 DYSLIPIDEMIA: ICD-10-CM

## 2024-02-12 DIAGNOSIS — M67.441 DIGITAL MUCINOUS CYST OF FINGER OF RIGHT HAND: ICD-10-CM

## 2024-02-12 DIAGNOSIS — Z83.2 FAMILY HISTORY OF FACTOR V LEIDEN MUTATION: ICD-10-CM

## 2024-02-12 DIAGNOSIS — R79.89 ELEVATED LFTS: ICD-10-CM

## 2024-02-12 DIAGNOSIS — Z23 NEED FOR DIPHTHERIA-TETANUS-PERTUSSIS (TDAP) VACCINE: ICD-10-CM

## 2024-02-12 DIAGNOSIS — Z80.0 FAMILY HISTORY OF ESOPHAGEAL CANCER: ICD-10-CM

## 2024-02-12 DIAGNOSIS — E83.52 SERUM CALCIUM ELEVATED: ICD-10-CM

## 2024-02-12 DIAGNOSIS — Z83.719 FAMILY HISTORY OF COLONIC POLYPS: ICD-10-CM

## 2024-02-12 DIAGNOSIS — Z71.89 ADVANCED DIRECTIVES, COUNSELING/DISCUSSION: ICD-10-CM

## 2024-02-12 DIAGNOSIS — E55.9 VITAMIN D DEFICIENCY: ICD-10-CM

## 2024-02-12 DIAGNOSIS — K76.0 NAFLD (NONALCOHOLIC FATTY LIVER DISEASE): ICD-10-CM

## 2024-02-12 DIAGNOSIS — Z13.0 SCREENING, ANEMIA, DEFICIENCY, IRON: ICD-10-CM

## 2024-02-12 DIAGNOSIS — Z00.00 ROUTINE HISTORY AND PHYSICAL EXAMINATION OF ADULT: Primary | ICD-10-CM

## 2024-02-12 DIAGNOSIS — E66.811 CLASS 1 OBESITY DUE TO EXCESS CALORIES WITH SERIOUS COMORBIDITY AND BODY MASS INDEX (BMI) OF 33.0 TO 33.9 IN ADULT: ICD-10-CM

## 2024-02-12 DIAGNOSIS — R06.83 SNORING: ICD-10-CM

## 2024-02-12 DIAGNOSIS — Z23 NEED FOR HEPATITIS A AND B VACCINATION: ICD-10-CM

## 2024-02-12 DIAGNOSIS — D22.9 MULTIPLE PIGMENTED NEVI: ICD-10-CM

## 2024-02-12 DIAGNOSIS — K80.20 CALCULUS OF GALLBLADDER WITHOUT CHOLECYSTITIS WITHOUT OBSTRUCTION: ICD-10-CM

## 2024-02-12 DIAGNOSIS — R73.03 PREDIABETES: ICD-10-CM

## 2024-02-12 DIAGNOSIS — Z90.711 H/O ABDOMINAL SUPRACERVICAL SUBTOTAL HYSTERECTOMY: ICD-10-CM

## 2024-02-12 DIAGNOSIS — Z00.00 HEALTH CARE MAINTENANCE: ICD-10-CM

## 2024-02-12 LAB
ALBUMIN SERPL BCG-MCNC: 4.5 G/DL (ref 3.5–5.2)
ALP SERPL-CCNC: 98 U/L (ref 40–150)
ALT SERPL W P-5'-P-CCNC: 129 U/L (ref 0–50)
ANION GAP SERPL CALCULATED.3IONS-SCNC: 10 MMOL/L (ref 7–15)
AST SERPL W P-5'-P-CCNC: 85 U/L (ref 0–45)
BILIRUB SERPL-MCNC: 0.6 MG/DL
BUN SERPL-MCNC: 9.5 MG/DL (ref 6–20)
CA-I BLD-MCNC: 5.3 MG/DL (ref 4.4–5.2)
CALCIUM SERPL-MCNC: 10.1 MG/DL (ref 8.6–10)
CHLORIDE SERPL-SCNC: 104 MMOL/L (ref 98–107)
CHOLEST SERPL-MCNC: 271 MG/DL
CREAT SERPL-MCNC: 0.8 MG/DL (ref 0.51–0.95)
DEPRECATED HCO3 PLAS-SCNC: 24 MMOL/L (ref 22–29)
EGFRCR SERPLBLD CKD-EPI 2021: >90 ML/MIN/1.73M2
FASTING STATUS PATIENT QL REPORTED: YES
FERRITIN SERPL-MCNC: 205 NG/ML (ref 6–175)
GLUCOSE SERPL-MCNC: 106 MG/DL (ref 70–99)
HBA1C MFR BLD: 5.6 % (ref 0–5.6)
HDLC SERPL-MCNC: 44 MG/DL
LDLC SERPL CALC-MCNC: 178 MG/DL
NONHDLC SERPL-MCNC: 227 MG/DL
POTASSIUM SERPL-SCNC: 4.2 MMOL/L (ref 3.4–5.3)
PROT SERPL-MCNC: 7.8 G/DL (ref 6.4–8.3)
PTH-INTACT SERPL-MCNC: 59 PG/ML (ref 15–65)
SODIUM SERPL-SCNC: 138 MMOL/L (ref 135–145)
TRIGL SERPL-MCNC: 246 MG/DL
TSH SERPL DL<=0.005 MIU/L-ACNC: 2.76 UIU/ML (ref 0.3–4.2)
VIT D+METAB SERPL-MCNC: 13 NG/ML (ref 20–50)

## 2024-02-12 PROCEDURE — 36415 COLL VENOUS BLD VENIPUNCTURE: CPT | Performed by: FAMILY MEDICINE

## 2024-02-12 PROCEDURE — 80053 COMPREHEN METABOLIC PANEL: CPT | Performed by: FAMILY MEDICINE

## 2024-02-12 PROCEDURE — 84443 ASSAY THYROID STIM HORMONE: CPT | Performed by: FAMILY MEDICINE

## 2024-02-12 PROCEDURE — 90472 IMMUNIZATION ADMIN EACH ADD: CPT | Performed by: FAMILY MEDICINE

## 2024-02-12 PROCEDURE — 80061 LIPID PANEL: CPT | Performed by: FAMILY MEDICINE

## 2024-02-12 PROCEDURE — 90636 HEP A/HEP B VACC ADULT IM: CPT | Performed by: FAMILY MEDICINE

## 2024-02-12 PROCEDURE — 83970 ASSAY OF PARATHORMONE: CPT | Performed by: FAMILY MEDICINE

## 2024-02-12 PROCEDURE — 82330 ASSAY OF CALCIUM: CPT | Performed by: FAMILY MEDICINE

## 2024-02-12 PROCEDURE — 99214 OFFICE O/P EST MOD 30 MIN: CPT | Mod: 25 | Performed by: FAMILY MEDICINE

## 2024-02-12 PROCEDURE — 99396 PREV VISIT EST AGE 40-64: CPT | Mod: 25 | Performed by: FAMILY MEDICINE

## 2024-02-12 PROCEDURE — 82728 ASSAY OF FERRITIN: CPT | Performed by: FAMILY MEDICINE

## 2024-02-12 PROCEDURE — 82306 VITAMIN D 25 HYDROXY: CPT | Performed by: FAMILY MEDICINE

## 2024-02-12 PROCEDURE — 83036 HEMOGLOBIN GLYCOSYLATED A1C: CPT | Performed by: FAMILY MEDICINE

## 2024-02-12 PROCEDURE — 90715 TDAP VACCINE 7 YRS/> IM: CPT | Performed by: FAMILY MEDICINE

## 2024-02-12 PROCEDURE — 90471 IMMUNIZATION ADMIN: CPT | Performed by: FAMILY MEDICINE

## 2024-02-12 ASSESSMENT — PAIN SCALES - GENERAL: PAINLEVEL: NO PAIN (0)

## 2024-02-12 NOTE — NURSING NOTE
Prior to immunization administration, verified patients identity using patient s name and date of birth. Please see Immunization Activity for additional information.     Screening Questionnaire for Adult Immunization    Are you sick today?   No   Do you have allergies to medications, food, a vaccine component or latex?   No   Have you ever had a serious reaction after receiving a vaccination?   No   Do you have a long-term health problem with heart, lung, kidney, or metabolic disease (e.g., diabetes), asthma, a blood disorder, no spleen, complement component deficiency, a cochlear implant, or a spinal fluid leak?  Are you on long-term aspirin therapy?   No   Do you have cancer, leukemia, HIV/AIDS, or any other immune system problem?   No   Do you have a parent, brother, or sister with an immune system problem?   No   In the past 3 months, have you taken medications that affect  your immune system, such as prednisone, other steroids, or anticancer drugs; drugs for the treatment of rheumatoid arthritis, Crohn s disease, or psoriasis; or have you had radiation treatments?   No   Have you had a seizure, or a brain or other nervous system problem?   No   During the past year, have you received a transfusion of blood or blood    products, or been given immune (gamma) globulin or antiviral drug?   No   For women: Are you pregnant or is there a chance you could become       pregnant during the next month?   No   Have you received any vaccinations in the past 4 weeks?   No     Immunization questionnaire answers were all negative.      Patient instructed to remain in clinic for 15 minutes afterwards, and to report any adverse reactions.     Screening performed by Edtih Quezada MA on 2/12/2024 at 11:05 AM.

## 2024-02-12 NOTE — PROGRESS NOTES
The below note was dictated using voice recognition. Although reviewed after completion, some word and grammatical error may remain .      Preventive Care Visit  [facility]  Kerrie Proctor MD, Family Medicine  Feb 12, 2024    Assessment & Plan     Routine history and physical examination of adult  Seen today for preventive physical and follow-up.  Breast exam normal continue self check regularly, mammogram done on 5 February showed dense breast which may miss tiny things, to continue with annual screening.  Pap HPV due 2027 given had a supracervical hysterectomy and cervix still in place.  History of prior endometrioma if should have worsening pain persistent pain vaginal bleeding then to contact gynecology.  Recent discomfort in January may have been related to that or could also have been due to constipation due to change in diet habits etc.  Colon cancer screening due again in 2027.  Does have family history of colon polyps in dad and may have had history of colon cancer maternal grandfather to clarify history and get back to us.  Consider  referral given family history of cancer if covered by insurance, defers for now  Healthcare maintenance reviewed.  Consider working on healthcare directives and honoring choices given to review.  Vaccines reviewed and given Tdap today.  Due for hepatitis A and B and Twinrix No. 1 given today, to get the second shot in 2 months and the third shot, 4 months after the second.  Fasting lab work today and will make further recommendations once those are reviewed.  -Ferritin (iron) level is normal.    BMI 33 with history of prediabetes, dyslipidemia,  metabolic liver disease & elevated LFTs.  Encouraged low carbohydrate diet increase physical activity and weight loss. Discussed weight loss medications reported not covered by insurance.  Offered nutritionist and referral to weight specialist will hold off for now and recommit to dietary and  lifestyle changes.  LDL( is elevated, HDL(good) cholesterol level is low and triglycerides are elevated which can increase heart disease risk.  The 10-year ASCVD risk score (Vipin DK, et al., 2019) is: low at 1.6%. A diet high in fat and simple carbohydrates, genetics and being overweight can contribute to this. ADVISE: exercising 150 minutes of aerobic exercise per week (30 minutes for 5 days per week or 50 minutes for 3 days per week are options), eating a low saturated fat/low carbohydrate diet, and omega-3 fatty acids (fish oil) 2065-8784 mg daily are helpful to improve this. In 6 months, to recheck your fasting cholesterol panel by scheduling a lab-only appointment. AST and ALT are elevated rest of liver tests normal. Normal kidney function and lytes. -TSH (thyroid stimulating hormone) level is normal which indicates normal thyroid function.    Offered referral to sleep specialist given history of snoring defers for now,  recommend sleeping on side and weight loss will help.    History of prediabetes we will check hemoglobin A1c today. Glucose is slight elevated but HBA1c is normal.No prediabetes currently    History of metabolic fatty liver disease.  Liver function normal without stigmata of advanced liver disease. FibroScan in 2/2023 showing Stage 0-1, 3.3 kilopascals. Grade 3 steatosis. Recently seen by hepatology & FIB-4 score of 0.78. will check yearly.   Recommend a slow gradual weight loss. On-going aggressive risk modification with yearly screening for cholesterol\, blood glucose and sleep apnea. No contraindication to starting a statin with LFT elevations if indicated. Optimize blood glucose as needed. Could consider GLP-1 inhibitor for both insulin resistance and help with weight loss ( reports checked and meds not covered). Encouraged to improve nutrition: continue limiting carbohydrates, especially simple carbohydrates, and following Mediterranean eating pattern, increase physical activity as able,  ideally 150 minutes weekly, limit alcohol intake to not more than 3 drinks a week ( reported barely drinks), & to follow up with non-invasive elastography or FibroScan in 5 years ( 2027)  & with Hepatology as needed.  Counseled against THC gummy use as it may also worsen calorie intake    Asymptomatic gallstones gradual weight loss will prevent these from flaring up.    Recent labs showed mildly elevated calcium may be artifact but will check vitamin D parathyroid to be sure. Calcium is elevated. Ionized calcium is elevated at 5.3. Parathyroid is normal vitamin D is low.. Recommend replacing vitamin D 5000 IU daily for 6 weeks and then 1000 IU daily to maintain levels. Then in 2-3 months,  to schedule a lab only appointment to recheck Vitamin D calcium etc levels.     Has a digital mucinous cyst right middle finger near nail fold that is currently not causing any pain or affecting functioning.  Monitor for now if gets rapidly bigger or painful or draining or red to contact us.  Monitor for skin/nail color changes    Multiple pigmented nevi, seen by dermatology 4/2022, a lesion removed nose returned, was benign, has a new 2 mm itchy brown raised mole ventral surface mid right wrist that looks benign under magnification.  Monitor for now and follow-up with dermatology in 2025 as discussed previously with them    Could consider referral to  given family history of esophageal cancer, possible colon cancer, colonic polyps.    Reviewed prior testing negative for factor V Leiden deficiency( +family history)    Return in 1 year for preventive physical and sooner in an office visit for any new concerns  - Ferritin; Future  - PRIMARY CARE FOLLOW-UP SCHEDULING; Future  - TDAP 7+ (ADACEL,BOOSTRIX)  - HEP A & B (TWINRIX)  - Ferritin    Class 1 obesity due to excess calories with serious comorbidity and body mass index (BMI) of 33.0 to 33.9 in adult  BMI 33 with history of prediabetes, dyslipidemia,  metabolic liver  disease & elevated LFTs.  Encouraged low carbohydrate diet increase physical activity and weight loss. Discussed weight loss medications reported not covered by insurance.  Offered nutritionist and referral to weight specialist will hold off for now and recommit to dietary and lifestyle changes.  LDL( is elevated, HDL(good) cholesterol level is low and triglycerides are elevated which can increase heart disease risk.  The 10-year ASCVD risk score (Vipin LOU, et al., 2019) is: low at 1.6%. A diet high in fat and simple carbohydrates, genetics and being overweight can contribute to this. ADVISE: exercising 150 minutes of aerobic exercise per week (30 minutes for 5 days per week or 50 minutes for 3 days per week are options), eating a low saturated fat/low carbohydrate diet, and omega-3 fatty acids (fish oil) 1083-1702 mg daily are helpful to improve this. In 6 months, to recheck your fasting cholesterol panel by scheduling a lab-only appointment. AST and ALT are elevated rest of liver tests normal. Normal kidney function and lytes. -TSH (thyroid stimulating hormone) level is normal which indicates normal thyroid function.  - TSH with free T4 reflex; Future  - Lipid panel reflex to direct LDL Fasting; Future  - Hemoglobin A1c; Future  - TSH with free T4 reflex  - Lipid panel reflex to direct LDL Fasting  - Hemoglobin A1c  - Lipid panel reflex to direct LDL Fasting; Future    Snoring  Offered referral to sleep specialist given history of snoring defers for now,  recommend sleeping on side and weight loss will help.    Prediabetes  History of prediabetes we will check hemoglobin A1c today. Glucose is slight elevated but HBA1c is normal.No prediabetes currently  - Comprehensive metabolic panel; Future  - TSH with free T4 reflex; Future  - Lipid panel reflex to direct LDL Fasting; Future  - Hemoglobin A1c; Future  - Comprehensive metabolic panel  - TSH with free T4 reflex  - Lipid panel reflex to direct LDL Fasting  -  Hemoglobin A1c  - Lipid panel reflex to direct LDL Fasting; Future    Dyslipidemia  Dietary and lifestyle changes and recheck in 6 months, if remains elevated consider a statin   - Lipid panel reflex to direct LDL Fasting; Future    NAFLD (nonalcoholic fatty liver disease)  History of metabolic fatty liver disease.  Liver function normal without stigmata of advanced liver disease. FibroScan in 2/2023 showing Stage 0-1, 3.3 kilopascals. Grade 3 steatosis. Recently seen by hepatology & FIB-4 score of 0.78. will check yearly.   Recommend a slow gradual weight loss. On-going aggressive risk modification with yearly screening for cholesterol\, blood glucose and sleep apnea. No contraindication to starting a statin with LFT elevations if indicated. Optimize blood glucose as needed. Could consider GLP-1 inhibitor for both insulin resistance and help with weight loss ( reports checked and meds not covered). Encouraged to improve nutrition: continue limiting carbohydrates, especially simple carbohydrates, and following Mediterranean eating pattern, increase physical activity as able, ideally 150 minutes weekly, limit alcohol intake to not more than 3 drinks a week ( reported barely drinks), & to follow up with non-invasive elastography or FibroScan in 5 years ( 2027)  & with Hepatology as needed.  Counseled against THC gummy use as it may also worsen calorie intake  - Comprehensive metabolic panel; Future  - TSH with free T4 reflex; Future  - Lipid panel reflex to direct LDL Fasting; Future  - Comprehensive metabolic panel  - TSH with free T4 reflex  - Lipid panel reflex to direct LDL Fasting  - Lipid panel reflex to direct LDL Fasting; Future    Elevated LFTs  - Comprehensive metabolic panel; Future  - TSH with free T4 reflex; Future  - Comprehensive metabolic panel  - TSH with free T4 reflex  - Lipid panel reflex to direct LDL Fasting; Future    Calculus of gallbladder without cholecystitis without  obstruction  Asymptomatic gallstones gradual weight loss will prevent these from flaring up.  - Comprehensive metabolic panel; Future  - Comprehensive metabolic panel    Serum calcium elevated  Recent labs showed mildly elevated calcium may be artifact but will check vitamin D parathyroid to be sure. Calcium is elevated. Ionized calcium is elevated at 5.3. Parathyroid is normal,  vitamin D is low.. Recommend replacing vitamin D 5000 IU daily for 6 weeks and then 1000 IU daily to maintain levels. Then in 2-3 months,  to schedule a lab only appointment to recheck Vitamin D calcium etc levels.   - Comprehensive metabolic panel; Future  - Ionized Calcium; Future  - Parathyroid Hormone Intact; Future  - Vitamin D Deficiency; Future  - Comprehensive metabolic panel  - Ionized Calcium  - Parathyroid Hormone Intact  - Vitamin D Deficiency  - Ionized Calcium; Future  - Parathyroid Hormone Intact; Future  - Vitamin D Deficiency; Future  - **Basic metabolic panel FUTURE 2mo; Future    Vitamin D deficiency  vitamin D is low.. Recommend replacing vitamin D 5000 IU daily for 6 weeks and then 1000 IU daily to maintain levels. Then in 2-3 months,  to schedule a lab only appointment to recheck Vitamin D calcium etc levels.   - Ionized Calcium; Future  - Parathyroid Hormone Intact; Future  - Vitamin D Deficiency; Future  - **Basic metabolic panel FUTURE 2mo; Future    Digital mucinous cyst of finger of right hand  Has a digital mucinous cyst right middle finger near nail fold that is currently not causing any pain or affecting functioning.  Monitor for now if gets rapidly bigger or painful or draining or red to contact us.  Monitor for skin/nail color changes    Multiple pigmented nevi  Multiple pigmented nevi, seen by dermatology 4/2022, a lesion removed nose returned, was benign, has a new 2 mm itchy brown raised mole ventral surface mid right wrist that looks benign under magnification.  Monitor for now and follow-up with  "dermatology in 2025 as discussed previously with them    Family history of colonic polyps  Family history of esophageal cancer  Could consider referral to  given family history of esophageal cancer, possible colon cancer, colonic polyps.    Family history of factor V Leiden mutation  Reviewed prior testing negative for factor V Leiden deficiency( +family history)    H/O abdominal supracervical subtotal hysterectomy  Pap due 2027    Health care maintenance  Return in 1 year for preventive physical and sooner in an office visit for any new concerns  - REVIEW OF HEALTH MAINTENANCE PROTOCOL ORDERS    Advanced directives, counseling/discussion    Need for diphtheria-tetanus-pertussis (Tdap) vaccine  - TDAP 7+ (ADACEL,BOOSTRIX)    Need for hepatitis A and B vaccination  Twinrix # 1 today, # 2 in 2 months # 3 four months after that.  - HEP A & B (TWINRIX)    Colon cancer screening  Due 2027     Screening, anemia, deficiency, iron  - Ferritin; Future  - Ferritin    Review of the result(s) of each unique test - diagnostics in epic  Independent interpretation of a test performed by another physician/other qualified health care professional (not separately reported) - hepatology, derm  Ordering of each unique test  80 minutes spent by me on the date of the encounter doing chart review, history and exam, documentation and further activities per the note      BMI  Estimated body mass index is 33.75 kg/m  as calculated from the following:    Height as of this encounter: 1.614 m (5' 3.54\").    Weight as of this encounter: 87.9 kg (193 lb 12.8 oz).   Weight management plan: Discussed healthy diet and exercise guidelines    Counseling  Appropriate preventive services were discussed with this patient, including applicable screening as appropriate for fall prevention, nutrition, physical activity, Tobacco-use cessation, weight loss and cognition.  Checklist reviewing preventive services available has been given to the " patient.    Patient has been advised of split billing requirements and indicates understanding: Yes    Work on weight loss  Regular exercise  See Patient Instructions        Subjective   Arabella is a 43 year old, presenting for the following:  Physical        2/12/2024     9:50 AM   Additional Questions   Roomed by Alena   Accompanied by Self         2/12/2024     9:50 AM   Patient Reported Additional Medications   Patient reports taking the following new medications None        Health Care Directive  Patient does not have a Health Care Directive or Living Will: Discussed advance care planning with patient; information given to patient to review.    HPI        2/5/2024   General Health   How would you rate your overall physical health? Good   Feel stress (tense, anxious, or unable to sleep) Only a little   (!) STRESS CONCERN      2/5/2024   Nutrition   Three or more servings of calcium each day? (!) I DON'T KNOW   Diet: Regular (no restrictions)   How many servings of fruit and vegetables per day? 4 or more   How many sweetened beverages each day? 0-1         2/5/2024   Exercise   Days per week of moderate/strenous exercise 2 days   Average minutes spent exercising at this level 30 min   (!) EXERCISE CONCERN      2/5/2024   Social Factors   Frequency of gathering with friends or relatives Once a week   Worry food won't last until get money to buy more No   Food not last or not have enough money for food? No   Do you have housing?  Yes   Are you worried about losing your housing? No   Lack of transportation? No   Unable to get utilities (heat,electricity)? No         2/5/2024   Dental   Dentist two times every year? Yes         2/5/2024   TB Screening   Were you born outside of [place]?  No         Today's PHQ-2 Score:       2/12/2024     9:38 AM   PHQ-2 ( 1999 Pfizer)   Q1: Little interest or pleasure in doing things 0   Q2: Feeling down, depressed or hopeless 0   PHQ-2 Score 0   Q1: Little interest or pleasure in doing  things Not at all   Q2: Feeling down, depressed or hopeless Not at all   PHQ-2 Score 0           2024   Substance Use   Alcohol more than 3/day or more than 7/wk No   Do you use any other substances recreationally? (!) ALCOHOL    (!) CANNABIS PRODUCTS     Social History     Tobacco Use    Smoking status: Former     Packs/day: 0.50     Years: 11.00     Additional pack years: 0.00     Total pack years: 5.50     Types: Cigarettes     Start date: 1999     Quit date: 11/15/2009     Years since quittin.2     Passive exposure: Past    Smokeless tobacco: Never   Vaping Use    Vaping Use: Never used   Substance Use Topics    Alcohol use: Yes     Comment: 0-1 drink weekly    Drug use: No     Comment: thc gummies             2024   Breast Cancer Screening   Family history of breast, colon, or ovarian cancer? No / Unknown         2022   LAST FHS-7 RESULTS   1st degree relative breast or ovarian cancer No   Any relative bilateral breast cancer No   Any male have breast cancer No   Any woman have breast and ovarian cancer No   Any woman with breast cancer before 50 yrs No   2 or more relatives with breast and/or ovarian cancer No   2 or more relatives with breast and/or bowel cancer No        Mammogram Screening - Mammogram every 1-2 years updated in Health Maintenance based on mutual decision making        2024   STI Screening   New sexual partner(s) since last STI/HIV test? No     History of abnormal Pap smear: NO - age 30-65 PAP every 5 years with negative HPV co-testing recommended  Last 3 Pap and HPV Results:       Latest Ref Rng & Units 3/7/2022     1:16 PM 2015    12:00 AM 3/13/2014    12:00 AM   PAP / HPV   PAP  Negative for Intraepithelial Lesion or Malignancy (NILM)      PAP (Historical)   NIL  NIL    HPV 16 DNA Negative Negative      HPV 18 DNA Negative Negative      Other HR HPV Negative Negative              Latest Ref Rng & Units 3/7/2022     1:16 PM 2015    12:00 AM 3/13/2014     12:00 AM   PAP / HPV   PAP  Negative for Intraepithelial Lesion or Malignancy (NILM)      PAP (Historical)   NIL  NIL    HPV 16 DNA Negative Negative      HPV 18 DNA Negative Negative      Other HR HPV Negative Negative        The 10-year ASCVD risk score (Vipin LOU, et al., 2019) is: 1.6%    Values used to calculate the score:      Age: 43 years      Sex: Female      Is Non- : No      Diabetic: No      Tobacco smoker: No      Systolic Blood Pressure: 118 mmHg      Is BP treated: No      HDL Cholesterol: 44 mg/dL      Total Cholesterol: 271 mg/dL       Reviewed and updated as needed this visit by Provider   Tobacco  Allergies  Meds  Problems  Med Hx  Surg Hx  Fam Hx  Soc   Hx Sexual Activity          CURRENTLY   Here for preventive physical and follow-up from last visit.  No breast issues, few zits on it ,   No fh of breast or ovarian cancer  Mammogram normal  2/5/24 , dense breasts  Pap HPV due in 2027 given had a supracervical hysterectomy and cervix still in place.  History of prior endometrioma removal along with fibroids . Had one incident of strong pain while watching sisters children in jan hard to walk, no bleeding. ? Due to eating diff ? Constipation. No abnormal bleeding, reports still gets minimal spotting during period time, here and there as told all tissue could not be removed. Declines pelvic u/s at this time.   ? Mat gf with colon cancer to confirm, Father had colon polyps. Colonoscopy 2022 normal. Due 2027.   Hold off on a  consult.   Healthcare maintenance reviewed.  No ACP on file, given honoring choices to review  Vaccines for COVID, flu  up-to-date.  Consider hepatitis B vaccination& hep a as not utd & not immune to hep noted 2023. Will do Twinrix #1 today.  Tdap due  Fasting for labs today and will make further recommendations once those are reviewed.    BMI 33, With prediabetes, NAFLD, mild snoring, dyslipidemia, elevated LFTs,   Imaging findings of  hepatic steatosis. Liver function normal without stigmata of advanced liver disease. FibroScan in 2023 showing Stage 0-1, 3.3 kilopascals. Grade 3 steatosis  FIB-4 0.78   Was recommend a slow gradual weight loss. On-going aggressive risk modification with yearly screening for cholesterol\, blood glucose and sleep apnea. No contraindication to starting a statin with LFT elevations. Optimize blood glucose as needed. Could consider GLP-1 inhibitor for both insulin resistance and help with weight loss ( reports checked and meds not covered)   Encouraged to improve nutrition: continue limiting carbohydrates, especially simple carbohydrates, and following Mediterranean eating pattern, increase physical activity as able, ideally 150 minutes weekly, limit alcohol intake to not more than 3 drinks a week ( reported barely drinks& to follow up with non-invasive elastography or FibroScan in 5 years ( )  & with Hepatology as needed.   Just met with hepatology virtually. Cbc,inr & LFTs reviewed.     Asymptomatic gallstones    Elevated calcium on recent GI labs, agreeable to checking with additional labs today.     Right middle finger has a non tender lump near nail. Not growing, not painful, not affecting functioning. Is right hand dominant. Feels not related to holding a pen, job is mostly typing. Fh of arthritis. No finger deformity. Opts to just monitor for now.    Multiple pigmented nevi. Benign lesion removed from nose 2022, recurred. Advised by derm to recheck skin in 3 yrs ()     Fh of ? Colon cancer/ fh of esophageal cancer,opts out  for now    Does not have factor V Leiden deficiency on testing ( + family history).     BACKGROUND  43 yr old, , B+, former smoker, BMI more than 33, snoring, prediabetes, dyslipidemia, nonalcoholic fatty liver disease, elevated LFTs AST ALT seen by hepatologist, fibrosis scan  was stage 0-I with grade 3 steatosis, asymptomatic gallstones, elevated calcium,  vitamin D deficiency, right dorsal radial nail for site of right middle finger digital mucinous cyst half centimeter asymptomatic, multiple pigmented nevi under care of dermatology last seen them 2022, history of abdominal supracervical subtotal hysterectomy for intramural leiomyomas left ovarian cyst abnormal uterine bleeding and prior menorrhagia with regular cycles resulting in iron deficiency anemia, family history of colonic polyps questionable colon cancer, dad with a colectomy history due to polyps and/serrated adenoma, and maternal grandfather with possible colon cancer, mother with history of esophageal cancer, colonoscopy in 2022 and self was normal advise recheck every 5 years due again in 2027, family history of factor V Leiden mutation but negative in self,  opted to defer  consult, previously under care of Barbara Ha, seen by GYN  in 2018 for fibroids following episode of acute pain that made her seek ER care & ct scan ordered. Was told fibroids were unlikely cause of the acute severe pain at that time. Options discussed and was to think about it and get back to gynecology at that time.  [place]  negative.    Seen first time by this provider on 1/17/22 for preventive health and additional concerns. Discussed self breast check regularly. To consider referral to  given fh of esophageal cancer , colon polyps, factor 5 if insurance would cover. Mammogram due and referral placed. Was to return for a Pelvic / PAP when not on her period. Healthcare maintenance reviewed. To consider working on healthcare directives, & honoring choices given. Noted COVID-vaccine including booster and flu shot up-to-date. Labs done. BMI 32:  Encouraged a healthy diet, frequent small meals, less carbohydrates more Mediterranean-style.  To try to lose at least 10% of total body weight over time to be healthier.  After the visit hemoglobin A1c came back elevated at 5.7 suggesting prediabetes,  recommended rechecking in 6 months to 1 year. Due to prior left ovarian cyst, fibroids of uterus and heavy periods, a repeat pelvic ultrasound was ordered. After the visit hemoglobin came back showing anemia with hemoglobin 9.8 and MCH MCV suggestive of iron deficiency, iron and ferritin came back low with ferritin of 9 & iron of 16, suspected from heavy periods.  Was asymptomatic.  Recommended to increase iron in diet, consider taking over-the-counter iron daily and recheck in 3 months with CBC, iron, ferritin, B12, reticulocyte & peripheral smear.  For mole on nose and on right leg and multiple pigmented nevi on skin of face, neck, back, chest, abdomen & extremities,  referred to dermatology for skin check and mole removal. Discussed family history of esophageal cancer and colonic polyps.  Advised to Increase fiber in diet and referred to GI for screening colonoscopy.  Was to consider a  consultation. Family history of factor V Leiden mutation  & testing done after genetic form signed as could help make recommendations in the future with regard to heavy periods and hormonal treatment.  If positive for clotting disorder would avoid estrogen containing compounds.  Labs came back showing a mildly elevated LDL with ASCVD risk of 2%, CMP normal TSH normal ferritin 9 iron 16, hemoglobin 9.8, AST minimally elevated, glucose minimally elevated, hemoglobin A1c 5.7. Mammogram done 1/28 was normal. Pelvic ultrasound on 2/8/2022 showed fibroid filled uterus ovaries not visible and referred to gynecology. Seen by gynecology virtually on 2/11/2022 and advised an MRI scheduled for 12 March and an EMB scheduled for the 22nd and was considering a hysterectomy given had no plans to start a family.   Colonoscopy done 2/18/2022 was normal recommended recheck in 10 years and advised to find out more information on dad's reason for colectomy given history of polyps to see if genetic testing could be done and also to check for  celiac given iron deficiency anemia even if probably due to heavy menses.       Seen 3/7/22 for a pap.  Was to continue iron supplement daily and recheck iron labs & for celiac in 3 months.  Continued on psyllium to prevent constipation from iron. Iron deficiency anemia suspected due to chronic loss due to heavy periods, suspected due to large fibroids. History of ovarian cyst but unable to see ovaries on most recent ultrasound due to fibroid distortion. Family history of factor V Leiden with a recent factor V testing was negative. Discussed Family history of dad having had a colectomy for many serrated adenoma polyps, sister also with history of colonic polyps.  Recent colonoscopy on 2/18/2022 was normal and recommended recheck in 10 years.  GI did recommend checking for celiac and this was to be added to future labs. Given family history though would recommend  consult to assess risk of a genetic polyposis condition.  If unable to do genetic testing to consider colonoscopy every 5 years instead to be safe. Discussed genetic testing may be helpful also given family history of esophageal cancer. Was to continue omega for mildly elevated LDL.  Overall cardiovascular risk was low. Given history of prediabetes and BMI, to continue with fiber intake and eating healthy and losing weight to lower cardiovascular & cancer risk. Discussed could refer to hematology if hemoglobin did not go adequately up post surgery.   Seen by gyn 3/22 and options discussed and planning n MATTHEW, B/l salpingectomy and cystoscopy but to leave ovaries in place unless something found intraoperatively. Planned for iv iron at time of surgery.     On 4/15/21 cbc noted improved to hb of 13.4, celiac testing was negative. Peripheral smear was normal, iron improved form 16 to 36, ferritin improved to 38 & vit B 12 was 562 , wnl.   Seen by derm 4/28/22 and had a shave biopsy of nose lesion and noted other benign lesions.  Seen 6/3/22 for preop  for hysterectomy.  CMP showed elevated ALT.  Glucose is elevated.  Hemoglobin A1c in normal range.  Had a normal CBC and cleared for procedure.  On 7/1/22 underwent Abdominal supracervical hysterectomy, bilateral salpingectomy, left oophorectomy, cystoscopy, pelvic washings, for abnormal uterine bleeding, Fibroid uterus w/ bulk symptoms & suspected bilateral hydrosalpinx. Post-operative diagnosis was Fibroid uterus & Endometriosis with very large endometrioma. Pathology showed Uterus, supracervical hysterectomy: Benign endometrial polyp on a background of inactive-type endometrium, negative for hyperplasia or atypia, Leiomyomas, Uterine serosal endometriosis and adhesions. Left ovary and fallopian tube, salpingo-oophorectomy: Ovary with a benign endometriotic cyst & Fallopian tube with endometriosis, Right fallopian tube, salpingectomy: Fallopian tube with endometriosis. Uterus, myomectomy: -Leiomyoma. Pelvic washings were normal. Post op Hb was 10. Seen by gyn 7/18/22 for 2 week post op check, and 8/17/22 for 6 week post op check and noted was doing well.      Seen 10/6/22 Iron def was on iron till had hysterectomy in done in July. Off the iron post op and here for recheck, feeling well. Hx of abdominal supracervical subtotal hysterectomy on 7/1/22, bilateral salpingectomy, left oophorectomy, cystoscopy, pelvic washings. Pathology showed Uterus, supracervical hysterectomy: Benign endometrial polyp on a background of inactive-type endometrium, negative for hyperplasia or atypia, Leiomyomas, Uterine serosal endometriosis and adhesions. Left ovary and fallopian tube, salpingo-oophorectomy: Ovary with a benign endometriotic cyst & Fallopian tube with endometriosis, Right fallopian tube, salpingectomy: Fallopian tube with endometriosis. Uterus, myomectomy: -Leiomyoma. Pelvic washings were normal. Post op Hb was 10. Seen by gyn 7/18/22 for 2 week post op check, and 8/17/22 for 6 week post op check and noted was doing  well. Advised pap every 5 yrs,as cervix still in place,  due 2027 and to see gyn if should have pain given the hx of endometriosis. BMI 33, weight up since last visit as not been able to exercise much due to surgery till now. Working on a couch to [employer] program with sister. Hx of prediabetes,    Elevated ALT, to consider u/s liver. FH colonic polyps, had colonoscopy in 2022 was normal advised recheck in 2025. FH of esophageal cancer, FH of factor 5, Opted no genetic testing for now. Noted a potential exposure to Covid notified on phone but tested negative that am & was asymptomatic so far. Partner had Covid recently and tested positive till 2 weeks prior.was given the flu and Moderna COVID booster.  Labs later showed BMP with elevated glucose, LFTs with elevated AST and ALT, ferritin and CBC and hemoglobin A1c were normal.  Ultrasound done to evaluate liver test on 10/24/2022 showed asymptomatic gallstones and hepatic steatosis and referred to GI.  Mammogram done 1/31/2023 was normal.    Seen 2/10/23 for preventive physical and follow-up from last visit. Breast exam normal to continue with self checks regularly.  Mammogram normal 1/2023.  Pap HPV due in 2027 given had a supracervical hysterectomy and cervix still in place. History of prior endometrioma removal along with fibroids if should have pain to follow-up with Gyn. Noted some mild bleeding here and there but if persisted or got worse to contact gynecology. Healthcare maintenance reviewed. To consider working on healthcare directives. Vaccines for COVID, flu and Tdap up-to-date. To consider hepatitis B vaccination, later lab work for GI showed was not immune to hepatitis B. BMI improved to 32, to continue with healthy lifestyle increased physical activity low-carb intake and healthy diet. Elevated LFTs liver tests and ultrasound showed hepatic steatosis/fatty liver.  To see GI on the 13th and labs for them released to be done. Gallstones appeared to be  asymptomatic, to monitor for pain after a fatty meal.  Sometimes gallstones could occur with rapid weight loss. History of prior prediabetes, HBa1c improved. Declined a  referral given family history of colonic polyps and esophageal cancer. Noted did not factor V Leiden deficiency on testing given family history.  Was to return in 1 year for preventive physical and sooner on an office visit for any new concerns.    B12 came back normal.  LDL was elevated but ASCVD risk was low at 1.3%.  Abnormal TSH hemoglobin A1c.  Was not immune to hepatitis B.  Seen by hepatologist 2/13/2023 for NAFLD.  Fibrosis scan was stage 0-I grade 3 steatosis.  Recommended FIB 4 score yearly with PCP.  Advised slow gradual weight loss.  Okay to start a statin if needed despite elevated LFTs.  To consider GLP-1 inhibitor to help with insulin resistance and weight loss.  Advise aggressive optimization of cholesterol glucose lipids.  Increase physical activity additional test done to rule out other causes and in August alpha-1 antitrypsin was normal F-actin was normal HUMAIRA was normal negative celiac panel AST was 77 .  On 1/29/2024 noted normal INR, AST 49  BMP showed calcium of 10.2 normal CBC and platelets.  Visited with a hepatologist virtually on 2/7/2024 for metabolic associated fatty liver disease FIB 4 score was 0.78.  Advise gradual weight loss, aggressive cholesterol glucose control evaluate for sleep apnea, recheck FibroScan or elastogram in 5 years follow-up with GI as needed.  [facility] 2/5/2024 was normal.  [facility] negative    Past Medical History:   Diagnosis Date    BMI 32.0-32.9,adult 01/17/2022    Cyst of left ovary 01/12/2016    Endometriosis 10/06/2022    Intramural leiomyoma of uterus 01/12/2016    Iron deficiency anemia due to chronic blood loss 03/07/2022    Menorrhagia with regular cycle 01/17/2022    none     Prediabetes 03/07/2022     Past Surgical History:   Procedure Laterality Date     COLONOSCOPY  2022    HYSTERECTOMY SUPRACERVICAL Bilateral 2022    Procedure: Abdominal Supracervical Hysterectomy, Bilateral Salpingectomy, left oophorectomy, cystoscopy and pelvic washings.;  Surgeon: Kasandra Multani MD;  Location: [facility] OR     OB History    Para Term  AB Living   0 0 0 0 0 0   SAB IAB Ectopic Multiple Live Births   0 0 0 0 0     Lab work is in process  Labs reviewed in EPIC  BP Readings from Last 3 Encounters:   24 118/72   23 122/71   02/10/23 118/80    Wt Readings from Last 3 Encounters:   24 87.9 kg (193 lb 12.8 oz)   23 87.5 kg (192 lb 12.8 oz)   02/10/23 85.7 kg (189 lb)          Patient Active Problem List   Diagnosis    Family history of esophageal cancer    Family history of colonic polyps    Family history of factor V Leiden mutation    H/O abdominal supracervical subtotal hysterectomy    NAFLD (nonalcoholic fatty liver disease)    Elevated LFTs    Calculus of gallbladder without cholecystitis without obstruction    Class 1 obesity due to excess calories with serious comorbidity and body mass index (BMI) of 33.0 to 33.9 in adult    Vitamin D deficiency    Serum calcium elevated    Dyslipidemia    Snoring    Digital mucinous cyst of finger of right hand    Multiple pigmented nevi     Past Surgical History:   Procedure Laterality Date    COLONOSCOPY  2022    HYSTERECTOMY SUPRACERVICAL Bilateral 2022    Procedure: Abdominal Supracervical Hysterectomy, Bilateral Salpingectomy, left oophorectomy, cystoscopy and pelvic washings.;  Surgeon: Kasandra Multani MD;  Location: [facility] OR       Social History     Tobacco Use    Smoking status: Former     Packs/day: 0.50     Years: 11.00     Additional pack years: 0.00     Total pack years: 5.50     Types: Cigarettes     Start date: 1999     Quit date: 11/15/2009     Years since quittin.2     Passive exposure: Past    Smokeless tobacco: Never   Substance Use Topics    Alcohol use:  "Yes     Comment: 0-1 drink weekly     Family History   Problem Relation Age of Onset    Esophageal Cancer Mother 55        esophageal cancer    Blood Disease Mother         factor 5    Diabetes Father         type 2    Respiratory Father         emphasema    Colon Polyps Father         had colon removed    Other - See Comments Maternal Grandfather         ? colon cancer    Diabetes Paternal Grandmother          No current outpatient medications on file.     No Known Allergies  Recent Labs   Lab Test 02/12/24  1111 01/29/24  1016 08/24/23  0839 02/10/23  1118 10/06/22  1130 10/06/22  1130 06/03/22  1348 01/17/22  1111   A1C 5.6  --   --  5.5  --  5.4   < > 5.7*   *  --   --  147*  --   --   --  158*   HDL 44*  --   --  44*  --   --   --  43*   TRIG 246*  --   --  307*  --   --   --  203*   * 106* 129* 118*  --  161*   < > 56*   CR 0.80 0.73  --  0.73  --  0.66   < > 0.77   GFRESTIMATED >90 >90  --  >90  --  >90   < > >90   POTASSIUM 4.2 4.3  --  4.2   < > 3.9   < > 3.9   TSH 2.76  --   --  2.73  --   --   --  3.44    < > = values in this interval not displayed.      Review of Systems    Review of Systems  Constitutional, HEENT, cardiovascular, pulmonary, GI, , musculoskeletal, neuro, skin, endocrine and psych systems are negative, except as otherwise noted.     Objective    Exam  /72 (BP Location: Right arm, Patient Position: Sitting, Cuff Size: Adult Large)   Pulse 63   Temp 97  F (36.1  C) (Temporal)   Resp 18   Ht 1.614 m (5' 3.54\")   Wt 87.9 kg (193 lb 12.8 oz)   LMP 03/12/2022 (Exact Date)   SpO2 100%   Breastfeeding No   BMI 33.75 kg/m     Estimated body mass index is 33.75 kg/m  as calculated from the following:    Height as of this encounter: 1.614 m (5' 3.54\").    Weight as of this encounter: 87.9 kg (193 lb 12.8 oz).    Physical Exam  GENERAL: alert and no distress  EYES: Eyes grossly normal to inspection, PERRL and conjunctivae and sclerae normal, glasses  HENT: ear canals " and TM's normal, nose and mouth without ulcers or lesions  NECK: no adenopathy, no asymmetry, masses, or scars  RESP: lungs clear to auscultation - no rales, rhonchi or wheezes  CV: regular rate and rhythm, normal S1 S2, no S3 or S4, no murmur, click or rub, no peripheral edema  ABDOMEN: soft, nontender, no hepatosplenomegaly, no masses and bowel sounds normal  MS: no gross musculoskeletal defects noted, no edema  SKIN: no suspicious lesions or rashes, right dorsal radial nail fold side of right middle finger 1/2 cm cyst, multiple pigmented nevi,  itchy new rasied brown 2 mm mole ventral mid right wrist , to monitor   NEURO: Normal strength and tone, mentation intact and speech normal  PSYCH: mentation appears normal, affect normal/bright  LYMPH: no cervical, supraclavicular, axillary, or inguinal adenopathy    Signed Electronically by: Kerrie Proctor MD

## 2024-02-12 NOTE — RESULT ENCOUNTER NOTE
Hello -    Here are my comments about your recent results:  Ionized calcium is elevated at 5.3.  Will see what remaining test show based on that we will make recommendations if need more evaluation    Please let us know if you have any questions or concerns.     Regards,  Kerrie Proctor MD

## 2024-02-12 NOTE — PATIENT INSTRUCTIONS
Seen today for preventive physical and follow-up.  Breast exam normal continue self check regularly mammogram done on 5 February showed dense breast which may miss tiny things continue with annual screening.  Pap HPV due 2027 given had a supracervical hysterectomy and cervix still in place.  History of prior endometrioma if should have worsening pain persistent pain vaginal bleeding then to contact gynecology.  Recent discomfort in January may have been related to that could also have been due to constipation due to change in diet habits etc.  Colon cancer screening due again in 2027.  Does have family history of colon polyps in dad and may have had history of colon cancer maternal grandfather to clarify history and get back to us.  Consider  referral given family history of cancer if covered by insurance, defers for now  Healthcare maintenance reviewed.  Consider working on healthcare directives and honoring choices given to review.  Vaccines reviewed due and given Tdap today.  Due for hepatitis A and B and Twinrix No. 1 given today to get the second shot in 2 months and the third 1 4 months after the second.  Fasting lab work today and will make further recommendations once those are reviewed.  BMI 33 with history of prediabetes metabolic liver disease elevated LFTs.  Encouraged low carbohydrate diet increase physical activity and weight loss.  Offered referral to sleep specialist given history of snoring defers for now recommend sleeping on side and weight loss will help.  Discussed weight loss medications reported not covered by insurance.  Offered nutritionist and referral to weight specialist will hold off for now and recommit to dietary and lifestyle changes.  History of prediabetes we will check hemoglobin A1c today.  History of metabolic fatty liver disease.  Fibrosis scan in 2023 showed stage 0-I grade 3 steatosis  Seen by hepatologist recently virtually.  Currently fibrosis score is 0.78.   Encouraged slow gradual weight loss, ongoing aggressive risk modification with yearly screening for cholesterol blood glucose evaluate for sleep apnea.  Maintain good control of cholesterol there is no contraindication to starting a statin if indicated  Even with elevation in liver test.  Optimize blood glucose.  Discussed GLP-1 inhibitors for both insulin resistance and weight loss currently defers due to cost and noncoverage.  Encouraged limiting carbohydrates specially simple carbs and following a Mediterranean eating pattern.  Encourage increase physical activity ideally 30 minutes a day of 150 minutes weekly.  Encouraged limiting alcohol to 1 small drink in a 24 timeframe no more than 3 drinks a week.  Counseled against THC gummy use as it may also worsen calorie intake  Asymptomatic gallstones gradual weight loss will prevent these from flaring up.  Recent labs showed mildly elevated calcium may be artifact but will check vitamin D parathyroid to be sure.  Has a digital mucinous cyst right middle finger near nail fold that is currently not causing any pain or affecting functioning.  Monitor for now if gets rapidly bigger or painful or draining or red to contact us.  Monitor for skin  nail color changes  Multiple pigmented nevi, seen by dermatology 4/2022 lesion removed nose returned was benign, has a new 2 mm itchy brown raised mole ventral surface mid right wrist that looks benign under magnification.  Monitor for now and follow-up with dermatology in 2025 as discussed previously with them  Could consider referral to  given family history of esophageal cancer, possible colon cancer, colonic polyps.  Reviewed prior testing negative for factor V Leiden deficiency given family history  Return in 1 year for preventive physical and sooner in an office visit for any new concerns  The above note was dictated using voice recognition. Although reviewed after completion, some word and grammatical error may  "remain .               Eating Healthy Foods: Care Instructions  With every meal, you can make healthy food choices. Try to eat a variety of fruits, vegetables, whole grains, lean proteins, and low-fat dairy products. This can help you get the right balance of nutrients, including vitamins and minerals. Small changes add up over time. You can start by adding one healthy food to your meals each day.    Try to make half your plate fruits and vegetables, one-fourth whole grains, and one-fourth lean proteins. Try including dairy with your meals.   Eat more fruits and vegetables. Try to have them with most meals and snacks.   Foods for healthy eating    Fruits    These can be fresh, frozen, canned, or dried.  Try to choose whole fruit rather than fruit juice.  Eat a variety of colors.    Vegetables    These can be fresh, frozen, canned, or dried.  Beans, peas, and lentils count too.    Whole grains    Choose whole-grain breads, cereals, and noodles.  Try brown rice.    Lean proteins    These can include lean meat, poultry, fish, and eggs.  You can also have tofu, beans, peas, lentils, nuts, and seeds.    Dairy    Try milk, yogurt, and cheese.  Choose low-fat or fat-free when you can.  If you need to, use lactose-free milk or fortified plant-based milk products, such as soy milk.    Water    Drink water when you're thirsty.  Limit sugar-sweetened drinks, including soda, fruit drinks, and sports drinks.  Where can you learn more?  Go to https://www.AuditionBooth.net/patiented  Enter T756 in the search box to learn more about \"Eating Healthy Foods: Care Instructions.\"  Current as of: February 28, 2023               Content Version: 13.8    8385-0907 [email or web address].   Care instructions adapted under license by your healthcare professional. If you have questions about a medical condition or this instruction, always ask your healthcare professional. [email or web address] disclaims any warranty or liability for your " use of this information.      Learning About Stress  What is stress?     Stress is your body's response to a hard situation. Your body can have a physical, emotional, or mental response. Stress is a fact of life for most people, and it affects everyone differently. What causes stress for you may not be stressful for someone else.  A lot of things can cause stress. You may feel stress when you go on a job interview, take a test, or run a race. This kind of short-term stress is normal and even useful. It can help you if you need to work hard or react quickly. For example, stress can help you finish an important job on time.  Long-term stress is caused by ongoing stressful situations or events. Examples of long-term stress include long-term health problems, ongoing problems at work, or conflicts in your family. Long-term stress can harm your health.  How does stress affect your health?  When you are stressed, your body responds as though you are in danger. It makes hormones that speed up your heart, make you breathe faster, and give you a burst of energy. This is called the fight-or-flight stress response. If the stress is over quickly, your body goes back to normal and no harm is done.  But if stress happens too often or lasts too long, it can have bad effects. Long-term stress can make you more likely to get sick, and it can make symptoms of some diseases worse. If you tense up when you are stressed, you may develop neck, shoulder, or low back pain. Stress is linked to high blood pressure and heart disease.  Stress also harms your emotional health. It can make you barber, tense, or depressed. Your relationships may suffer, and you may not do well at work or school.  What can you do to manage stress?  You can try these things to help manage stress:   Do something active. Exercise or activity can help reduce stress. Walking is a great way to get started. Even everyday activities such as housecleaning or yard work can  help.  Try yoga or jadyn chi. These techniques combine exercise and meditation. You may need some training at first to learn them.  Do something you enjoy. For example, listen to music or go to a movie. Practice your hobby or do volunteer work.  Meditate. This can help you relax, because you are not worrying about what happened before or what may happen in the future.  Do guided imagery. Imagine yourself in any setting that helps you feel calm. You can use online videos, books, or a teacher to guide you.  Do breathing exercises. For example:  From a standing position, bend forward from the waist with your knees slightly bent. Let your arms dangle close to the floor.  Breathe in slowly and deeply as you return to a standing position. Roll up slowly and lift your head last.  Hold your breath for just a few seconds in the standing position.  Breathe out slowly and bend forward from the waist.  Let your feelings out. Talk, laugh, cry, and express anger when you need to. Talking with supportive friends or family, a counselor, or a sarah leader about your feelings is a healthy way to relieve stress. Avoid discussing your feelings with people who make you feel worse.  Write. It may help to write about things that are bothering you. This helps you find out how much stress you feel and what is causing it. When you know this, you can find better ways to cope.  What can you do to prevent stress?  You might try some of these things to help prevent stress:  Manage your time. This helps you find time to do the things you want and need to do.  Get enough sleep. Your body recovers from the stresses of the day while you are sleeping.  Get support. Your family, friends, and community can make a difference in how you experience stress.  Limit your news feed. Avoid or limit time on social media or news that may make you feel stressed.  Do something active. Exercise or activity can help reduce stress. Walking is a great way to get  "started.  Where can you learn more?  Go to https://www.clypd.net/patiented  Enter N032 in the search box to learn more about \"Learning About Stress.\"  Current as of: February 26, 2023               Content Version: 13.8    0432-7143 [employer].   Care instructions adapted under license by your healthcare professional. If you have questions about a medical condition or this instruction, always ask your healthcare professional. [employer] disclaims any warranty or liability for your use of this information.      Substance Use Disorder: Care Instructions  Overview     You can improve your life and health by stopping your use of alcohol or drugs. When you don't drink or use drugs, you may feel and sleep better. You may get along better with your family, friends, and coworkers. There are medicines and programs that can help with substance use disorder.  How can you care for yourself at home?  Here are some ways to help you stay sober and prevent relapse.  If you have been given medicine to help keep you sober or reduce your cravings, be sure to take it exactly as prescribed.  Talk to your doctor about programs that can help you stop using drugs or drinking alcohol.  Do not keep alcohol or drugs in your home.  Plan ahead. Think about what you'll say if other people ask you to drink or use drugs. Try not to spend time with people who drink or use drugs.  Use the time and money spent on drinking or drugs to do something that's important to you.  Preventing a relapse  Have a plan to deal with relapse. Learn to recognize changes in your thinking that lead you to drink or use drugs. Get help before you start to drink or use drugs again.  Try to stay away from situations, friends, or places that may lead you to drink or use drugs.  If you feel the need to drink alcohol or use drugs again, seek help right away. Call a trusted friend or family member. Some people get support from organizations " such as Narcotics Anonymous or tag[employer]in or from treatment facilities.  If you relapse, get help as soon as you can. Some people make a plan with another person that outlines what they want that person to do for them if they relapse. The plan usually includes how to handle the relapse and who to notify in case of relapse.  Don't give up. Remember that a relapse doesn't mean that you have failed. Use the experience to learn the triggers that lead you to drink or use drugs. Then quit again. Recovery is a lifelong process. Many people have several relapses before they are able to quit for good.  Follow-up care is a key part of your treatment and safety. Be sure to make and go to all appointments, and call your doctor if you are having problems. It's also a good idea to know your test results and keep a list of the medicines you take.  When should you call for help?   Call 911  anytime you think you may need emergency care. For example, call if you or someone else:    Has overdosed or has withdrawal signs. Be sure to tell the emergency workers that you are or someone else is using or trying to quit using drugs. Overdose or withdrawal signs may include:  Losing consciousness.  Seizure.  Seeing or hearing things that aren't there (hallucinations).     Is thinking or talking about suicide or harming others.   Where to get help 24 hours a day, 7 days a week   If you or someone you know talks about suicide, self-harm, a mental health crisis, a substance use crisis, or any other kind of emotional distress, get help right away. You can:    Call the Suicide and Crisis Lifeline at 8.     Call 6-846-403-TALK (1-487.669.1965).     Text HOME to 043042 to access the Crisis Text Line.   Consider saving these numbers in your phone.  Go to OneWheel.org for more information or to chat online.  Call your doctor now or seek immediate medical care if:    You are having withdrawal symptoms. These may include nausea or vomiting,  "sweating, shakiness, and anxiety.   Watch closely for changes in your health, and be sure to contact your doctor if:    You have a relapse.     You need more help or support to stop.   Where can you learn more?  Go to https://www.iSyndica.net/patiented  Enter H573 in the search box to learn more about \"Substance Use Disorder: Care Instructions.\"  Current as of: March 21, 2023               Content Version: 13.8    0969-8791 [employer].   Care instructions adapted under license by your healthcare professional. If you have questions about a medical condition or this instruction, always ask your healthcare professional. [employer] disclaims any warranty or liability for your use of this information.      Preventive Care Advice   This is general advice given by our system to help you stay healthy. However, your care team may have specific advice just for you. Please talk to your care team about your preventive care needs.  Nutrition  Eat 5 or more servings of fruits and vegetables each day.  Try wheat bread, brown rice and whole grain pasta (instead of white bread, rice, and pasta).  Get enough calcium and vitamin D. Check the label on foods and aim for 100% of the RDA (recommended daily allowance).  Lifestyle  Exercise at least 150 minutes each week  (30 minutes a day, 5 days a week).  Do muscle strengthening activities 2 days a week. These help control your weight and prevent disease.  No smoking.  Wear sunscreen to prevent skin cancer.  Have a dental exam and cleaning every 6 months.  Yearly exams  See your health care team every year to talk about:  Any changes in your health.  Any medicines your care team has prescribed.  Preventive care, family planning, and ways to prevent chronic diseases.  Shots (vaccines)   HPV shots (up to age 26), if you've never had them before.  Hepatitis B shots (up to age 59), if you've never had them before.  COVID-19 shot: Get this shot when it's due.  Flu " shot: Get a flu shot every year.  Tetanus shot: Get a tetanus shot every 10 years.  Pneumococcal, hepatitis A, and RSV shots: Ask your care team if you need these based on your risk.  Shingles shot (for age 50 and up)  General health tests  Diabetes screening:  Starting at age 35, Get screened for diabetes at least every 3 years.  If you are younger than age 35, ask your care team if you should be screened for diabetes.  Cholesterol test: At age 39, start having a cholesterol test every 5 years, or more often if advised.  Bone density scan (DEXA): At age 50, ask your care team if you should have this scan for osteoporosis (brittle bones).  Hepatitis C: Get tested at least once in your life.  STIs (sexually transmitted infections)  Before age 24: Ask your care team if you should be screened for STIs.  After age 24: Get screened for STIs if you're at risk. You are at risk for STIs (including HIV) if:  You are sexually active with more than one person.  You don't use condoms every time.  You or a partner was diagnosed with a sexually transmitted infection.  If you are at risk for HIV, ask about PrEP medicine to prevent HIV.  Get tested for HIV at least once in your life, whether you are at risk for HIV or not.  Cancer screening tests  Cervical cancer screening: If you have a cervix, begin getting regular cervical cancer screening tests starting at age 21.  Breast cancer scan (mammogram): If you've ever had breasts, begin having regular mammograms starting at age 40. This is a scan to check for breast cancer.  Colon cancer screening: It is important to start screening for colon cancer at age 45.  Have a colonoscopy test every 10 years (or more often if you're at risk) Or, ask your provider about stool tests like a FIT test every year or Cologuard test every 3 years.  To learn more about your testing options, visit:   https://[email or web address]/[email or web address].  For help making a decision, visit:    [email or web address]/zm73324.  Prostate cancer screening test: If you have a prostate, ask your care team if a prostate cancer screening test (PSA) at age 55 is right for you.  Lung cancer screening: If you are a current or former smoker ages 50 to 80, ask your care team if ongoing lung cancer screenings are right for you.  For informational purposes only. Not to replace the advice of your health care provider. Copyright   2023 [facility]. All rights reserved. Clinically reviewed by the [facility] Transitions Program. [employer] [id number] - REV 01/24.

## 2024-02-12 NOTE — RESULT ENCOUNTER NOTE
Hello -    Here are my comments about your recent results:  -A1C (diabetic test) is normal and indicates that your blood sugar has been in a normal range the last 3 months.  For additional lab test information, [email or web address] is an excellent reference..    Please let us know if you have any questions or concerns.     Regards,  Kerrie Proctor MD

## 2024-02-13 NOTE — RESULT ENCOUNTER NOTE
Hello -    Here are my comments about your recent results:  The 10-year ASCVD risk score (Vipin LOU, et al., 2019) is: 1.6%    Values used to calculate the score:      Age: 43 years      Sex: Female      Is Non- : No      Diabetic: No      Tobacco smoker: No      Systolic Blood Pressure: 118 mmHg      Is BP treated: No      HDL Cholesterol: 44 mg/dL      Total Cholesterol: 271 mg/dL  -LDL(bad) cholesterol level is elevated, HDL(good) cholesterol level is low and your triglycerides are elevated which can increase your heart disease risk.  A diet high in fat and simple carbohydrates, genetics and being overweight can contribute to this. ADVISE: exercising 150 minutes of aerobic exercise per week (30 minutes for 5 days per week or 50 minutes for 3 days per week are options), eating a low saturated fat/low carbohydrate diet, and omega-3 fatty acids (fish oil) 0660-0545 mg daily are helpful to improve this. In 6 months, you should recheck your fasting cholesterol panel by scheduling a lab-only appointment.  -Liver and gallbladder tests (ALT,AST, Alk phos,bilirubin) AST and ALT are elevated rest of liver tests normal  -Kidney function (GFR) is normal.  -Sodium is normal.  -Potassium is normal.  -Calcium is elevated.  ADVISE: rechecking this in 6 months  Ionized calcium is elevated parathyroid is normal vitamin D is low.  Recommend replacing vitamin D as noted below and rechecking calcium vitamin D parathyroid in 6 months with cholesterol and making further recommendations after that  -Glucose is slight elevated and may be a sign of early diabetes (prediabetes). ADVISE:: eating a low carbohydrate diet, exercising, trying to lose weight (if necessary) and rechecking your glucose level in 6 months.  -TSH (thyroid stimulating hormone) level is normal which indicates normal thyroid function.  -Vitamin D level is low and oral supplementation should be started.  ADVISE: starting over the counter  Cholecalciferol (Vitamin D3) 5000 IU daily for 6 weeks and then 1000 IU daily to maintain levels.  Then in 2-3 months, please schedule a lab only appointment to recheck your Vitamin D levels.  -Ferritin (iron) level is normal.  Normal thyroid thyroid  For additional lab test information, [email or web address] is an excellent reference..    Please let us know if you have any questions or concerns.     Regards,  Kerrie Proctor MD

## 2024-04-15 ENCOUNTER — ALLIED HEALTH/NURSE VISIT (OUTPATIENT)
Dept: FAMILY MEDICINE | Facility: CLINIC | Age: 44
End: 2024-04-15
Payer: COMMERCIAL

## 2024-04-15 DIAGNOSIS — Z23 ENCOUNTER FOR IMMUNIZATION: Primary | ICD-10-CM

## 2024-04-15 PROCEDURE — 90746 HEPB VACCINE 3 DOSE ADULT IM: CPT

## 2024-04-15 PROCEDURE — 99207 PR NO CHARGE NURSE ONLY: CPT

## 2024-04-15 PROCEDURE — 90471 IMMUNIZATION ADMIN: CPT

## 2024-04-15 NOTE — PROGRESS NOTES
Prior to immunization administration, verified patients identity using patient s name and date of birth. Please see Immunization Activity for additional information.     Screening Questionnaire for Adult Immunization    Are you sick today?   No   Do you have allergies to medications, food, a vaccine component or latex?   No   Have you ever had a serious reaction after receiving a vaccination?   No   Do you have a long-term health problem with heart, lung, kidney, or metabolic disease (e.g., diabetes), asthma, a blood disorder, no spleen, complement component deficiency, a cochlear implant, or a spinal fluid leak?  Are you on long-term aspirin therapy?   No   Do you have cancer, leukemia, HIV/AIDS, or any other immune system problem?   No   Do you have a parent, brother, or sister with an immune system problem?   No   In the past 3 months, have you taken medications that affect  your immune system, such as prednisone, other steroids, or anticancer drugs; drugs for the treatment of rheumatoid arthritis, Crohn s disease, or psoriasis; or have you had radiation treatments?   No   Have you had a seizure, or a brain or other nervous system problem?   No   During the past year, have you received a transfusion of blood or blood    products, or been given immune (gamma) globulin or antiviral drug?   No   For women: Are you pregnant or is there a chance you could become       pregnant during the next month?   No   Have you received any vaccinations in the past 4 weeks?   No     Immunization questionnaire answers were all negative.    I have reviewed the following standing orders:   This patient is due and qualifies for the Hepatitis B vaccine.    Click here for Hepatitis B Standing Order    I have reviewed the vaccines inclusion and exclusion criteria; No concerns regarding eligibility.     Patient instructed to remain in clinic for 15 minutes afterwards, and to report any adverse reactions.     Screening performed by Terrie CHRISTENSEN  ALBERTO Lopez on 4/15/2024 at 9:57 AM.

## 2024-08-12 ENCOUNTER — LAB (OUTPATIENT)
Dept: LAB | Facility: CLINIC | Age: 44
End: 2024-08-12
Payer: COMMERCIAL

## 2024-08-12 ENCOUNTER — ALLIED HEALTH/NURSE VISIT (OUTPATIENT)
Dept: FAMILY MEDICINE | Facility: CLINIC | Age: 44
End: 2024-08-12
Payer: COMMERCIAL

## 2024-08-12 DIAGNOSIS — E66.811 CLASS 1 OBESITY DUE TO EXCESS CALORIES WITH SERIOUS COMORBIDITY AND BODY MASS INDEX (BMI) OF 33.0 TO 33.9 IN ADULT: ICD-10-CM

## 2024-08-12 DIAGNOSIS — R73.03 PREDIABETES: ICD-10-CM

## 2024-08-12 DIAGNOSIS — R79.89 ELEVATED LFTS: ICD-10-CM

## 2024-08-12 DIAGNOSIS — E83.52 SERUM CALCIUM ELEVATED: ICD-10-CM

## 2024-08-12 DIAGNOSIS — K76.0 NAFLD (NONALCOHOLIC FATTY LIVER DISEASE): ICD-10-CM

## 2024-08-12 DIAGNOSIS — E55.9 VITAMIN D DEFICIENCY: ICD-10-CM

## 2024-08-12 DIAGNOSIS — Z23 NEED FOR VACCINATION: Primary | ICD-10-CM

## 2024-08-12 DIAGNOSIS — E66.09 CLASS 1 OBESITY DUE TO EXCESS CALORIES WITH SERIOUS COMORBIDITY AND BODY MASS INDEX (BMI) OF 33.0 TO 33.9 IN ADULT: ICD-10-CM

## 2024-08-12 DIAGNOSIS — E78.5 DYSLIPIDEMIA: ICD-10-CM

## 2024-08-12 LAB
ANION GAP SERPL CALCULATED.3IONS-SCNC: 10 MMOL/L (ref 7–15)
BUN SERPL-MCNC: 10.5 MG/DL (ref 6–20)
CA-I BLD-MCNC: 5.3 MG/DL (ref 4.4–5.2)
CALCIUM SERPL-MCNC: 10.1 MG/DL (ref 8.8–10.4)
CHLORIDE SERPL-SCNC: 104 MMOL/L (ref 98–107)
CHOLEST SERPL-MCNC: 258 MG/DL
CREAT SERPL-MCNC: 0.76 MG/DL (ref 0.51–0.95)
EGFRCR SERPLBLD CKD-EPI 2021: >90 ML/MIN/1.73M2
FASTING STATUS PATIENT QL REPORTED: YES
FASTING STATUS PATIENT QL REPORTED: YES
GLUCOSE SERPL-MCNC: 114 MG/DL (ref 70–99)
HCO3 SERPL-SCNC: 25 MMOL/L (ref 22–29)
HDLC SERPL-MCNC: 41 MG/DL
LDLC SERPL CALC-MCNC: 167 MG/DL
NONHDLC SERPL-MCNC: 217 MG/DL
POTASSIUM SERPL-SCNC: 4 MMOL/L (ref 3.4–5.3)
PTH-INTACT SERPL-MCNC: 47 PG/ML (ref 15–65)
SODIUM SERPL-SCNC: 139 MMOL/L (ref 135–145)
TRIGL SERPL-MCNC: 251 MG/DL
VIT D+METAB SERPL-MCNC: 28 NG/ML (ref 20–50)

## 2024-08-12 PROCEDURE — 90746 HEPB VACCINE 3 DOSE ADULT IM: CPT

## 2024-08-12 PROCEDURE — 90471 IMMUNIZATION ADMIN: CPT

## 2024-08-12 PROCEDURE — 83970 ASSAY OF PARATHORMONE: CPT

## 2024-08-12 PROCEDURE — 82330 ASSAY OF CALCIUM: CPT

## 2024-08-12 PROCEDURE — 36415 COLL VENOUS BLD VENIPUNCTURE: CPT

## 2024-08-12 PROCEDURE — 80061 LIPID PANEL: CPT

## 2024-08-12 PROCEDURE — 99207 PR NO CHARGE NURSE ONLY: CPT

## 2024-08-12 PROCEDURE — 82306 VITAMIN D 25 HYDROXY: CPT

## 2024-08-12 NOTE — PROGRESS NOTES
Prior to immunization administration, verified patients identity using patient s name and date of birth. Please see Immunization Activity for additional information.     Screening Questionnaire for Adult Immunization    Are you sick today?   No   Do you have allergies to medications, food, a vaccine component or latex?   No   Have you ever had a serious reaction after receiving a vaccination?   No   Do you have a long-term health problem with heart, lung, kidney, or metabolic disease (e.g., diabetes), asthma, a blood disorder, no spleen, complement component deficiency, a cochlear implant, or a spinal fluid leak?  Are you on long-term aspirin therapy?   No   Do you have cancer, leukemia, HIV/AIDS, or any other immune system problem?   No   Do you have a parent, brother, or sister with an immune system problem?   No   In the past 3 months, have you taken medications that affect  your immune system, such as prednisone, other steroids, or anticancer drugs; drugs for the treatment of rheumatoid arthritis, Crohn s disease, or psoriasis; or have you had radiation treatments?   No   Have you had a seizure, or a brain or other nervous system problem?   No   During the past year, have you received a transfusion of blood or blood    products, or been given immune (gamma) globulin or antiviral drug?   No   For women: Are you pregnant or is there a chance you could become       pregnant during the next month?   No   Have you received any vaccinations in the past 4 weeks?   No     Immunization questionnaire answers were all negative.    I have reviewed the following standing orders:   This patient is due and qualifies for the HIB vaccine.    Click here for HIB (Adult) Standing Order     I have reviewed the vaccines inclusion and exclusion criteria;No concerns regarding eligibility.       Patient instructed to remain in clinic for 15 minutes afterwards, and to report any adverse reactions.     Screening performed by Olive Sher  MA on 8/12/2024 at 9:09 AM.

## 2024-08-13 LAB
ALBUMIN SERPL BCG-MCNC: 4.4 G/DL (ref 3.5–5.2)
ALP SERPL-CCNC: 91 U/L (ref 40–150)
ALT SERPL W P-5'-P-CCNC: 59 U/L (ref 0–50)
AST SERPL W P-5'-P-CCNC: 38 U/L (ref 0–45)
BILIRUB DIRECT SERPL-MCNC: <0.2 MG/DL (ref 0–0.3)
BILIRUB SERPL-MCNC: 0.4 MG/DL
PROT SERPL-MCNC: 7.6 G/DL (ref 6.4–8.3)

## 2024-08-13 NOTE — RESULT ENCOUNTER NOTE
Hello -    Here are my comments about your recent results:  LDL is improved a bit from 178-167.  HDL is 41.  Total cholesterol has improved from 270- 258.  The 10-year ASCVD risk score (Vipin LOU, et al., 2019) is: 1.6%    Values used to calculate the score:      Age: 43 years      Sex: Female      Is Non- : No      Diabetic: No      Tobacco smoker: No      Systolic Blood Pressure: 118 mmHg      Is BP treated: No      HDL Cholesterol: 41 mg/dL      Total Cholesterol: 258 mg/dL  Overall cardiovascular risk remains low.  If would like more objective data to decide if statin medication is indicated we could do a CT calcium score let me know and I can put the order in.  BMP shows glucose is 114, serum calcium is normal ionized calcium remains mildly elevated at 5.3 though parathyroid is normal and now vitamin D is improved from 13-28.  Continue vitamin D 2000 units daily and I can refer you to an endocrinologist to do an E consult with an endocrinologist to see if there is any concern regarding this ionized calcium and if additional workup is needed please let me know.  Liver function test not back yet    Please let us know if you have any questions or concerns.     Regards,  Kerrie Proctor MD

## 2024-08-14 NOTE — RESULT ENCOUNTER NOTE
Hello -    Here are my comments about your recent results:  Liver Function tests are much improved AST is down to normal and ALT is almost normal at 59.  Keep up the good work with dietary and lifestyle changes    Please let us know if you have any questions or concerns.     Regards,  Kerrie Proctor MD

## 2024-10-30 ENCOUNTER — IMMUNIZATION (OUTPATIENT)
Dept: FAMILY MEDICINE | Facility: CLINIC | Age: 44
End: 2024-10-30
Payer: COMMERCIAL

## 2024-10-30 PROCEDURE — 90471 IMMUNIZATION ADMIN: CPT

## 2024-10-30 PROCEDURE — 90480 ADMN SARSCOV2 VAC 1/ONLY CMP: CPT

## 2024-10-30 PROCEDURE — 91320 SARSCV2 VAC 30MCG TRS-SUC IM: CPT

## 2024-10-30 PROCEDURE — 90656 IIV3 VACC NO PRSV 0.5 ML IM: CPT

## 2025-02-12 SDOH — HEALTH STABILITY: PHYSICAL HEALTH: ON AVERAGE, HOW MANY DAYS PER WEEK DO YOU ENGAGE IN MODERATE TO STRENUOUS EXERCISE (LIKE A BRISK WALK)?: 2 DAYS

## 2025-02-12 SDOH — HEALTH STABILITY: PHYSICAL HEALTH: ON AVERAGE, HOW MANY MINUTES DO YOU ENGAGE IN EXERCISE AT THIS LEVEL?: 30 MIN

## 2025-02-12 ASSESSMENT — SOCIAL DETERMINANTS OF HEALTH (SDOH): HOW OFTEN DO YOU GET TOGETHER WITH FRIENDS OR RELATIVES?: ONCE A WEEK

## 2025-02-17 ENCOUNTER — OFFICE VISIT (OUTPATIENT)
Dept: FAMILY MEDICINE | Facility: CLINIC | Age: 45
End: 2025-02-17
Attending: FAMILY MEDICINE
Payer: COMMERCIAL

## 2025-02-17 VITALS
WEIGHT: 190 LBS | TEMPERATURE: 99 F | SYSTOLIC BLOOD PRESSURE: 115 MMHG | RESPIRATION RATE: 16 BRPM | BODY MASS INDEX: 32.44 KG/M2 | OXYGEN SATURATION: 99 % | DIASTOLIC BLOOD PRESSURE: 67 MMHG | HEIGHT: 64 IN | HEART RATE: 82 BPM

## 2025-02-17 DIAGNOSIS — E83.52 SERUM CALCIUM ELEVATED: ICD-10-CM

## 2025-02-17 DIAGNOSIS — E66.811 CLASS 1 OBESITY DUE TO EXCESS CALORIES WITH SERIOUS COMORBIDITY AND BODY MASS INDEX (BMI) OF 33.0 TO 33.9 IN ADULT: ICD-10-CM

## 2025-02-17 DIAGNOSIS — Z13.1 SCREENING FOR DIABETES MELLITUS: ICD-10-CM

## 2025-02-17 DIAGNOSIS — D22.9 MULTIPLE PIGMENTED NEVI: ICD-10-CM

## 2025-02-17 DIAGNOSIS — Z83.2 FAMILY HISTORY OF FACTOR V LEIDEN MUTATION: ICD-10-CM

## 2025-02-17 DIAGNOSIS — Z71.89 ADVANCED DIRECTIVES, COUNSELING/DISCUSSION: ICD-10-CM

## 2025-02-17 DIAGNOSIS — E55.9 VITAMIN D DEFICIENCY: ICD-10-CM

## 2025-02-17 DIAGNOSIS — R06.83 SNORING: ICD-10-CM

## 2025-02-17 DIAGNOSIS — Z80.0 FAMILY HISTORY OF ESOPHAGEAL CANCER: ICD-10-CM

## 2025-02-17 DIAGNOSIS — K80.20 CALCULUS OF GALLBLADDER WITHOUT CHOLECYSTITIS WITHOUT OBSTRUCTION: ICD-10-CM

## 2025-02-17 DIAGNOSIS — E78.5 DYSLIPIDEMIA: ICD-10-CM

## 2025-02-17 DIAGNOSIS — Z90.711 H/O ABDOMINAL SUPRACERVICAL SUBTOTAL HYSTERECTOMY: ICD-10-CM

## 2025-02-17 DIAGNOSIS — Z12.11 COLON CANCER SCREENING: ICD-10-CM

## 2025-02-17 DIAGNOSIS — K76.0 NAFLD (NONALCOHOLIC FATTY LIVER DISEASE): ICD-10-CM

## 2025-02-17 DIAGNOSIS — Z12.31 VISIT FOR SCREENING MAMMOGRAM: ICD-10-CM

## 2025-02-17 DIAGNOSIS — R79.89 ELEVATED LFTS: ICD-10-CM

## 2025-02-17 DIAGNOSIS — M67.441 DIGITAL MUCINOUS CYST OF FINGER OF RIGHT HAND: ICD-10-CM

## 2025-02-17 DIAGNOSIS — Z00.00 ROUTINE HISTORY AND PHYSICAL EXAMINATION OF ADULT: Primary | ICD-10-CM

## 2025-02-17 DIAGNOSIS — E66.09 CLASS 1 OBESITY DUE TO EXCESS CALORIES WITH SERIOUS COMORBIDITY AND BODY MASS INDEX (BMI) OF 33.0 TO 33.9 IN ADULT: ICD-10-CM

## 2025-02-17 DIAGNOSIS — R92.343 EXTREMELY DENSE TISSUE OF BOTH BREASTS ON MAMMOGRAPHY: ICD-10-CM

## 2025-02-17 DIAGNOSIS — Z00.00 HEALTH CARE MAINTENANCE: ICD-10-CM

## 2025-02-17 DIAGNOSIS — Z23 NEED FOR HEPATITIS A AND B VACCINATION: ICD-10-CM

## 2025-02-17 DIAGNOSIS — Z13.0 SCREENING, ANEMIA, DEFICIENCY, IRON: ICD-10-CM

## 2025-02-17 DIAGNOSIS — R05.1 ACUTE COUGH: ICD-10-CM

## 2025-02-17 DIAGNOSIS — Z23 NEED FOR HEPATITIS A IMMUNIZATION: ICD-10-CM

## 2025-02-17 DIAGNOSIS — Z83.719 FAMILY HISTORY OF COLONIC POLYPS: ICD-10-CM

## 2025-02-17 LAB
ALBUMIN SERPL BCG-MCNC: 4.5 G/DL (ref 3.5–5.2)
ALP SERPL-CCNC: 99 U/L (ref 40–150)
ALT SERPL W P-5'-P-CCNC: 54 U/L (ref 0–50)
ANION GAP SERPL CALCULATED.3IONS-SCNC: 12 MMOL/L (ref 7–15)
AST SERPL W P-5'-P-CCNC: 35 U/L (ref 0–45)
BASOPHILS # BLD AUTO: 0 10E3/UL (ref 0–0.2)
BASOPHILS NFR BLD AUTO: 1 %
BILIRUB SERPL-MCNC: 0.5 MG/DL
BUN SERPL-MCNC: 12.7 MG/DL (ref 6–20)
CA-I BLD-MCNC: 5.2 MG/DL (ref 4.4–5.2)
CALCIUM SERPL-MCNC: 10.5 MG/DL (ref 8.8–10.4)
CHLORIDE SERPL-SCNC: 103 MMOL/L (ref 98–107)
CHOLEST SERPL-MCNC: 280 MG/DL
CREAT SERPL-MCNC: 0.71 MG/DL (ref 0.51–0.95)
EGFRCR SERPLBLD CKD-EPI 2021: >90 ML/MIN/1.73M2
EOSINOPHIL # BLD AUTO: 0.2 10E3/UL (ref 0–0.7)
EOSINOPHIL NFR BLD AUTO: 3 %
ERYTHROCYTE [DISTWIDTH] IN BLOOD BY AUTOMATED COUNT: 12.8 % (ref 10–15)
EST. AVERAGE GLUCOSE BLD GHB EST-MCNC: 108 MG/DL
FASTING STATUS PATIENT QL REPORTED: YES
FASTING STATUS PATIENT QL REPORTED: YES
GLUCOSE SERPL-MCNC: 103 MG/DL (ref 70–99)
HBA1C MFR BLD: 5.4 % (ref 0–5.6)
HCO3 SERPL-SCNC: 23 MMOL/L (ref 22–29)
HCT VFR BLD AUTO: 43.4 % (ref 35–47)
HDLC SERPL-MCNC: 45 MG/DL
HGB BLD-MCNC: 14.6 G/DL (ref 11.7–15.7)
IMM GRANULOCYTES # BLD: 0 10E3/UL
IMM GRANULOCYTES NFR BLD: 0 %
LDLC SERPL CALC-MCNC: 183 MG/DL
LYMPHOCYTES # BLD AUTO: 1.6 10E3/UL (ref 0.8–5.3)
LYMPHOCYTES NFR BLD AUTO: 20 %
MCH RBC QN AUTO: 29.3 PG (ref 26.5–33)
MCHC RBC AUTO-ENTMCNC: 33.6 G/DL (ref 31.5–36.5)
MCV RBC AUTO: 87 FL (ref 78–100)
MONOCYTES # BLD AUTO: 0.7 10E3/UL (ref 0–1.3)
MONOCYTES NFR BLD AUTO: 9 %
NEUTROPHILS # BLD AUTO: 5.4 10E3/UL (ref 1.6–8.3)
NEUTROPHILS NFR BLD AUTO: 67 %
NONHDLC SERPL-MCNC: 235 MG/DL
PLATELET # BLD AUTO: 248 10E3/UL (ref 150–450)
POTASSIUM SERPL-SCNC: 4.4 MMOL/L (ref 3.4–5.3)
PROT SERPL-MCNC: 8 G/DL (ref 6.4–8.3)
PTH-INTACT SERPL-MCNC: 61 PG/ML (ref 15–65)
RBC # BLD AUTO: 4.99 10E6/UL (ref 3.8–5.2)
SODIUM SERPL-SCNC: 138 MMOL/L (ref 135–145)
TRIGL SERPL-MCNC: 258 MG/DL
TSH SERPL DL<=0.005 MIU/L-ACNC: 2.05 UIU/ML (ref 0.3–4.2)
VIT D+METAB SERPL-MCNC: 22 NG/ML (ref 20–50)
WBC # BLD AUTO: 8 10E3/UL (ref 4–11)

## 2025-02-17 PROCEDURE — 84443 ASSAY THYROID STIM HORMONE: CPT | Performed by: FAMILY MEDICINE

## 2025-02-17 PROCEDURE — 90632 HEPA VACCINE ADULT IM: CPT | Performed by: FAMILY MEDICINE

## 2025-02-17 PROCEDURE — 90471 IMMUNIZATION ADMIN: CPT | Performed by: FAMILY MEDICINE

## 2025-02-17 PROCEDURE — 80061 LIPID PANEL: CPT | Performed by: FAMILY MEDICINE

## 2025-02-17 PROCEDURE — 99396 PREV VISIT EST AGE 40-64: CPT | Mod: 25 | Performed by: FAMILY MEDICINE

## 2025-02-17 PROCEDURE — 99214 OFFICE O/P EST MOD 30 MIN: CPT | Mod: 25 | Performed by: FAMILY MEDICINE

## 2025-02-17 PROCEDURE — 3078F DIAST BP <80 MM HG: CPT | Performed by: FAMILY MEDICINE

## 2025-02-17 PROCEDURE — 83036 HEMOGLOBIN GLYCOSYLATED A1C: CPT | Performed by: FAMILY MEDICINE

## 2025-02-17 PROCEDURE — 3074F SYST BP LT 130 MM HG: CPT | Performed by: FAMILY MEDICINE

## 2025-02-17 PROCEDURE — 80053 COMPREHEN METABOLIC PANEL: CPT | Performed by: FAMILY MEDICINE

## 2025-02-17 PROCEDURE — 82306 VITAMIN D 25 HYDROXY: CPT | Performed by: FAMILY MEDICINE

## 2025-02-17 PROCEDURE — 85025 COMPLETE CBC W/AUTO DIFF WBC: CPT | Performed by: FAMILY MEDICINE

## 2025-02-17 PROCEDURE — 1126F AMNT PAIN NOTED NONE PRSNT: CPT | Performed by: FAMILY MEDICINE

## 2025-02-17 PROCEDURE — 83970 ASSAY OF PARATHORMONE: CPT | Performed by: FAMILY MEDICINE

## 2025-02-17 PROCEDURE — G2211 COMPLEX E/M VISIT ADD ON: HCPCS | Performed by: FAMILY MEDICINE

## 2025-02-17 PROCEDURE — 36415 COLL VENOUS BLD VENIPUNCTURE: CPT | Performed by: FAMILY MEDICINE

## 2025-02-17 PROCEDURE — 82330 ASSAY OF CALCIUM: CPT | Performed by: FAMILY MEDICINE

## 2025-02-17 ASSESSMENT — PAIN SCALES - GENERAL: PAINLEVEL_OUTOF10: NO PAIN (0)

## 2025-02-17 NOTE — NURSING NOTE
Prior to immunization administration, verified patients identity using patient s name and date of birth. Please see Immunization Activity for additional information.     Screening Questionnaire for Adult Immunization    Are you sick today?   No   Do you have allergies to medications, food, a vaccine component or latex?   No   Have you ever had a serious reaction after receiving a vaccination?   No   Do you have a long-term health problem with heart, lung, kidney, or metabolic disease (e.g., diabetes), asthma, a blood disorder, no spleen, complement component deficiency, a cochlear implant, or a spinal fluid leak?  Are you on long-term aspirin therapy?   No   Do you have cancer, leukemia, HIV/AIDS, or any other immune system problem?   No   Do you have a parent, brother, or sister with an immune system problem?   No   In the past 3 months, have you taken medications that affect  your immune system, such as prednisone, other steroids, or anticancer drugs; drugs for the treatment of rheumatoid arthritis, Crohn s disease, or psoriasis; or have you had radiation treatments?   No   Have you had a seizure, or a brain or other nervous system problem?   No   During the past year, have you received a transfusion of blood or blood    products, or been given immune (gamma) globulin or antiviral drug?   No   For women: Are you pregnant or is there a chance you could become       pregnant during the next month?   No   Have you received any vaccinations in the past 4 weeks?   No     Immunization questionnaire answers were all negative.      Patient instructed to remain in clinic for 15 minutes afterwards, and to report any adverse reactions.     Screening performed by Monik Tavarez MA on 2/17/2025 at 10:57 AM.

## 2025-02-17 NOTE — PATIENT INSTRUCTIONS
Encouraged a low carbohydrate smaller portion diet increase aerobic activity and staying active.  Treatment options discussed including no medications, naltrexone, phentermine, Topamax, semaglutide, referral back to the weight clinic, referral to [facility] pharmacist etc.    Semaglutide is a selective glucagon-like peptide-1 (GLP-1) receptor agonist that increases glucose-dependent insulin secretion, decreases inappropriate glucagon secretion, slows gastric emptying; also acts in the areas of the brain involved in regulation of appetite and caloric intake.   The version of semaglutide authrorized for weight loss without diabetes is wegovy  It is meant to be an adjunct to a reduced-calorie diet and increased physical activity for chronic weight management in adults with an initial BMI of >=30 kg/m2 (obesity),      Administer by SUBQ injection into the abdomen, thigh, or upper arm at any time of day on the same day each week, with or without food. If changing the day of administration is necessary, allow >=48 hours between 2 doses. Rotate injection sites weekly if injecting in the same area of the body. Do not mix with other products (administer as separate injections). Avoid adjacent injections if administering other agents in the same area of the body. Solution should be clear; do not use if particulate matter and coloration are seen     Most common side effects are abdominal pain, constipation, diarrhea, nausea, vomiting, gallstones, pancreatitis, decreased appetite, altered taste, gastritis, flatulence, reflux etc. headache, pharyngitis other side effects can be low blood pressure, rash, hair loss, vitreous hemorrhage, UTIs, elevated liver tests, anxiety flu sprains, suicidal ideation etc.    1 risk of these medications can be development of thyroid cancer.  It is contraindicated to use with a personal or family history of medullary thyroid cancer or with anyone with multiple endocrine neoplasia.  If you should  develop a mass in the neck problems swallowing, trouble breathing, hoarseness that is persistent then we need to stop the med and reevaluate the symptoms before we move ahead.    At the low-dose initial dose do not expect any weight loss.  This is just to get your body used to the medication.  Over time as we titrate up the dose if there is no significant weight loss more than 5% then we need to de-escalate therapy.  Once reached ideal body weight less than 30 BMI to 27 BMI then we need to reevaluate if you need to continue on the med or go on the lowest effective maintenance dosing etc.  Some people may be able to come off the med completely and keep the weight off.  Others may come off the weight med and gain some weight back.  Many may gain all the weight back and more if off the medication ( due to insurance changes, cost, side effects, shortages etc) and this may end up being a lifelong medication.  Some may be able to be maintained on the lowest effective dosing.    [facility] ad see if cost acceptable     Med has to be held at least 2 weeks prior to any procedure involving sedation so as to decrease risk of aspiration of stomach contents into lungs     Patient Education   Preventive Care Advice   This is general advice given by our system to help you stay healthy. However, your care team may have specific advice just for you. Please talk to your care team about your preventive care needs.  Nutrition  Eat 5 or more servings of fruits and vegetables each day.  Try wheat bread, brown rice and whole grain pasta (instead of white bread, rice, and pasta).  Get enough calcium and vitamin D. Check the label on foods and aim for 100% of the RDA (recommended daily allowance).  Lifestyle  Exercise at least 150 minutes each week  (30 minutes a day, 5 days a week).  Do muscle strengthening activities 2 days a week. These help control your weight and prevent disease.  No smoking.  Wear  sunscreen to prevent skin cancer.  Have a dental exam and cleaning every 6 months.  Yearly exams  See your health care team every year to talk about:  Any changes in your health.  Any medicines your care team has prescribed.  Preventive care, family planning, and ways to prevent chronic diseases.  Shots (vaccines)   HPV shots (up to age 26), if you've never had them before.  Hepatitis B shots (up to age 59), if you've never had them before.  COVID-19 shot: Get this shot when it's due.  Flu shot: Get a flu shot every year.  Tetanus shot: Get a tetanus shot every 10 years.  Pneumococcal, hepatitis A, and RSV shots: Ask your care team if you need these based on your risk.  Shingles shot (for age 50 and up)  General health tests  Diabetes screening:  Starting at age 35, Get screened for diabetes at least every 3 years.  If you are younger than age 35, ask your care team if you should be screened for diabetes.  Cholesterol test: At age 39, start having a cholesterol test every 5 years, or more often if advised.  Bone density scan (DEXA): At age 50, ask your care team if you should have this scan for osteoporosis (brittle bones).  Hepatitis C: Get tested at least once in your life.  STIs (sexually transmitted infections)  Before age 24: Ask your care team if you should be screened for STIs.  After age 24: Get screened for STIs if you're at risk. You are at risk for STIs (including HIV) if:  You are sexually active with more than one person.  You don't use condoms every time.  You or a partner was diagnosed with a sexually transmitted infection.  If you are at risk for HIV, ask about PrEP medicine to prevent HIV.  Get tested for HIV at least once in your life, whether you are at risk for HIV or not.  Cancer screening tests  Cervical cancer screening: If you have a cervix, begin getting regular cervical cancer screening tests starting at age 21.  Breast cancer scan (mammogram): If you've ever had breasts, begin having  regular mammograms starting at age 40. This is a scan to check for breast cancer.  Colon cancer screening: It is important to start screening for colon cancer at age 45.  Have a colonoscopy test every 10 years (or more often if you're at risk) Or, ask your provider about stool tests like a FIT test every year or Cologuard test every 3 years.  To learn more about your testing options, visit:   .  For help making a decision, visit:   https://bit.ly/uc46088.  Prostate cancer screening test: If you have a prostate, ask your care team if a prostate cancer screening test (PSA) at age 55 is right for you.  Lung cancer screening: If you are a current or former smoker ages 50 to 80, ask your care team if ongoing lung cancer screenings are right for you.  For informational purposes only. Not to replace the advice of your health care provider. Copyright   2023 [employer]. All rights reserved. Clinically reviewed by the [facility]. micecloud 426162 - REV 01/24.  Learning About Stress  What is stress?     Stress is your body's response to a hard situation. Your body can have a physical, emotional, or mental response. Stress is a fact of life for most people, and it affects everyone differently. What causes stress for you may not be stressful for someone else.  A lot of things can cause stress. You may feel stress when you go on a job interview, take a test, or run a race. This kind of short-term stress is normal and even useful. It can help you if you need to work hard or react quickly. For example, stress can help you finish an important job on time.  Long-term stress is caused by ongoing stressful situations or events. Examples of long-term stress include long-term health problems, ongoing problems at work, or conflicts in your family. Long-term stress can harm your health.  How does stress affect your health?  When you are stressed, your body responds as though you are in danger. It  makes hormones that speed up your heart, make you breathe faster, and give you a burst of energy. This is called the fight-or-flight stress response. If the stress is over quickly, your body goes back to normal and no harm is done.  But if stress happens too often or lasts too long, it can have bad effects. Long-term stress can make you more likely to get sick, and it can make symptoms of some diseases worse. If you tense up when you are stressed, you may develop neck, shoulder, or low back pain. Stress is linked to high blood pressure and heart disease.  Stress also harms your emotional health. It can make you barber, tense, or depressed. Your relationships may suffer, and you may not do well at work or school.  What can you do to manage stress?  You can try these things to help manage stress:   Do something active. Exercise or activity can help reduce stress. Walking is a great way to get started. Even everyday activities such as housecleaning or yard work can help.  Try yoga or jadyn chi. These techniques combine exercise and meditation. You may need some training at first to learn them.  Do something you enjoy. For example, listen to music or go to a movie. Practice your hobby or do volunteer work.  Meditate. This can help you relax, because you are not worrying about what happened before or what may happen in the future.  Do guided imagery. Imagine yourself in any setting that helps you feel calm. You can use online videos, books, or a teacher to guide you.  Do breathing exercises. For example:  From a standing position, bend forward from the waist with your knees slightly bent. Let your arms dangle close to the floor.  Breathe in slowly and deeply as you return to a standing position. Roll up slowly and lift your head last.  Hold your breath for just a few seconds in the standing position.  Breathe out slowly and bend forward from the waist.  Let your feelings out. Talk, laugh, cry, and express anger when you  "need to. Talking with supportive friends or family, a counselor, or a sarah leader about your feelings is a healthy way to relieve stress. Avoid discussing your feelings with people who make you feel worse.  Write. It may help to write about things that are bothering you. This helps you find out how much stress you feel and what is causing it. When you know this, you can find better ways to cope.  What can you do to prevent stress?  You might try some of these things to help prevent stress:  Manage your time. This helps you find time to do the things you want and need to do.  Get enough sleep. Your body recovers from the stresses of the day while you are sleeping.  Get support. Your family, friends, and community can make a difference in how you experience stress.  Limit your news feed. Avoid or limit time on social media or news that may make you feel stressed.  Do something active. Exercise or activity can help reduce stress. Walking is a great way to get started.  Where can you learn more?  Go to https://www.[employer].net/patiented  Enter N032 in the search box to learn more about \"Learning About Stress.\"  Current as of: October 24, 2023  Content Version: 14.3    2024 [employer].   Care instructions adapted under license by your healthcare professional. If you have questions about a medical condition or this instruction, always ask your healthcare professional. [employer] disclaims any warranty or liability for your use of this information.       "

## 2025-02-17 NOTE — RESULT ENCOUNTER NOTE
Hello -    Here are my comments about your recent results:  -Normal red blood cell (hgb) levels, normal white blood cell count and normal platelet levels.  -A1C (diabetic test) is normal and indicates that your blood sugar has been in a normal range the last 3 months.  For additional lab test information, labtestsonline.org is an excellent reference..    Please let us know if you have any questions or concerns.     Regards,  Kerrie Proctor MD

## 2025-02-17 NOTE — PROGRESS NOTES
"The below note was dictated using voice recognition. Although reviewed after completion, some word and grammatical error may remain .          Preventive Care Visit  [facility]  Kerrie Proctor MD, Family Medicine  Feb 17, 2025  {Provider  Link to SmartSet :784217}    {PROVIDER CHARTING PREFERENCE:484570}    *** -Normal red blood cell (hgb) levels, normal white blood cell count and normal platelet levels.  -A1C (diabetic test) is normal and indicates that your blood sugar has been in a normal range the last 3 months.  ***hba1c 5.2  *** Clarifying a typo.  Hemoglobin A1c normal at 5.4.  Ionized calcium normal at 5.2.  Calcium mildly elevated at 10.5 awaiting rest of test.  Normal electrolytes and kidney function.  Glucose is slightly elevated in the prediabetic range  Vit d is wnl. Recommend 1000 units daily  Thyroid function is normal.  Triglycerides are elevated similar to prior.  HDL is low similar to prior.  LDL is elevated at 183.  Consider CT calcium score to assess coronary artery risk and weight loss will definitely help as well.  ALT is mildly elevated at 54 rest of the liver function tests are normal  Encouraged a low carbohydrate intake & regular aerobic activity***   Normal parathyroid   ***    Subjective   Arabella is a 44 year old, presenting for the following:  Physical and Cough (Started last night. Occasional cough in chest./)        2/17/2025     9:59 AM   Additional Questions   Roomed by IESHA.        Partner has been sick for about a week  Starting to get a little bit of a cough, \"I can feel it in my chest\".     HPI    Health Care Directive  Patient does not have a Health Care Directive: Discussed advance care planning with patient; information given to patient to review.      2/12/2025   General Health   How would you rate your overall physical health? Good   Feel stress (tense, anxious, or unable to sleep) To some extent   (!) STRESS CONCERN      2/12/2025   Nutrition   Three " or more servings of calcium each day? Yes   Diet: Regular (no restrictions)   How many servings of fruit and vegetables per day? 4 or more   How many sweetened beverages each day? 0-1         2/12/2025   Exercise   Days per week of moderate/strenous exercise 2 days   Average minutes spent exercising at this level 30 min   (!) EXERCISE CONCERN      2/12/2025   Social Factors   Frequency of gathering with friends or relatives Once a week   Worry food won't last until get money to buy more No   Food not last or not have enough money for food? No   Do you have housing? (Housing is defined as stable permanent housing and does not include staying ouside in a car, in a tent, in an abandoned building, in an overnight shelter, or couch-surfing.) Yes   Are you worried about losing your housing? No   Lack of transportation? No   Unable to get utilities (heat,electricity)? No         2/12/2025   Dental   Dentist two times every year? Yes         2/5/2024   TB Screening   Were you born outside of the [place]? No     Today's PHQ-2 Score:       2/17/2025     9:45 AM   PHQ-2 ( 1999 Pf[employer])   Q1: Little interest or pleasure in doing things 0   Q2: Feeling down, depressed or hopeless 1   PHQ-2 Score 1    Q1: Little interest or pleasure in doing things Not at all   Q2: Feeling down, depressed or hopeless Several days   PHQ-2 Score 1       Patient-reported           2/12/2025   Substance Use   Alcohol more than 3/day or more than 7/wk No   Do you use any other substances recreationally? No     Social History     Tobacco Use    Smoking status: Former     Current packs/day: 0.00     Average packs/day: 0.5 packs/day for 11.0 years (5.5 ttl pk-yrs)     Types: Cigarettes     Start date: 1/1/1999     Quit date: 11/15/2009     Years since quitting: 15.2     Passive exposure: Past    Smokeless tobacco: Never   Vaping Use    Vaping status: Never Used   Substance Use Topics    Alcohol use: Yes     Comment: 0-1 drink weekly    Drug use: No      Comment: thc gummies     {Provider  If there are gaps in the social history shown above, please follow the link to update and then refresh the note Link to Social and Substance History :[id number]}        2/12/2025   Breast Cancer Screening   Family history of breast, colon, or ovarian cancer? No / Unknown         2/5/2024   LAST FHS-7 RESULTS   1st degree relative breast or ovarian cancer No   Any relative bilateral breast cancer No   Any male have breast cancer No   Any ONE woman have BOTH breast AND ovarian cancer No   Any woman with breast cancer before 50yrs No   2 or more relatives with breast AND/OR ovarian cancer No   2 or more relatives with breast AND/OR bowel cancer No     {If any of the questions to the FHS7 are answered yes, consider referral for genetic counseling.    Additional indications for genetic referral include personal history of breast or ovarian cancer, genetic mutation in 1st degree relative which increases risk of breast cancer including BRCA1, BRCA2, POLINA, PALB 2, TP53, CHEK2, PTEN, CDH1, STK11 (per ACS) and/or 1st degree relative with history of pancreatic or high-risk prostate cancer (per NCCN):251855}   Mammogram Screening - Mammogram every 1-2 years updated in Health Maintenance based on mutual decision making        2/12/2025   STI Screening   New sexual partner(s) since last STI/HIV test? No     History of abnormal Pap smear: No - age 30- 64 PAP with HPV every 5 years recommended        Latest Ref Rng & Units 3/7/2022     1:16 PM 12/1/2015    12:00 AM 3/13/2014    12:00 AM   PAP / HPV   PAP  Negative for Intraepithelial Lesion or Malignancy (NILM)      PAP (Historical)   NIL  NIL    HPV 16 DNA Negative Negative      HPV 18 DNA Negative Negative      Other HR HPV Negative Negative        ASCVD Risk   The 10-year ASCVD risk score (Vipin LOU, et al., 2019) is: 1.6%    Values used to calculate the score:      Age: 44 years      Sex: Female      Is Non- :  No      Diabetic: No      Tobacco smoker: No      Systolic Blood Pressure: 115 mmHg      Is BP treated: No      HDL Cholesterol: 41 mg/dL      Total Cholesterol: 258 mg/dL    {Provider  REQUIRED FOR AWV Use the storyboard to review patient history, after sections have been marked as reviewed, refresh note to capture documentation:[id number]}   Reviewed and updated as needed this visit by Provider   Tobacco  Allergies  Meds  Problems  Med Hx  Surg Hx  Fam Hx            Past Medical History:   Diagnosis Date    BMI 32.0-32.9,adult 2022    Cyst of left ovary 2016    Endometriosis 10/06/2022    Intramural leiomyoma of uterus 2016    Iron deficiency anemia due to chronic blood loss 2022    Menorrhagia with regular cycle 2022    none     Prediabetes 2022     Past Surgical History:   Procedure Laterality Date    COLONOSCOPY  2022    HYSTERECTOMY SUPRACERVICAL Bilateral 2022    Procedure: Abdominal Supracervical Hysterectomy, Bilateral Salpingectomy, left oophorectomy, cystoscopy and pelvic washings.;  Surgeon: Kasandra Multani MD;  Location: [facility] OR     OB History    Para Term  AB Living   0 0 0 0 0 0   SAB IAB Ectopic Multiple Live Births   0 0 0 0 0     Lab work is in process  Labs reviewed in EPIC  BP Readings from Last 3 Encounters:   25 115/67   24 118/72   23 122/71    Wt Readings from Last 3 Encounters:   25 86.2 kg (190 lb)   24 87.9 kg (193 lb 12.8 oz)   23 87.5 kg (192 lb 12.8 oz)         Patient Active Problem List   Diagnosis    Family history of esophageal cancer    Family history of colonic polyps    Family history of factor V Leiden mutation    H/O abdominal supracervical subtotal hysterectomy    NAFLD (nonalcoholic fatty liver disease)    Elevated LFTs    Calculus of gallbladder without cholecystitis without obstruction    Class 1 obesity due to excess calories with serious comorbidity and body mass index  (BMI) of 33.0 to 33.9 in adult    Vitamin D deficiency    Serum calcium elevated    Dyslipidemia    Snoring    Digital mucinous cyst of finger of right hand    Multiple pigmented nevi     Past Surgical History:   Procedure Laterality Date    COLONOSCOPY  02/18/2022    HYSTERECTOMY SUPRACERVICAL Bilateral 07/01/2022    Procedure: Abdominal Supracervical Hysterectomy, Bilateral Salpingectomy, left oophorectomy, cystoscopy and pelvic washings.;  Surgeon: Kasandra Multani MD;  Location:  OR       Social History     Tobacco Use    Smoking status: Former     Current packs/day: 0.00     Average packs/day: 0.5 packs/day for 11.0 years (5.5 ttl pk-yrs)     Types: Cigarettes     Start date: 1/1/1999     Quit date: 11/15/2009     Years since quitting: 15.2     Passive exposure: Past    Smokeless tobacco: Never   Substance Use Topics    Alcohol use: Yes     Comment: 0-1 drink weekly     Family History   Problem Relation Age of Onset    Esophageal Cancer Mother 55        esophageal cancer    Blood Disease Mother         factor 5    Diabetes Father         type 2    Respiratory Father         emphasema    Colon Polyps Father         had colon removed    Other - See Comments Maternal Grandfather         ? colon cancer    Diabetes Paternal Grandmother          Current Outpatient Medications   Medication Sig Dispense Refill    COMPOUNDED NON-CONTROLLED SUBSTANCE (CMPD RX) - PHARMACY TO MIX COMPOUNDED MEDICATION Wegovy 0.5 mg weekly for 4 weeks 3 mL 1     No Known Allergies  Recent Labs   Lab Test 02/17/25  1109 08/12/24  0900 02/12/24  1111 01/29/24  1016 08/24/23  0839 02/10/23  1118   A1C 5.4  --  5.6  --   --  5.5   LDL  --  167* 178*  --   --  147*   HDL  --  41* 44*  --   --  44*   TRIG  --  251* 246*  --   --  307*   ALT  --  59* 129* 106*   < > 118*   CR  --  0.76 0.80 0.73  --  0.73   GFRESTIMATED  --  >90 >90 >90  --  >90   POTASSIUM  --  4.0 4.2 4.3  --  4.2   TSH  --   --  2.76  --   --  2.73    < > = values in this  interval not displayed.     BACKGROUND  44 yr yr old, , B+, former smoker, BMI more than 33, snoring, prediabetes, dyslipidemia, nonalcoholic fatty liver disease, elevated LFTs AST ALT seen by hepatologist, fibrosis scan  was stage 0-I with grade 3 steatosis, asymptomatic gallstones, elevated calcium, vitamin D deficiency, right dorsal radial nail for site of right middle finger digital mucinous cyst half centimeter asymptomatic, multiple pigmented nevi under care of dermatology last seen them , history of abdominal supracervical subtotal hysterectomy for intramural leiomyomas left ovarian cyst abnormal uterine bleeding and prior menorrhagia with regular cycles resulting in iron deficiency anemia, family history of colonic polyps questionable colon cancer, dad with a colectomy history due to polyps and/serrated adenoma, and maternal grandfather with possible colon cancer, mother with history of esophageal cancer, colonoscopy in  and self was normal advise recheck every 5 years due again in , family history of factor V Leiden mutation but negative in self,  opted to defer  consult, previously under care of Barbara Ha, seen by GYN  in 2018 for fibroids following episode of acute pain that made her seek ER care & ct scan ordered. Was told fibroids were unlikely cause of the acute severe pain at that time. Options discussed and was to think about it and get back to gynecology at that time.  [place]  negative.     Seen first time by this provider on 22 for preventive health and additional concerns. Discussed self breast check regularly. To consider referral to  given fh of esophageal cancer , colon polyps, factor 5 if insurance would cover. Mammogram due and referral placed. Was to return for a Pelvic / PAP when not on her period. Healthcare maintenance reviewed. To consider working on healthcare directives, & honoring choices given. Noted COVID-vaccine including  booster and flu shot up-to-date. Labs done. BMI 32:  Encouraged a healthy diet, frequent small meals, less carbohydrates more Mediterranean-style.  To try to lose at least 10% of total body weight over time to be healthier.  After the visit hemoglobin A1c came back elevated at 5.7 suggesting prediabetes, recommended rechecking in 6 months to 1 year. Due to prior left ovarian cyst, fibroids of uterus and heavy periods, a repeat pelvic ultrasound was ordered. After the visit hemoglobin came back showing anemia with hemoglobin 9.8 and MCH MCV suggestive of iron deficiency, iron and ferritin came back low with ferritin of 9 & iron of 16, suspected from heavy periods.  Was asymptomatic.  Recommended to increase iron in diet, consider taking over-the-counter iron daily and recheck in 3 months with CBC, iron, ferritin, B12, reticulocyte & peripheral smear.  For mole on nose and on right leg and multiple pigmented nevi on skin of face, neck, back, chest, abdomen & extremities,  referred to dermatology for skin check and mole removal. Discussed family history of esophageal cancer and colonic polyps.  Advised to Increase fiber in diet and referred to GI for screening colonoscopy.  Was to consider a  consultation. Family history of factor V Leiden mutation  & testing done after genetic form signed as could help make recommendations in the future with regard to heavy periods and hormonal treatment.  If positive for clotting disorder would avoid estrogen containing compounds.  Labs came back showing a mildly elevated LDL with ASCVD risk of 2%, CMP normal TSH normal ferritin 9 iron 16, hemoglobin 9.8, AST minimally elevated, glucose minimally elevated, hemoglobin A1c 5.7. Mammogram done 1/28 was normal. Pelvic ultrasound on 2/8/2022 showed fibroid filled uterus ovaries not visible and referred to gynecology. Seen by gynecology virtually on 2/11/2022 and advised an MRI scheduled for 12 March and an EMB scheduled for the  22nd and was considering a hysterectomy given had no plans to start a family.   Colonoscopy done 2/18/2022 was normal recommended recheck in 10 years and advised to find out more information on dad's reason for colectomy given history of polyps to see if genetic testing could be done and also to check for celiac given iron deficiency anemia even if probably due to heavy menses.       Seen 3/7/22 for a pap.  Was to continue iron supplement daily and recheck iron labs & for celiac in 3 months.  Continued on psyllium to prevent constipation from iron. Iron deficiency anemia suspected due to chronic loss due to heavy periods, suspected due to large fibroids. History of ovarian cyst but unable to see ovaries on most recent ultrasound due to fibroid distortion. Family history of factor V Leiden with a recent factor V testing was negative. Discussed Family history of dad having had a colectomy for many serrated adenoma polyps, sister also with history of colonic polyps.  Recent colonoscopy on 2/18/2022 was normal and recommended recheck in 10 years.  GI did recommend checking for celiac and this was to be added to future labs. Given family history though would recommend  consult to assess risk of a genetic polyposis condition.  If unable to do genetic testing to consider colonoscopy every 5 years instead to be safe. Discussed genetic testing may be helpful also given family history of esophageal cancer. Was to continue omega for mildly elevated LDL.  Overall cardiovascular risk was low. Given history of prediabetes and BMI, to continue with fiber intake and eating healthy and losing weight to lower cardiovascular & cancer risk. Discussed could refer to hematology if hemoglobin did not go adequately up post surgery.   Seen by gyn 3/22 and options discussed and planning n MATTHEW, B/l salpingectomy and cystoscopy but to leave ovaries in place unless something found intraoperatively. Planned for iv iron at time of  surgery.      On 4/15/21 cbc noted improved to hb of 13.4, celiac testing was negative. Peripheral smear was normal, iron improved form 16 to 36, ferritin improved to 38 & vit B 12 was 562 , wnl.   Seen by derm 4/28/22 and had a shave biopsy of nose lesion and noted other benign lesions.  Seen 6/3/22 for preop for hysterectomy.  CMP showed elevated ALT.  Glucose is elevated.  Hemoglobin A1c in normal range.  Had a normal CBC and cleared for procedure.  On 7/1/22 underwent Abdominal supracervical hysterectomy, bilateral salpingectomy, left oophorectomy, cystoscopy, pelvic washings, for abnormal uterine bleeding, Fibroid uterus w/ bulk symptoms & suspected bilateral hydrosalpinx. Post-operative diagnosis was Fibroid uterus & Endometriosis with very large endometrioma. Pathology showed Uterus, supracervical hysterectomy: Benign endometrial polyp on a background of inactive-type endometrium, negative for hyperplasia or atypia, Leiomyomas, Uterine serosal endometriosis and adhesions. Left ovary and fallopian tube, salpingo-oophorectomy: Ovary with a benign endometriotic cyst & Fallopian tube with endometriosis, Right fallopian tube, salpingectomy: Fallopian tube with endometriosis. Uterus, myomectomy: -Leiomyoma. Pelvic washings were normal. Post op Hb was 10. Seen by gyn 7/18/22 for 2 week post op check, and 8/17/22 for 6 week post op check and noted was doing well.      Seen 10/6/22 Iron def was on iron till had hysterectomy in done in July. Off the iron post op and here for recheck, feeling well. Hx of abdominal supracervical subtotal hysterectomy on 7/1/22, bilateral salpingectomy, left oophorectomy, cystoscopy, pelvic washings. Pathology showed Uterus, supracervical hysterectomy: Benign endometrial polyp on a background of inactive-type endometrium, negative for hyperplasia or atypia, Leiomyomas, Uterine serosal endometriosis and adhesions. Left ovary and fallopian tube, salpingo-oophorectomy: Ovary with a benign  endometriotic cyst & Fallopian tube with endometriosis, Right fallopian tube, salpingectomy: Fallopian tube with endometriosis. Uterus, myomectomy: -Leiomyoma. Pelvic washings were normal. Post op Hb was 10. Seen by gyn 7/18/22 for 2 week post op check, and 8/17/22 for 6 week post op check and noted was doing well. Advised pap every 5 yrs,as cervix still in place,  due 2027 and to see gyn if should have pain given the hx of endometriosis. BMI 33, weight up since last visit as not been able to exercise much due to surgery till now. Working on a couch to [place] program with sister. Hx of prediabetes,    Elevated ALT, to consider u/s liver. FH colonic polyps, had colonoscopy in 2022 was normal advised recheck in 2025. FH of esophageal cancer, FH of factor 5, Opted no genetic testing for now. Noted a potential exposure to Covid notified on phone but tested negative that am & was asymptomatic so far. Partner had Covid recently and tested positive till 2 weeks prior.was given the flu and Moderna COVID booster.  Labs later showed BMP with elevated glucose, LFTs with elevated AST and ALT, ferritin and CBC and hemoglobin A1c were normal.  Ultrasound done to evaluate liver test on 10/24/2022 showed asymptomatic gallstones and hepatic steatosis and referred to GI.  Mammogram done 1/31/2023 was normal.     Seen 2/10/23 for preventive physical and follow-up from last visit. Breast exam normal to continue with self checks regularly.  Mammogram normal 1/2023.  Pap HPV due in 2027 given had a supracervical hysterectomy and cervix still in place. History of prior endometrioma removal along with fibroids if should have pain to follow-up with Gyn. Noted some mild bleeding here and there but if persisted or got worse to contact gynecology. Healthcare maintenance reviewed. To consider working on healthcare directives. Vaccines for COVID, flu and Tdap up-to-date. To consider hepatitis B vaccination, later lab work for GI showed was not  immune to hepatitis B. BMI improved to 32, to continue with healthy lifestyle increased physical activity low-carb intake and healthy diet. Elevated LFTs liver tests and ultrasound showed hepatic steatosis/fatty liver.  To see GI on the 13th and labs for them released to be done. Gallstones appeared to be asymptomatic, to monitor for pain after a fatty meal.  Sometimes gallstones could occur with rapid weight loss. History of prior prediabetes, HBa1c improved. Declined a  referral given family history of colonic polyps and esophageal cancer. Noted did not factor V Leiden deficiency on testing given family history.  Was to return in 1 year for preventive physical and sooner on an office visit for any new concerns.     B12 came back normal.  LDL was elevated but ASCVD risk was low at 1.3%.  Abnormal TSH hemoglobin A1c.  Was not immune to hepatitis B.  Seen by hepatologist 2/13/2023 for NAFLD.  Fibrosis scan was stage 0-I grade 3 steatosis.  Recommended FIB 4 score yearly with PCP.  Advised slow gradual weight loss.  Okay to start a statin if needed despite elevated LFTs.  To consider GLP-1 inhibitor to help with insulin resistance and weight loss.  Advise aggressive optimization of cholesterol glucose lipids.  Increase physical activity additional test done to rule out other causes and in August alpha-1 antitrypsin was normal F-actin was normal HUMAIRA was normal negative celiac panel AST was 77 .  On 1/29/2024 noted normal INR, AST 49  BMP showed calcium of 10.2 normal CBC and platelets.  Visited with a hepatologist virtually on 2/7/2024 for metabolic associated fatty liver disease FIB 4 score was 0.78.  Advise gradual weight loss, aggressive cholesterol glucose control evaluate for sleep apnea, recheck FibroScan or elastogram in 5 years follow-up with GI as needed.  [place] 2/5/2024 was normal.  [place]  negative    Seen 2/12/24 *** or preventive physical and follow-up.  Breast exam normal  continue self check regularly, mammogram done on 5 February showed dense breast which may miss tiny things, to continue with annual screening.  Pap HPV due 2027 given had a supracervical hysterectomy and cervix still in place.  History of prior endometrioma if should have worsening pain persistent pain vaginal bleeding then to contact gynecology.  Recent discomfort in January may have been related to that or could also have been due to constipation due to change in diet habits etc.  Colon cancer screening due again in 2027.  Does have family history of colon polyps in dad and may have had history of colon cancer maternal grandfather to clarify history and get back to us.  Consider  referral given family history of cancer if covered by insurance, defers for now  Healthcare maintenance reviewed.  Consider working on healthcare directives and honoring choices given to review.  Vaccines reviewed and given Tdap today.  Due for hepatitis A and B and Twinrix No. 1 given today, to get the second shot in 2 months and the third shot, 4 months after the second.  Fasting lab work today and will make further recommendations once those are reviewed.  -Ferritin (iron) level is normal.     BMI 33 with history of prediabetes, dyslipidemia,  metabolic liver disease & elevated LFTs.  Encouraged low carbohydrate diet increase physical activity and weight loss. Discussed weight loss medications reported not covered by insurance.  Offered nutritionist and referral to weight specialist will hold off for now and recommit to dietary and lifestyle changes.  LDL( is elevated, HDL(good) cholesterol level is low and triglycerides are elevated which can increase heart disease risk.  The 10-year ASCVD risk score (Vipin DK, et al., 2019) is: low at 1.6%. A diet high in fat and simple carbohydrates, genetics and being overweight can contribute to this. ADVISE: exercising 150 minutes of aerobic exercise per week (30 minutes for 5 days per  week or 50 minutes for 3 days per week are options), eating a low saturated fat/low carbohydrate diet, and omega-3 fatty acids (fish oil) 2230-6654 mg daily are helpful to improve this. In 6 months, to recheck your fasting cholesterol panel by scheduling a lab-only appointment. AST and ALT are elevated rest of liver tests normal. Normal kidney function and lytes. -TSH (thyroid stimulating hormone) level is normal which indicates normal thyroid function.     Offered referral to sleep specialist given history of snoring defers for now,  recommend sleeping on side and weight loss will help.     History of prediabetes we will check hemoglobin A1c today. Glucose is slight elevated but HBA1c is normal.No prediabetes currently     History of metabolic fatty liver disease.  Liver function normal without stigmata of advanced liver disease. FibroScan in 2/2023 showing Stage 0-1, 3.3 kilopascals. Grade 3 steatosis. Recently seen by hepatology & FIB-4 score of 0.78. will check yearly.   Recommend a slow gradual weight loss. On-going aggressive risk modification with yearly screening for cholesterol\, blood glucose and sleep apnea. No contraindication to starting a statin with LFT elevations if indicated. Optimize blood glucose as needed. Could consider GLP-1 inhibitor for both insulin resistance and help with weight loss ( reports checked and meds not covered). Encouraged to improve nutrition: continue limiting carbohydrates, especially simple carbohydrates, and following Mediterranean eating pattern, increase physical activity as able, ideally 150 minutes weekly, limit alcohol intake to not more than 3 drinks a week ( reported barely drinks), & to follow up with non-invasive elastography or FibroScan in 5 years ( 2027)  & with Hepatology as needed.  Counseled against THC gummy use as it may also worsen calorie intake     Asymptomatic gallstones gradual weight loss will prevent these from flaring up.     Recent labs showed  mildly elevated calcium may be artifact but will check vitamin D parathyroid to be sure. Calcium is elevated. Ionized calcium is elevated at 5.3. Parathyroid is normal vitamin D is low.. Recommend replacing vitamin D 5000 IU daily for 6 weeks and then 1000 IU daily to maintain levels. Then in 2-3 months,  to schedule a lab only appointment to recheck Vitamin D calcium etc levels.      Has a digital mucinous cyst right middle finger near nail fold that is currently not causing any pain or affecting functioning.  Monitor for now if gets rapidly bigger or painful or draining or red to contact us.  Monitor for skin/nail color changes     Multiple pigmented nevi, seen by dermatology 4/2022, a lesion removed nose returned, was benign, has a new 2 mm itchy brown raised mole ventral surface mid right wrist that looks benign under magnification.  Monitor for now and follow-up with dermatology in 2025 as discussed previously with them     Could consider referral to  given family history of esophageal cancer, possible colon cancer, colonic polyps.     Reviewed prior testing negative for factor V Leiden deficiency( +family history)     Return in 1 year for preventive physical and sooner in an office visit for any new concerns  ***  CURRENTLY     *** or preventive physical and follow-up.  Healthcare maintenance reviewed.  Consider working on healthcare directives and honoring choices given to review.    Breast no concerns, ok to exam. Yearly mammogram due, done on 5 February showed exremely dense breast which may miss tiny things, to continue with annual screening.  Optison discusse dto sched 3 dmamo and mri   [employer] may not pay   Risk benef discuss    Pap HPV due 2027 given had a supracervical hysterectomy and cervix still in place.    History of prior endometrioma if should have worsening pain persistent pain vaginal bleeding then to contact gynecology.    Colon cancer screening due again in 2027.  Does have family  history of colon polyps in dad and may have had ? history of colon cancer maternal grandfather to clarify history and get back to us.  Reports only polyos.  Consider  referral given family history of cancer if covered by insurance, defers for now    Vaccines reviewed and given Tdap today.  Due for hepatitis A and B and Twinrix No. 1 given today, to get the second shot in 2 months and the third shot, 4 months after the second.  Gt twnrix 2/12/24  But then only given hep B in aprila dn aug  Dsicussed  Will do hep a # 2 toda ***     Fasting lab work today and will make further recommendations once those are reviewed.      Cough *** partner sick simila symp 1 week  Sarte dlast night in slef.. patner eg COVID . no fever. Temp today 99 ear. No fever or chills, no headache or dizziness, no double or blurry vision, no facial pain, earache, sore throat, runny nose, post nasal drip, no trouble hearing, smelling, tasting or swallowing,  feel a bit congested in chest and occ dry cough this am, no chest pain, trouble breathing or palpitations, No abdominal pain, heart burn, reflux, nausea or vomiting or diarrhea or constipation, no blood in stools or black stools, no weight loss or night sweats. No dysuria, hematuria, frequency, urgency, hesitancy, incontinence, No pelvic complaints. No leg swellingor joint pain. No rash.opts out of testing  Oitor stay home  No tetsing fo rnow  Discssed rx, wha tto mon, red fla sign etc       BMI 33 with history of prediabetes, dyslipidemia,  metabolic liver disease & elevated LFTs.  Encouraged low carbohydrate diet increase physical activity and weight loss. Discussed weight loss medications reported not covered by insurance.  Offered nutritionist and referral to weight specialist will hold off for now and recommit to dietary and lifestyle changes.      Offered referral to sleep specialist given history of snoring defers for now,  recommend sleeping on side and weight loss will  help.    LDL( is elevated, HDL(good) cholesterol level is low and triglycerides are elevated which can increase heart disease risk.  The 10-year ASCVD risk score (Vipin LOU, et al., 2019) is: low at 1.6%. A diet high in fat and simple carbohydrates, genetics and being overweight can contribute to this. ADVISE: exercising 150 minutes of aerobic exercise per week (30 minutes for 5 days per week or 50 minutes for 3 days per week are options), eating a low saturated fat/low carbohydrate diet, and omega-3 fatty acids (fish oil) 9436-3349 mg daily are helpful to improve this. In 6 months, to recheck your fasting cholesterol panel by scheduling a lab-only appointment. AST and ALT are elevated rest of liver tests normal. Normal kidney function and lytes. -TSH (thyroid stimulating hormone) level is normal which indicates normal thyroid function.     Better in aug  The 10-year ASCVD risk score (Vipin LOU, et al., 2019) is: 1.6%    Values used to calculate the score:      Age: 44 years      Sex: Female      Is Non- : No      Diabetic: No      Tobacco smoker: No      Systolic Blood Pressure: 115 mmHg      Is BP treated: No      HDL Cholesterol: 41 mg/dL      Total Cholesterol: 258 mg/dL       History of prediabetes HBA1c is normal.       History of metabolic fatty liver disease.  Liver function normal without stigmata of advanced liver disease. FibroScan in 2/2023 showing Stage 0-1, 3.3 kilopascals. Grade 3 steatosis. Recently seen by hepatology & FIB-4 score of 0.78. will check yearly.   Recommend a slow gradual weight loss. On-going aggressive risk modification with yearly screening for cholesterol\, blood glucose and sleep apnea. No contraindication to starting a statin with LFT elevations if indicated. Optimize blood glucose as needed. Could consider GLP-1 inhibitor for both insulin resistance and help with weight loss ( reports checked and meds not covered). Encouraged to improve nutrition: continue  limiting carbohydrates, especially simple carbohydrates, and following Mediterranean eating pattern, increase physical activity as able, ideally 150 minutes weekly, limit alcohol intake to not more than 3 drinks a week ( reported barely drinks), & to follow up with non-invasive elastography or FibroScan in 5 years ( 2027)  & with Hepatology as needed.  Counseled against THC gummy use as it may also worsen calorie intake    Discussed BMI with comorbid ***   Encouraged a low carbohydrate smaller portion diet increase aerobic activity and staying active.  Treatment options discussed including no medications, naltrexone, phentermine, Topamax, semaglutide, referral back to the weight clinic, referral to [facility] pharmacist etc.  ***    Semaglutide is a selective glucagon-like peptide-1 (GLP-1) receptor agonist that increases glucose-dependent insulin secretion, decreases inappropriate glucagon secretion, slows gastric emptying; also acts in the areas of the brain involved in regulation of appetite and caloric intake.   The version of semaglutide authrorized for weight loss without diabetes is wegovy  It is meant to be an adjunct to a reduced-calorie diet and increased physical activity for chronic weight management in adults with an initial BMI of >=30 kg/m2 (obesity),      Administer by SUBQ injection into the abdomen, thigh, or upper arm at any time of day on the same day each week, with or without food. If changing the day of administration is necessary, allow >=48 hours between 2 doses. Rotate injection sites weekly if injecting in the same area of the body. Do not mix with other products (administer as separate injections). Avoid adjacent injections if administering other agents in the same area of the body. Solution should be clear; do not use if particulate matter and coloration are seen     For Wegovy: After removal of the pen cap, the needle will be hidden inside the needle cover. To begin injection, press the needle  cover firmly against the skin. Once injected, continue to press the device against the skin until the yellow bar has stopped moving. Then, remove the needle from the skin.     Recommend slow titration up to the lowest effective dose minimize side effects.    Week of Semaglutide (Wegovy) Therapy a Dose (mg) Once Weekly a   Weeks 1 through 4 0.25 mg once weekly   Weeks 5 through 8 0.5 mg once weekly   Weeks 9 though 12 1 mg once weekly   a If a dose is not tolerated, consider delaying further dose increases for 4 weeks.     Most common side effects are abdominal pain, constipation, diarrhea, nausea, vomiting, gallstones, pancreatitis, decreased appetite, altered taste, gastritis, flatulence, reflux etc. headache, pharyngitis other side effects can be low blood pressure, rash, hair loss, vitreous hemorrhage, UTIs, elevated liver tests, anxiety flu sprains, suicidal ideation etc.    1 risk of these medications can be development of thyroid cancer.  It is contraindicated to use with a personal or family history of medullary thyroid cancer or with anyone with multiple endocrine neoplasia.  If you should develop a mass in the neck problems swallowing, trouble breathing, hoarseness that is persistent then we need to stop the med and reevaluate the symptoms before we move ahead.    At the low-dose initial dose do not expect any weight loss.  This is just to get your body used to the medication.  Over time as we titrate up the dose if there is no significant weight loss more than 5% then we need to de-escalate therapy.  Once reached ideal body weight less than 30 BMI to 27 BMI then we need to reevaluate if you need to continue on the med or go on the lowest effective maintenance dosing etc.  Some people may be able to come off the med completely and keep the weight off.  Others may come off the weight med and gain some weight back.  Many may gain all the weight back and more if off the medication ( due to insurance changes,  cost, side effects, shortages etc) and this may end up being a lifelong medication.  Some may be able to be maintained on the lowest effective dosing.    Prescription sent into pharmacy of choice.  Counseled that it may need to go to a prior Auth process and may not be immediately available at the pharmacy to check with your pharmacist.  Understands that if insurance should change and it is no longer covered then weight gain may be possible off the medication.  Counseled that without insurance coverage it may cost upwards of $1000 a pen      Med has to be held at least 2 weeks prior to any procedure involving sedation so as to decrease risk of aspiration of stomach contents into lungs      Asymptomatic gallstones gradual weight loss will prevent these from flaring up.     Recent labs showed mildly elevated calcium may be artifact but will check vitamin D parathyroid to be sure. Calcium is elevated. Ionized calcium is elevated at 5.3. Parathyroid is normal vitamin D is low.. Recommend replacing vitamin D 5000 IU daily for 6 weeks and then 1000 IU daily to maintain levels. Then in 2-3 months,  to schedule a lab only appointment to recheck Vitamin D calcium etc levels.   *** check today     Vit d def *** not jack   ***       Has a digital mucinous cyst right middle finger near nail fold that is currently not causing any pain or affecting functioning.  Monitor for now if gets rapidly bigger or painful or draining or red to contact us.  Monitor for skin/nail color changes  Improved      Multiple pigmented nevi, seen by dermatology 4/2022, a lesion removed nose returned, was benign, has a new 2 mm itchy brown raised mole ventral surface mid right wrist that looks benign under magnification.  Monitor for now and follow-up with dermatology in 2025 as discussed previously with them  Will sched  No ocncersn     Could consider referral to  given family history of esophageal cancer, possible colon cancer, colonic  "polyps.  Opte dout      Reviewed prior testing negative for factor V Leiden deficiency( +family history)   ***       Review of Systems  Constitutional, HEENT, cardiovascular, pulmonary, GI, , musculoskeletal, neuro, skin, endocrine and psych systems are negative, except as otherwise noted.     Objective    Exam  /67 (BP Location: Right arm, Patient Position: Chair, Cuff Size: Adult Regular)   Pulse 82   Temp 99  F (37.2  C) (Tympanic)   Resp 16   Ht 1.613 m (5' 3.5\")   Wt 86.2 kg (190 lb)   LMP 03/12/2022 (Exact Date)   SpO2 99%   BMI 33.13 kg/m     Estimated body mass index is 33.13 kg/m  as calculated from the following:    Height as of this encounter: 1.613 m (5' 3.5\").    Weight as of this encounter: 86.2 kg (190 lb).    Physical Exam  {Exam Choices (Optional):235205}        Signed Electronically by: Kerrie Proctor MD  " cancer screening due again in 2027. Did have family history of colon polyps in dad and may have had history of colon cancer maternal grandfather to clarify history and get back to us. To consider  referral given family history of cancer if covered by insurance. Healthcare maintenance reviewed. To consider working on healthcare directives and honoring choices given to review.  Vaccines reviewed and given Tdap. Due for hepatitis A and B and Twinrix No. 1 given, to get the second shot in 2 months and the third shot, 4 months after the second. Labs done.   BMI 33 with history of prediabetes, dyslipidemia, metabolic liver disease & elevated LFTs.  Encouraged low carbohydrate diet, increased physical activity and weight loss. Discussed weight loss medications, reported not covered by insurance.  Offered nutritionist and referral to weight specialist but held off & was to recommit to dietary and lifestyle changes.  LDL elevated, HDL low & triglycerides  elevated. The 10-year ASCVD risk score (Vipin LOU, et al., 2019) was low at 1.6%. AST and ALT elevated rest of liver tests normal. Normal kidney function and lytes & TSH.   Offered referral to sleep specialist given history of snoring but declined, recommend sleeping on side and weight loss would help. History of prediabetes, glucose slightly elevated but HB A1c normal.   History of metabolic fatty liver disease.  Liver function normal without stigmata of advanced liver disease. FibroScan in 2/2023 showing Stage 0-1, 3.3 kilopascals. Grade 3 steatosis. Seen by hepatology & FIB-4 score of 0.78. to check yearly.   Recommend a slow gradual weight loss. On-going aggressive risk modification with yearly screening for cholesterol, blood glucose and sleep apnea. No contraindication to starting a statin with LFT elevations if indicated. Optimize blood glucose as needed. Could consider GLP-1 inhibitor for both insulin resistance and help with weight loss ( reported checked  and meds not covered). Encouraged to improve nutrition: continue limiting carbohydrates, especially simple carbohydrates, and following Mediterranean eating pattern, increase physical activity as able, ideally 150 minutes weekly, limit alcohol intake to not more than 3 drinks a week ( reported barely drank, & to follow up with non-invasive elastography or FibroScan in 5 years ( 2027)  & with Hepatology as needed.  Counseled against THC gummy use as it could also worsen calorie intake  Asymptomatic gallstones gradual weight loss would prevent these from flaring up.  Recent labs showed mildly elevated calcium. Ionized calcium was elevated at 5.3. Parathyroid normal vitamin D low.. Recommend replacing vitamin D 5000 IU daily for 6 weeks and then 1000 IU daily to maintain levels. Then to recheck in a lab only appointment in 2 months.  Had a digital mucinous cyst right middle finger near nail fold that was not causing any pain or affecting functioning. To monitor for skin/nail color changes  Multiple pigmented nevi, seen by dermatology 4/2022, a lesion removed nose returned, was benign, had a new 2 mm itchy brown raised mole ventral surface mid right wrist that looked benign under magnification. To monitor for now and follow-up with dermatology in 2025 as discussed previously with them  Could consider referral to  given family history of esophageal cancer, possible colon cancer, colonic polyps. Reviewed previously tested negative for factor V Leiden deficiency( +family history). Was to return in 1 year for preventive physical and sooner in an office visit for any new concerns    Labs showed elevated glucose, elevated LDL, ASCVD risk of 1.6%. normal TSH, cbc, ferritin, but AST & ALT elevated and advised recheck 6 weeks. Calcium was elevated vit d low and advised vit d replacement and recheck. On 4/15/25 given Hep B instead of Twinrix # 2 & same on 8/12/24. On 8/12/24 noted AST normal, ALT elevated at 59, LDL  went form 178 to 167 & HDL 41, total cholesterol from 270 to 258 & ASCVD unchanged at 1.6 %. Glucose was 114, normal calcium, ionized calcium was elevated at 5.3, with normal pth, vit d improved from 13 to 25 was to continue vit d 2000 units & consider endo referral. No response received    CURRENTLY   Here for preventive physical and follow-up.  Healthcare maintenance reviewed.  Consider working on healthcare directives and honoring choices given to review.    Breast no concerns, ok to exam. Yearly mammogram due, in 2024 noted extremely dense breasts which could miss tiny things, to continue with annual screening. Options discussed to sched 3d Mammo and mri & check with insurance before scheduling.    Pap HPV due 2027 given had a supracervical hysterectomy and cervix still in place.    History of prior endometrioma, No symptoms  currently, if should have pain, or vaginal bleeding then to contact gynecology.     Colon cancer screening due again in 2027.  Does have family history of colon polyps in dad and may have had ? history of colon cancer maternal grandfather. Today reports only polyps.  Consider  referral given family history of cancer if covered by insurance, defers for now    Vaccines reviewed and utd for flu & COVID.  Noted had Twinrix No. 1 in 2/2024 but then only given Hep B in 4/2024 & 8/2024 instead of Twinrix, will do Hep A # 2 today to complete series and consider titers next year given unconventional dosing.     Is fasting for lab work today and will make further recommendations once those are reviewed.    Notes a cough 1 day. Partner was sick with similar symptoms 1 week ago but was negative for COVID. She has had no fever. Temp today 99 ear. No chills, no headache or dizziness, no double or blurry vision, no facial pain, earache, sore throat, runny nose, post nasal drip, no trouble hearing, smelling, tasting or swallowing, feels a bit congested in chest and occ dry cough this am, no chest  pain, trouble breathing or palpitations, No abdominal pain, heart burn, reflux, nausea or vomiting or diarrhea or constipation, no blood in stools or black stools, no weight loss or night sweats. No dysuria, hematuria, frequency, urgency, hesitancy, incontinence, No pelvic complaints. No leg swelling or joint pain. No rash. Options discussed, opts out of testing, will monitor at home and discuss symptomatic care and when to return for red flag symptoms/ signs.      BMI 33 with history of prediabetes, dyslipidemia, metabolic liver disease & elevated LFTs.  Encouraged low carbohydrate diet increase physical activity and weight loss. Discussed weight loss medications reported not covered by insurance.  Offered nutritionist and referral to weight specialist will hold off for now and recommit to dietary and lifestyle changes. Will look see if ordering GLP 1 compounded will be affordable or not     History of snoring, Offered referral to sleep specialist  defers for now,  recommend sleeping on side and weight loss will help.    Elevated LDL, low HDL, elevated TG, A diet high in fat and simple carbohydrates, genetics and being overweight can contribute to this. The 10-year ASCVD risk score (Vipin LOU, et al., 2019) is: 1.6%    Values used to calculate the score:      Age: 44 years      Sex: Female      Is Non- : No      Diabetic: No      Tobacco smoker: No      Systolic Blood Pressure: 115 mmHg      Is BP treated: No      HDL Cholesterol: 41 mg/dL      Total Cholesterol: 258 mg/dL    History of resolved prediabetes will check HB A1c again today     History of metabolic fatty liver disease.  Liver function normal without stigmata of advanced liver disease. FibroScan in 2/2023 showing Stage 0-1, 3.3 kilopascals. Grade 3 steatosis. Seen by hepatology & FIB-4 score of 0.78. to check yearly.   Recommend a slow gradual weight loss. On-going aggressive risk modification with yearly screening for  "cholesterol\, blood glucose and sleep apnea. No contraindication to starting a statin with LFT elevations if indicated. Optimize blood glucose as needed. Could consider GLP-1 inhibitor for both insulin resistance and help with weight loss. Encouraged to improve nutrition: continue limiting carbohydrates, especially simple carbohydrates, and following Mediterranean eating pattern, increase physical activity as able, ideally 150 minutes weekly, limit alcohol intake to not more than 3 drinks a week ( reported barely drinks), & to follow up with non-invasive elastography or FibroScan in 5 years ( 2027)  & with Hepatology as needed.  Counseled against THC gummy use as it may also worsen calorie intake.      Asymptomatic gallstones gradual weight loss will prevent these from flaring up.     Hx of elevated calcium, & ionized calcium with normal Parathyroid & hx of vit d def. Took vit d a while. Will recheck today and go from there. Consider endo consult.     Hx of vit d def.took for a while then stopped. Will recheck today.     Has a digital mucinous cyst right middle finger near nail fold improved      Multiple pigmented nevi, seen by dermatology 4/2022, a lesion removed nose returned, was benign, no new concerns due for follow-up with dermatology in 2025 as discussed previously with them.    Negative for factor V Leiden deficiency( +family history)     Family history of esophageal cancer, possible colon cancer, & fh of colonic polyps. Declines a  referral     Review of Systems  Constitutional, HEENT, cardiovascular, pulmonary, GI, , musculoskeletal, neuro, skin, endocrine and psych systems are negative, except as otherwise noted.     Objective    Exam  /67 (BP Location: Right arm, Patient Position: Chair, Cuff Size: Adult Regular)   Pulse 82   Temp 99  F (37.2  C) (Tympanic)   Resp 16   Ht 1.613 m (5' 3.5\")   Wt 86.2 kg (190 lb)   LMP 03/12/2022 (Exact Date)   SpO2 99%   BMI 33.13 kg/m   " "  Estimated body mass index is 33.13 kg/m  as calculated from the following:    Height as of this encounter: 1.613 m (5' 3.5\").    Weight as of this encounter: 86.2 kg (190 lb).    Physical Exam  GENERAL: alert and no distress  EYES: Eyes grossly normal to inspection, PERRL and conjunctivae and sclerae normal  HENT: ear canals and TM's normal, nose and mouth without ulcers or lesions  NECK: no adenopathy, no asymmetry, masses, or scars  RESP: lungs clear to auscultation - no rales, rhonchi or wheezes  BREAST: normal without masses, tenderness or nipple discharge and no palpable axillary masses or adenopathy  CV: regular rate and rhythm, normal S1 S2, no S3 or S4, no murmur, click or rub, no peripheral edema  ABDOMEN: soft, nontender, no hepatosplenomegaly, no masses and bowel sounds normal  MS: no gross musculoskeletal defects noted, no edema  SKIN: no suspicious lesions or rashes  NEURO: Normal strength and tone, mentation intact and speech normal  PSYCH: mentation appears normal, affect normal/bright  LYMPH: no cervical, supraclavicular, axillary, or inguinal adenopathy    Signed Electronically by: Kerrie Proctor MD  "

## 2025-02-18 ENCOUNTER — PATIENT OUTREACH (OUTPATIENT)
Dept: CARE COORDINATION | Facility: CLINIC | Age: 45
End: 2025-02-18
Payer: COMMERCIAL

## 2025-02-18 NOTE — RESULT ENCOUNTER NOTE
Hello -    Here are my comments about your recent results:  Clarifying a typo.  Hemoglobin A1c normal at 5.4.  Ionized calcium normal at 5.2.  Calcium mildly elevated at 10.5 awaiting rest of test.  Normal electrolytes and kidney function.  Glucose is slightly elevated in the prediabetic range  Vit d is wnl. Recommend 1000 units daily  Thyroid function is normal.  Triglycerides are elevated similar to prior.  HDL is low similar to prior.  LDL is elevated at 183.  Consider CT calcium score to assess coronary artery risk and weight loss will definitely help as well.  ALT is mildly elevated at 54 rest of the liver function tests are normal  Encouraged a low carbohydrate intake & regular aerobic activity    Please let us know if you have any questions or concerns.     Regards,  Kerrie Proctor MD

## 2025-02-18 NOTE — RESULT ENCOUNTER NOTE
Hello -    Here are my comments about your recent results:  Normal parathyroid     Please let us know if you have any questions or concerns.     Regards,  Kerrie Proctor MD

## 2025-02-26 ENCOUNTER — HOSPITAL ENCOUNTER (OUTPATIENT)
Dept: MRI IMAGING | Facility: HOSPITAL | Age: 45
Discharge: HOME | End: 2025-02-26
Attending: FAMILY MEDICINE
Payer: COMMERCIAL

## 2025-02-26 DIAGNOSIS — Z00.00 ROUTINE HISTORY AND PHYSICAL EXAMINATION OF ADULT: ICD-10-CM

## 2025-02-26 DIAGNOSIS — R92.343 EXTREMELY DENSE TISSUE OF BOTH BREASTS ON MAMMOGRAPHY: ICD-10-CM

## 2025-02-26 PROCEDURE — 255N000002 HC RX 255 OP 636: Performed by: FAMILY MEDICINE

## 2025-02-26 PROCEDURE — A9585 GADOBUTROL INJECTION: HCPCS | Performed by: FAMILY MEDICINE

## 2025-02-26 PROCEDURE — 77049 MRI BREAST C-+ W/CAD BI: CPT

## 2025-02-26 RX ORDER — GADOBUTROL 604.72 MG/ML
0.1 INJECTION INTRAVENOUS ONCE
Status: COMPLETED | OUTPATIENT
Start: 2025-02-26 | End: 2025-02-26

## 2025-02-26 RX ADMIN — GADOBUTROL 9 ML: 604.72 INJECTION INTRAVENOUS at 14:21

## 2025-02-27 NOTE — RESULT ENCOUNTER NOTE
Hello -    Here are my comments about your recent results:  MRI breast shows no malignancy which is good news.  Continue yearly along with annual mammograms    Please let us know if you have any questions or concerns.     Regards,  Kerrie Proctor MD

## 2025-04-07 ENCOUNTER — OFFICE VISIT (OUTPATIENT)
Dept: FAMILY MEDICINE | Facility: CLINIC | Age: 45
End: 2025-04-07
Payer: COMMERCIAL

## 2025-04-07 VITALS
DIASTOLIC BLOOD PRESSURE: 68 MMHG | SYSTOLIC BLOOD PRESSURE: 116 MMHG | HEART RATE: 74 BPM | BODY MASS INDEX: 31.41 KG/M2 | RESPIRATION RATE: 22 BRPM | TEMPERATURE: 96.8 F | HEIGHT: 64 IN | WEIGHT: 184 LBS | OXYGEN SATURATION: 98 %

## 2025-04-07 DIAGNOSIS — E83.52 SERUM CALCIUM ELEVATED: ICD-10-CM

## 2025-04-07 DIAGNOSIS — E78.5 DYSLIPIDEMIA: ICD-10-CM

## 2025-04-07 DIAGNOSIS — K80.20 CALCULUS OF GALLBLADDER WITHOUT CHOLECYSTITIS WITHOUT OBSTRUCTION: ICD-10-CM

## 2025-04-07 DIAGNOSIS — E66.811 CLASS 1 OBESITY DUE TO EXCESS CALORIES WITH SERIOUS COMORBIDITY AND BODY MASS INDEX (BMI) OF 32.0 TO 32.9 IN ADULT: Primary | ICD-10-CM

## 2025-04-07 DIAGNOSIS — Z80.0 FAMILY HISTORY OF ESOPHAGEAL CANCER: ICD-10-CM

## 2025-04-07 DIAGNOSIS — R79.89 ELEVATED LFTS: ICD-10-CM

## 2025-04-07 DIAGNOSIS — Z83.2 FAMILY HISTORY OF FACTOR V LEIDEN MUTATION: ICD-10-CM

## 2025-04-07 DIAGNOSIS — K76.0 NAFLD (NONALCOHOLIC FATTY LIVER DISEASE): ICD-10-CM

## 2025-04-07 DIAGNOSIS — R92.343 EXTREMELY DENSE TISSUE OF BOTH BREASTS ON MAMMOGRAPHY: ICD-10-CM

## 2025-04-07 DIAGNOSIS — R06.83 SNORING: ICD-10-CM

## 2025-04-07 DIAGNOSIS — E66.09 CLASS 1 OBESITY DUE TO EXCESS CALORIES WITH SERIOUS COMORBIDITY AND BODY MASS INDEX (BMI) OF 32.0 TO 32.9 IN ADULT: Primary | ICD-10-CM

## 2025-04-07 PROBLEM — E55.9 VITAMIN D DEFICIENCY: Status: RESOLVED | Noted: 2024-02-12 | Resolved: 2025-04-07

## 2025-04-07 PROCEDURE — G2211 COMPLEX E/M VISIT ADD ON: HCPCS | Performed by: FAMILY MEDICINE

## 2025-04-07 PROCEDURE — 3078F DIAST BP <80 MM HG: CPT | Performed by: FAMILY MEDICINE

## 2025-04-07 PROCEDURE — 99214 OFFICE O/P EST MOD 30 MIN: CPT | Performed by: FAMILY MEDICINE

## 2025-04-07 PROCEDURE — 1126F AMNT PAIN NOTED NONE PRSNT: CPT | Performed by: FAMILY MEDICINE

## 2025-04-07 PROCEDURE — 3074F SYST BP LT 130 MM HG: CPT | Performed by: FAMILY MEDICINE

## 2025-04-07 ASSESSMENT — PAIN SCALES - GENERAL: PAINLEVEL_OUTOF10: NO PAIN (0)

## 2025-04-07 NOTE — PROGRESS NOTES
The below note was dictated using voice recognition. Although reviewed after completion, some word and grammatical error may remain .       Assessment & Plan     Class 1 obesity due to excess calories with serious comorbidity and body mass index (BMI) of 32.0 to 32.9 in adult  Here for follow up of weight. Hx of BMI 33 with history of prediabetes, dyslipidemia, metabolic liver disease & elevated LFT's.  Encouraged low carbohydrate diet increase physical activity and weight loss. Offered nutritionist and referral to weight specialist & was to and recommit to dietary and lifestyle changes. History of resolved prediabetes , glucose slightly up but HB A1c normal at 5.4  Normal electrolytes and kidney function. Thyroid function normal.  DL elevated at 183 history of metabolic liver disease.  At 2/2025 visit reviewed weight loss medications reported not covered by insurance.  Treatment options discussed including no medications, naltrexone, phentermine, Topamax, semaglutide, referral back to the weight clinic, referral to [facility] pharmacist etc. Opted on semaglutide after discussing risk benefits and ruling out contraindications.  Was sent to [facility].  Started in February 2025 and weight documented 190 pounds in the clinic went from 0.25-0.5 weekly today weight in clinic is 184 pounds BMI down from 33 to 32.  Home weight this morning reported 181.2 pounds.  Notes has been feeling full longer but feels would benefit from a higher dose.  Has had no significant side effects other than a little more heartburn or gas resolvable with Beano with each meal.  Discussed could also take Pepcid.  Has had no flareup of gallstones.  With a shortage of medicine resolved no longer able to get from [facility].  Encouraged to get the last refill from [facility] pharmacy before April 22 and then we will send kimberley to her regular pharmacy and hope it will be covered with a prior Auth.  Increasing dose to 1 mg/week to  achieve more weight loss.  Increase compounded semaglutide to 1 mg , get filled before [facility] compounding stops 4 /22  Sent in wegovy 1 mg to [facility] pharmacy, start after done with comounding med  Contact us end of may on weight to adjust med   Hold off on ct calcium score  Hold off on recheck lfts for now  May do Pepcid / continue beano  Mammogram later this year  Stay well hydrated     History of snoring, Offered referral to sleep specialist  defers for now,  recommend sleeping on side and weight loss will help. Had a normal cbc in feb 2025     Elevated LDL, low HDL, elevated TG, A diet high in fat and simple carbohydrates, genetics and being overweight can contribute to this. In feb 2025 noted  elevated Triglycerides, HDL low similar to prior.& LDL is elevated at 183.  The 10-year ASCVD risk score (Vipin DK, et al., 2019) is: 1.6%. Will hold off on CT calcium score for now     History of metabolic fatty liver disease.  Liver function normal without stigmata of advanced liver disease. FibroScan in 2/2023 showing Stage 0-1, 3.3 kilopascals. Grade 3 steatosis. Seen by hepatology & FIB-4 score of 0.78. to check yearly. Recommended a slow gradual weight loss. On-going aggressive risk modification with yearly screening for cholesterol\, blood glucose and sleep apnea. No contraindication to starting a statin with LFT elevations if indicated. Optimize blood glucose as needed. Was advised to consider GLP-1 inhibitor for both insulin resistance and help with weight loss. Encouraged to improve nutrition: continue limiting carbohydrates, especially simple carbohydrates, and following Mediterranean eating pattern, increase physical activity as able, ideally 150 minutes weekly, limit alcohol intake to not more than 3 drinks a week ( reported barely drinks), & to follow up with non-invasive elastography or FibroScan in 5 years ( 2027)  & with Hepatology as needed.  Counseled against THC gummy use as it may also worsen calorie  intake. At 2/2025 visit ALT is mildly elevated at 54 r& est of the liver function tests are normal. FIB-4 Calculation: 0.86 at 2/17/2025 11:09 AM  Has started some weight loss with start of the GLP-1.  Will wait to check labs.  Continue to work on diet and lifestyle as medication is an adjunct.     Asymptomatic gallstones, slow gradual weight loss will prevent flareups from medication.    Hx of elevated calcium, was elevated 10.5 most recently in February 2025 but ionized calcium was normal at 5.2 with normal PTH and resolved vitamin D deficiency so held off on endocrine consult.  No symptoms and encouraged to stay well-hydrated we will continue to monitor.       Had a digital mucinous cyst right middle finger near nail fold noted improved      Multiple pigmented nevi, seen by dermatology 4/2022, a lesion removed nose returned, was benign, no new concerns due for follow-up with dermatology in 2025 as discussed previously with them.     Negative for factor V Leiden deficiency( +family history)     Family history of esophageal cancer, possible colon cancer, & fh of colonic polyps. Colon polyps in dad and may have had ? history of colon cancer maternal grandfather.  In feb noted no cancer history other than just polyps.  Consider  referral given family history of cancer if covered by insurance, defers for now   Colon cancer screening due again in 2027.     Breast no concerns, has extremely dense breasts.  MRI breast normal February 2025 and encouraged to schedule the mammogram later this year     Pap HPV due 2027 given had a supracervical hysterectomy and cervix still in place.  History of prior endometrioma, No symptoms currently, if should have pain, or vaginal bleeding then to contact gynecology.     Noted had Twinrix No. 1 in 2/2024 but then only given Hep B in 4/2024 & 8/2024 instead of Twinrix, February 2025 visit was given hepatitis A #2 to complete the series and will consider checking titers at   physical    Possibly follow up in 3 months in person   - COMPOUNDED NON-CONTROLLED SUBSTANCE (CMPD RX) - PHARMACY TO MIX COMPOUNDED MEDICATION; Semaglutide wegovy 1 mg  for 4 weeks  - Semaglutide-Weight Management (WEGOVY) 1 MG/0.5ML pen; Inject 1 mg subcutaneously once a week.    Snoring  History of snoring, Offered referral to sleep specialist  defers for now,  recommend sleeping on side and weight loss will help. Had a normal cbc in feb 2025  - COMPOUNDED NON-CONTROLLED SUBSTANCE (CMPD RX) - PHARMACY TO MIX COMPOUNDED MEDICATION; Semaglutide wegovy 1 mg  for 4 weeks  - Semaglutide-Weight Management (WEGOVY) 1 MG/0.5ML pen; Inject 1 mg subcutaneously once a week.    Dyslipidemia  Elevated LDL, low HDL, elevated TG, A diet high in fat and simple carbohydrates, genetics and being overweight can contribute to this. In feb 2025 noted  elevated Triglycerides, HDL low similar to prior.& LDL is elevated at 183.  The 10-year ASCVD risk score (Vipin DK, et al., 2019) is: 1.6%. Will hold off on CT calcium score for now  - COMPOUNDED NON-CONTROLLED SUBSTANCE (CMPD RX) - PHARMACY TO MIX COMPOUNDED MEDICATION; Semaglutide wegovy 1 mg  for 4 weeks  - Semaglutide-Weight Management (WEGOVY) 1 MG/0.5ML pen; Inject 1 mg subcutaneously once a week.    NAFLD (nonalcoholic fatty liver disease)  History of metabolic fatty liver disease.  Liver function normal without stigmata of advanced liver disease. FibroScan in 2/2023 showing Stage 0-1, 3.3 kilopascals. Grade 3 steatosis. Seen by hepatology & FIB-4 score of 0.78. to check yearly. Recommended a slow gradual weight loss. On-going aggressive risk modification with yearly screening for cholesterol\, blood glucose and sleep apnea. No contraindication to starting a statin with LFT elevations if indicated. Optimize blood glucose as needed. Was advised to consider GLP-1 inhibitor for both insulin resistance and help with weight loss. Encouraged to improve nutrition: continue limiting  carbohydrates, especially simple carbohydrates, and following Mediterranean eating pattern, increase physical activity as able, ideally 150 minutes weekly, limit alcohol intake to not more than 3 drinks a week ( reported barely drinks), & to follow up with non-invasive elastography or FibroScan in 5 years ( 2027)  & with Hepatology as needed.  Counseled against THC gummy use as it may also worsen calorie intake. At 2/2025 visit ALT is mildly elevated at 54 r& est of the liver function tests are normal. FIB-4 Calculation: 0.86 at 2/17/2025 11:09 AM  Has started some weight loss with start of the GLP-1.  Will wait to check labs.  Continue to work on diet and lifestyle as medication is an adjunct.  - COMPOUNDED NON-CONTROLLED SUBSTANCE (CMPD RX) - PHARMACY TO MIX COMPOUNDED MEDICATION; Semaglutide wegovy 1 mg  for 4 weeks  - Semaglutide-Weight Management (WEGOVY) 1 MG/0.5ML pen; Inject 1 mg subcutaneously once a week.    Elevated LFT's  - Semaglutide-Weight Management (WEGOVY) 1 MG/0.5ML pen; Inject 1 mg subcutaneously once a week.    Calculus of gallbladder without cholecystitis without obstruction  Asymptomatic gallstones, slow gradual weight loss will prevent flareups from medication.    Serum calcium elevated  Hx of elevated calcium, was elevated 10.5 most recently in February 2025 but ionized calcium was normal at 5.2 with normal PTH and resolved vitamin D deficiency so held off on endocrine consult.  No symptoms and encouraged to stay well-hydrated we will continue to monitor.      Family history of factor V Leiden mutationNegative for factor V Leiden deficiency( +family history)    Family history of esophageal cancer  Family history of esophageal cancer, possible colon cancer, & fh of colonic polyps. Colon polyps in dad and may have had ? history of colon cancer maternal grandfather.  In feb noted no cancer history other than just polyps.  Consider  referral given family history of cancer if covered by  insurance, defers for now   Colon cancer screening due again in 2027.    Extremely dense tissue of both breasts on mammography  Breast no concerns, has extremely dense breasts.  MRI breast normal February 2025 and encouraged to schedule the mammogram later this year     Pap HPV due 2027 given had a supracervical hysterectomy and cervix still in place.  History of prior endometrioma, No symptoms currently, if should have pain, or vaginal bleeding then to contact gynecology.     Had a digital mucinous cyst right middle finger near nail fold noted improved      Multiple pigmented nevi, seen by dermatology 4/2022, a lesion removed nose returned, was benign, no new concerns due for follow-up with dermatology in 2025 as discussed previously with them.    Noted had Twinrix No. 1 in 2/2024 but then only given Hep B in 4/2024 & 8/2024 instead of Twinrix, February 2025 visit was given hepatitis A #2 to complete the series and will consider checking titers at  physical    Possibly follow up in 3 months in person     Work on weight loss  Regular exercise  See Patient Instructions  The longitudinal plan of care for the diagnosis(es)/condition(s) as documented were addressed during this visit. Due to the added complexity in care, I will continue to support Arabella in the subsequent management and with ongoing continuity of care.      Subjective   Arabella is a 44 year old, presenting for the following health issues:  Weight Check        4/7/2025     9:09 AM   Additional Questions   Roomed by HUMBERTO Bolden   Accompanied by Self       Via the Health Maintenance questionnaire, the patient has reported the following services have been completed -Mammogram: [facility] [place] [facility] 2024-02-05, this information has not been sent to the abstraction team.  History of Present Illness       Reason for visit:  Follow-up after starting new medication   She is taking medications regularly.          CURRENTLY   Here for follow up of weight. Hx of  BMI 33 with history of prediabetes, dyslipidemia, metabolic liver disease & elevated LFT's.  Encouraged low carbohydrate diet increase physical activity and weight loss. Offered nutritionist and referral to weight specialist & was to and recommit to dietary and lifestyle changes. History of resolved prediabetes , glucose slightly up but HB A1c normal at 5.4  Normal electrolytes and kidney function. Thyroid function normal.  DL elevated at 183 history of metabolic liver disease.  At 2/2025 visit reviewed weight loss medications reported not covered by insurance.  Treatment options discussed including no medications, naltrexone, phentermine, Topamax, semaglutide, referral back to the weight clinic, referral to [facility] pharmacist etc. Opted on semaglutide after discussing risk benefits and ruling out contraindications.  Was sent to [facility].  Started in February 2025 and weight documented 190 pounds in the clinic went from 0.25-0.5 weekly today weight in clinic is 184 pounds BMI down from 33-32.  Home weight this morning reported 181.2 pounds.  Notes has been feeling full longer but feels would benefit from a higher dose.  Has had no significant side effects other than a little more heartburn or gas resolvable with Beano with each meal.  Discussed could also take Pepcid.  Has had no flareup of gallstones.  With a shortage of medicine resolved no longer able to get from [facility] pharmacy.  Encouraged to the last refill from [facility] pharmacy before April 22 and then we will send anything poorly to her regular pharmacy and hope it will be covered with a prior Auth.  Increasing dose to 1 mg/week to achieve more weight loss.  Advise she contact us end of May to follow-up on weight and go from there.    History of snoring, Offered referral to sleep specialist  defers for now,  recommend sleeping on side and weight loss will help. Had a normal cbc in feb 2025     Elevated LDL, low HDL, elevated TG, A diet high  in fat and simple carbohydrates, genetics and being overweight can contribute to this. In feb 2025 noted  elevated Triglycerides, HDL low similar to prior.& LDL is elevated at 183.  The 10-year ASCVD risk score (Vipin LOU, et al., 2019) is: 1.6%    Values used to calculate the score:      Age: 44 years      Sex: Female      Is Non- : No      Diabetic: No      Tobacco smoker: No      Systolic Blood Pressure: 116 mmHg      Is BP treated: No      HDL Cholesterol: 45 mg/dL      Total Cholesterol: 280 mg/dL  Will hold off on CT calcium score for now     History of metabolic fatty liver disease.  Liver function normal without stigmata of advanced liver disease. FibroScan in 2/2023 showing Stage 0-1, 3.3 kilopascals. Grade 3 steatosis. Seen by hepatology & FIB-4 score of 0.78. to check yearly. Recommended a slow gradual weight loss. On-going aggressive risk modification with yearly screening for cholesterol\, blood glucose and sleep apnea. No contraindication to starting a statin with LFT elevations if indicated. Optimize blood glucose as needed. Was advised to consider GLP-1 inhibitor for both insulin resistance and help with weight loss. Encouraged to improve nutrition: continue limiting carbohydrates, especially simple carbohydrates, and following Mediterranean eating pattern, increase physical activity as able, ideally 150 minutes weekly, limit alcohol intake to not more than 3 drinks a week ( reported barely drinks), & to follow up with non-invasive elastography or FibroScan in 5 years ( 2027)  & with Hepatology as needed.  Counseled against THC gummy use as it may also worsen calorie intake. At 2/2025 visit ALT is mildly elevated at 54 r& est of the liver function tests are normal. FIB-4 Calculation: 0.86 at 2/17/2025 11:09 AM  Has started some weight loss with start of the GLP-1.  Will wait to check labs.  Continue to work on diet and lifestyle as medication is an adjunct.     Asymptomatic  gallstones, slow gradual weight loss will prevent flareups from medication.    Hx of elevated calcium, was elevated 10.5 most recently in 2025 but ionized calcium was normal at 5.2 with normal PTH and resolved vitamin D deficiency so held off on endocrine consult.  No symptoms and encouraged to stay well-hydrated we will continue to monitor.       Had a digital mucinous cyst right middle finger near nail fold noted improved      Multiple pigmented nevi, seen by dermatology 2022, a lesion removed nose returned, was benign, no new concerns due for follow-up with dermatology in  as discussed previously with them.     Negative for factor V Leiden deficiency( +family history)     Family history of esophageal cancer, possible colon cancer, & fh of colonic polyps. Colon polyps in dad and may have had ? history of colon cancer maternal grandfather.  In feb noted no cancer history other than just polyps.  Consider  referral given family history of cancer if covered by insurance, defers for now   Colon cancer screening due again in .     Breast no concerns, has extremely dense breasts.  MRI breast normal 2025 and encouraged to schedule the mammogram later this year     Pap HPV due  given had a supracervical hysterectomy and cervix still in place.  History of prior endometrioma, No symptoms currently, if should have pain, or vaginal bleeding then to contact gynecology.     Noted had Twinrix No. 1 in 2024 but then only given Hep B in 2024 & 2024 instead of Twinrix, 2025 visit was given hepatitis A #2 to complete the series and will consider checking titers at  physical    BACKGROUND  44 yr old, , B+, former smoker, BMI more than 33, started semglutide 2025 ( compounded ) snoring, prediabetes, dyslipidemia, nonalcoholic fatty liver disease, elevated LFT's AST ALT seen by hepatologist, fibrosis scan  was stage 0-I with grade 3 steatosis, asymptomatic gallstones,  elevated calcium, vitamin D deficiency, right dorsal radial nail for site of right middle finger digital mucinous cyst half centimeter asymptomatic, multiple pigmented nevi under care of dermatology last seen them 2022, history of abdominal supracervical subtotal hysterectomy for intramural leiomyomas left ovarian cyst abnormal uterine bleeding and prior menorrhagia with regular cycles resulting in iron deficiency anemia, family history of colonic polyps questionable colon cancer, dad with a colectomy history due to polyps and/serrated adenoma, and maternal grandfather with possible colon cancer, mother with history of esophageal cancer, colonoscopy in 2022 and self was normal advise recheck every 5 years due again in 2027, family history of factor V Leiden mutation but negative in self,  opted to defer  consult, previously under care of Barbara Ha, seen by GYN  in 2018 for fibroids following episode of acute pain that made her seek ER care & ct scan ordered. Was told fibroids were unlikely cause of the acute severe pain at that time. Options discussed and was to think about it and get back to gynecology at that time.  [place]  negative.     Seen first time by this provider on 1/17/22 for preventive health and additional concerns. Discussed self breast check regularly. To consider referral to  given fh of esophageal cancer , colon polyps, factor 5 if insurance would cover. Mammogram due and referral placed. Was to return for a Pelvic / PAP when not on her period. Healthcare maintenance reviewed. To consider working on healthcare directives, & honoring choices given. Noted COVID-vaccine including booster and flu shot up-to-date. Labs done. BMI 32:  Encouraged a healthy diet, frequent small meals, less carbohydrates more Mediterranean-style.  To try to lose at least 10% of total body weight over time to be healthier.  After the visit hemoglobin A1c came back elevated at 5.7 suggesting  prediabetes, recommended rechecking in 6 months to 1 year. Due to prior left ovarian cyst, fibroids of uterus and heavy periods, a repeat pelvic ultrasound was ordered. After the visit hemoglobin came back showing anemia with hemoglobin 9.8 and MCH MCV suggestive of iron deficiency, iron and ferritin came back low with ferritin of 9 & iron of 16, suspected from heavy periods.  Was asymptomatic.  Recommended to increase iron in diet, consider taking over-the-counter iron daily and recheck in 3 months with CBC, iron, ferritin, B12, reticulocyte & peripheral smear.  For mole on nose and on right leg and multiple pigmented nevi on skin of face, neck, back, chest, abdomen & extremities,  referred to dermatology for skin check and mole removal. Discussed family history of esophageal cancer and colonic polyps.  Advised to Increase fiber in diet and referred to GI for screening colonoscopy.  Was to consider a  consultation. Family history of factor V Leiden mutation  & testing done after genetic form signed as could help make recommendations in the future with regard to heavy periods and hormonal treatment.  If positive for clotting disorder would avoid estrogen containing compounds.  Labs came back showing a mildly elevated LDL with ASCVD risk of 2%, CMP normal TSH normal ferritin 9 iron 16, hemoglobin 9.8, AST minimally elevated, glucose minimally elevated, hemoglobin A1c 5.7. Mammogram done 1/28 was normal. Pelvic ultrasound on 2/8/2022 showed fibroid filled uterus ovaries not visible and referred to gynecology. Seen by gynecology virtually on 2/11/2022 and advised an MRI scheduled for 12 March and an EMB scheduled for the 22nd and was considering a hysterectomy given had no plans to start a family.   Colonoscopy done 2/18/2022 was normal recommended recheck in 10 years and advised to find out more information on dad's reason for colectomy given history of polyps to see if genetic testing could be done and also  to check for celiac given iron deficiency anemia even if probably due to heavy menses.       Seen 3/7/22 for a pap.  Was to continue iron supplement daily and recheck iron labs & for celiac in 3 months.  Continued on psyllium to prevent constipation from iron. Iron deficiency anemia suspected due to chronic loss due to heavy periods, suspected due to large fibroids. History of ovarian cyst but unable to see ovaries on most recent ultrasound due to fibroid distortion. Family history of factor V Leiden with a recent factor V testing was negative. Discussed Family history of dad having had a colectomy for many serrated adenoma polyps, sister also with history of colonic polyps.  Recent colonoscopy on 2/18/2022 was normal and recommended recheck in 10 years.  GI did recommend checking for celiac and this was to be added to future labs. Given family history though would recommend  consult to assess risk of a genetic polyposis condition.  If unable to do genetic testing to consider colonoscopy every 5 years instead to be safe. Discussed genetic testing may be helpful also given family history of esophageal cancer. Was to continue omega for mildly elevated LDL.  Overall cardiovascular risk was low. Given history of prediabetes and BMI, to continue with fiber intake and eating healthy and losing weight to lower cardiovascular & cancer risk. Discussed could refer to hematology if hemoglobin did not go adequately up post surgery.   Seen by gyn 3/22 and options discussed and planning n MATTHEW, B/l salpingectomy and cystoscopy but to leave ovaries in place unless something found intraoperatively. Planned for iv iron at time of surgery.      On 4/15/21 cbc noted improved to hb of 13.4, celiac testing was negative. Peripheral smear was normal, iron improved form 16 to 36, ferritin improved to 38 & vit B 12 was 562 , wnl.   Seen by derm 4/28/22 and had a shave biopsy of nose lesion and noted other benign lesions.  Seen  6/3/22 for preop for hysterectomy.  CMP showed elevated ALT.  Glucose is elevated.  Hemoglobin A1c in normal range.  Had a normal CBC and cleared for procedure.  On 7/1/22 underwent Abdominal supracervical hysterectomy, bilateral salpingectomy, left oophorectomy, cystoscopy, pelvic washings, for abnormal uterine bleeding, Fibroid uterus w/ bulk symptoms & suspected bilateral hydrosalpinx. Post-operative diagnosis was Fibroid uterus & Endometriosis with very large endometrioma. Pathology showed Uterus, supracervical hysterectomy: Benign endometrial polyp on a background of inactive-type endometrium, negative for hyperplasia or atypia, Leiomyomas, Uterine serosal endometriosis and adhesions. Left ovary and fallopian tube, salpingo-oophorectomy: Ovary with a benign endometriotic cyst & Fallopian tube with endometriosis, Right fallopian tube, salpingectomy: Fallopian tube with endometriosis. Uterus, myomectomy: -Leiomyoma. Pelvic washings were normal. Post op Hb was 10. Seen by gyn 7/18/22 for 2 week post op check, and 8/17/22 for 6 week post op check and noted was doing well.      Seen 10/6/22 Iron def was on iron till had hysterectomy in done in July. Off the iron post op and here for recheck, feeling well. Hx of abdominal supracervical subtotal hysterectomy on 7/1/22, bilateral salpingectomy, left oophorectomy, cystoscopy, pelvic washings. Pathology showed Uterus, supracervical hysterectomy: Benign endometrial polyp on a background of inactive-type endometrium, negative for hyperplasia or atypia, Leiomyomas, Uterine serosal endometriosis and adhesions. Left ovary and fallopian tube, salpingo-oophorectomy: Ovary with a benign endometriotic cyst & Fallopian tube with endometriosis, Right fallopian tube, salpingectomy: Fallopian tube with endometriosis. Uterus, myomectomy: -Leiomyoma. Pelvic washings were normal. Post op Hb was 10. Seen by gyn 7/18/22 for 2 week post op check, and 8/17/22 for 6 week post op check and  noted was doing well. Advised pap every 5 yrs,as cervix still in place,  due 2027 and to see gyn if should have pain given the hx of endometriosis. BMI 33, weight up since last visit as not been able to exercise much due to surgery till now. Working on a couch to [employer] program with sister. Hx of prediabetes,    Elevated ALT, to consider u/s liver. FH colonic polyps, had colonoscopy in 2022 was normal advised recheck in 2025. FH of esophageal cancer, FH of factor 5, Opted no genetic testing for now. Noted a potential exposure to Covid notified on phone but tested negative that am & was asymptomatic so far. Partner had Covid recently and tested positive till 2 weeks prior.was given the flu and Moderna COVID booster.  Labs later showed BMP with elevated glucose, LFT's with elevated AST and ALT, ferritin and CBC and hemoglobin A1c were normal.  Ultrasound done to evaluate liver test on 10/24/2022 showed asymptomatic gallstones and hepatic steatosis and referred to GI.  Mammogram done 1/31/2023 was normal.     Seen 2/10/23 for preventive physical and follow-up from last visit. Breast exam normal to continue with self checks regularly.  Mammogram normal 1/2023.  Pap HPV due in 2027 given had a supracervical hysterectomy and cervix still in place. History of prior endometrioma removal along with fibroids if should have pain to follow-up with Gyn. Noted some mild bleeding here and there but if persisted or got worse to contact gynecology. Healthcare maintenance reviewed. To consider working on healthcare directives. Vaccines for COVID, flu and Tdap up-to-date. To consider hepatitis B vaccination, later lab work for GI showed was not immune to hepatitis B. BMI improved to 32, to continue with healthy lifestyle increased physical activity low-carb intake and healthy diet. Elevated LFT's liver tests and ultrasound showed hepatic steatosis/fatty liver.  To see GI on the 13th and labs for them released to be done. Gallstones  appeared to be asymptomatic, to monitor for pain after a fatty meal.  Sometimes gallstones could occur with rapid weight loss. History of prior prediabetes, HBa1c improved. Declined a  referral given family history of colonic polyps and esophageal cancer. Noted did not factor V Leiden deficiency on testing given family history.  Was to return in 1 year for preventive physical and sooner on an office visit for any new concerns.     B12 came back normal.  LDL was elevated but ASCVD risk was low at 1.3%.  Abnormal TSH hemoglobin A1c.  Was not immune to hepatitis B.  Seen by hepatologist 2/13/2023 for NAFLD.  Fibrosis scan was stage 0-I grade 3 steatosis.  Recommended FIB 4 score yearly with PCP.  Advised slow gradual weight loss.  Okay to start a statin if needed despite elevated LFT's.  To consider GLP-1 inhibitor to help with insulin resistance and weight loss.  Advise aggressive optimization of cholesterol glucose lipids.  Increase physical activity additional test done to rule out other causes and in August alpha-1 antitrypsin was normal F-actin was normal HUMAIRA was normal negative celiac panel AST was 77 .  On 1/29/2024 noted normal INR, AST 49  BMP showed calcium of 10.2 normal CBC and platelets.  Visited with a hepatologist virtually on 2/7/2024 for metabolic associated fatty liver disease FIB 4 score was 0.78.  Advise gradual weight loss, aggressive cholesterol glucose control evaluate for sleep apnea, recheck FibroScan or elastogram in 5 years follow-up with GI as needed.  [place]igram 2/5/2024 was normal.  [place]  negative     Seen 2/12/24 for preventive physical and follow-up. Breast exam normal, to continue self check regularly, mammogram done on 5 February showed dense breast which could  miss tiny things, to continue with annual screening. Pap HPV due 2027 given had a supracervical hysterectomy and cervix still in place. History of prior endometrioma if should have worsening pain  persistent pain vaginal bleeding then to contact gynecology. Recent discomfort in January 2024 may have been related to that or could also have been due to constipation due to change in diet habits etc.  Colon cancer screening due again in 2027. Did have family history of colon polyps in dad and may have had history of colon cancer maternal grandfather to clarify history and get back to us. To consider  referral given family history of cancer if covered by insurance. Healthcare maintenance reviewed. To consider working on healthcare directives and honoring choices given to review.  Vaccines reviewed and given Tdap. Due for hepatitis A and B and Twinrix No. 1 given, to get the second shot in 2 months and the third shot, 4 months after the second. Labs done.   BMI 33 with history of prediabetes, dyslipidemia, metabolic liver disease & elevated LFT's.  Encouraged low carbohydrate diet, increased physical activity and weight loss. Discussed weight loss medications, reported not covered by insurance.  Offered nutritionist and referral to weight specialist but held off & was to recommit to dietary and lifestyle changes.  LDL elevated, HDL low & triglycerides  elevated. The 10-year ASCVD risk score ([place] DK, et al., 2019) was low at 1.6%. AST and ALT elevated rest of liver tests normal. Normal kidney function and lytes & TSH.   Offered referral to sleep specialist given history of snoring but declined, recommend sleeping on side and weight loss would help. History of prediabetes, glucose slightly elevated but HB A1c normal.   History of metabolic fatty liver disease.  Liver function normal without stigmata of advanced liver disease. FibroScan in 2/2023 showing Stage 0-1, 3.3 kilopascals. Grade 3 steatosis. Seen by hepatology & FIB-4 score of 0.78. to check yearly.   Recommend a slow gradual weight loss. On-going aggressive risk modification with yearly screening for cholesterol, blood glucose and sleep apnea.  No contraindication to starting a statin with LFT elevations if indicated. Optimize blood glucose as needed. Could consider GLP-1 inhibitor for both insulin resistance and help with weight loss ( reported checked and meds not covered). Encouraged to improve nutrition: continue limiting carbohydrates, especially simple carbohydrates, and following Mediterranean eating pattern, increase physical activity as able, ideally 150 minutes weekly, limit alcohol intake to not more than 3 drinks a week ( reported barely drank, & to follow up with non-invasive elastography or FibroScan in 5 years ( 2027)  & with Hepatology as needed.  Counseled against THC gummy use as it could also worsen calorie intake  Asymptomatic gallstones gradual weight loss would prevent these from flaring up.  Recent labs showed mildly elevated calcium. Ionized calcium was elevated at 5.3. Parathyroid normal vitamin D low.. Recommend replacing vitamin D 5000 IU daily for 6 weeks and then 1000 IU daily to maintain levels. Then to recheck in a lab only appointment in 2 months.  Had a digital mucinous cyst right middle finger near nail fold that was not causing any pain or affecting functioning. To monitor for skin/nail color changes  Multiple pigmented nevi, seen by dermatology 4/2022, a lesion removed nose returned, was benign, had a new 2 mm itchy brown raised mole ventral surface mid right wrist that looked benign under magnification. To monitor for now and follow-up with dermatology in 2025 as discussed previously with them  Could consider referral to  given family history of esophageal cancer, possible colon cancer, colonic polyps. Reviewed previously tested negative for factor V Leiden deficiency( +family history). Was to return in 1 year for preventive physical and sooner in an office visit for any new concerns     Labs showed elevated glucose, elevated LDL, ASCVD risk of 1.6%. normal TSH, cbc, ferritin, but AST & ALT elevated and  advised recheck 6 weeks. Calcium was elevated vit d low and advised vit d replacement and recheck. On 4/15/25 given Hep B instead of Twinrix # 2 & same on 8/12/24. On 8/12/24 noted AST normal, ALT elevated at 59, LDL went form 178 to 167 & HDL 41, total cholesterol from 270 to 258 & ASCVD unchanged at 1.6 %. Glucose was 114, normal calcium, ionized calcium was elevated at 5.3, with normal pth, vit d improved from 13 to 25 was to continue vit d 2000 units & consider endo referral. No response received    Seen 2/17/25 for preventive physical and follow-up. Healthcare maintenance reviewed. To consider working on healthcare directives and honoring choices given to review.    Breast no concerns, exam normal, Yearly mammogram due in 2024 noted extremely dense breasts which could miss tiny things, to continue with annual screening. Options discussed & to schedule 3d Mammo and mri & check with insurance before scheduling.    Pap HPV due 2027 given had a supracervical hysterectomy and cervix still in place.  History of prior endometrioma, No symptoms, if should have pain, or vaginal bleeding then to contact gynecology.   Colon cancer screening due again in 2027. Did  have family history of colon polyps in dad and may have had ? history of colon cancer maternal grandfather. In feb 2025 clarified only polyps. Could consider  referral given family history of cancer if covered by insurance.   Vaccines reviewed and utd for flu & COVID. Noted had Twinrix No. 1 in 2/2024 but then only given Hep B in 4/2024 & 8/2024 instead of Twinrix, will do Hep A # 2 today to complete series and consider titers next year given unconventional dosing.   Noted  a cough 1 day. Partner was sick with similar symptoms 1 week prior but was negative for COVID. She had had no fever, felt a bit congested in chest and occ dry cough that am, no chest pain, trouble breathing or palpitations, Options discussed, opted out of testing, to monitor at home  and discuss symptomatic care and when to return for red flag symptoms/ signs.   BMI 33 with history of prediabetes, dyslipidemia, metabolic liver disease & elevated LFT's.  Encouraged low carbohydrate diet increase physical activity and weight loss. Discussed weight loss medications reported not covered by insurance.  Offered nutritionist and referral to weight specialist will hold off for now and recommit to dietary and lifestyle changes. To ook see if ordering GLP 1 compounded will be affordable or not   History of resolved prediabetes , glucose slightly elevated in the prediabetic range but HB A1c  normal at 5.4  Normal electrolytes and kidney function. Thyroid function normal. Encouraged a low carbohydrate smaller portion diet increase aerobic activity and staying active. Treatment options discussed including no medications, naltrexone, phentermine, Topamax, semaglutide, referral back to the weight clinic, referral to [facility] pharmacist etc. Opted on semaglutide. Prescription sent into compounding pharmacy to see if cost acceptable risk & contraindications and side effects reviewed & understood  History of snoring, Offered referral to sleep specialist  defers for now,  recommended sleeping on side and weight loss would help. Had a normal CBC.   Hx of elevated LDL, low HDL, elevated TG, A diet high in fat and simple carbohydrates, genetics and being overweight can contribute to this. The 10-year ASCVD risk score (Vipin DK, et al., 2019) was 1.6% Labs came back with elevated Triglycerides, HDL is low similar to prior.& LDL is elevated at 183.  Could consider CT calcium score to assess coronary artery risk and weight loss would definitely help as well.  History of metabolic fatty liver disease.  Liver function normal without stigmata of advanced liver disease. FibroScan in 2/2023 showing Stage 0-1, 3.3 kilopascals. Grade 3 steatosis. Seen by hepatology & FIB-4 score of 0.78. to check yearly. Recommended a slow gradual  weight loss. On-going aggressive risk modification with yearly screening for cholesterol\, blood glucose and sleep apnea. No contraindication to starting a statin with LFT elevations if indicated. Optimize blood glucose as needed. Could consider GLP-1 inhibitor for both insulin resistance and help with weight loss. Encouraged to improve nutrition: continue limiting carbohydrates, especially simple carbohydrates, and following Mediterranean eating pattern, increase physical activity as able, ideally 150 minutes weekly, limit alcohol intake to not more than 3 drinks a week ( reported barely drinks), & to follow up with non-invasive elastography or FibroScan in 5 years ( 2027)  & with Hepatology as needed.  Counseled against THC gummy use as it may also worsen calorie intake.   ALT was mildly elevated at 54 rest of the liver function tests are normal. Encouraged a low carbohydrate intake & regular aerobic activity. FIB-4 Calculation: 0.86 at 2/17/2025 11:09 AM  Asymptomatic gallstones gradual weight loss to prevent these from flaring up.   Hx of elevated calcium, but ionized calcium with normal Parathyroid & hx of vit d def. Took vit d a while. Calcium mildly elevated at 10.5 but Ionized calcium normal at 5.2. with normal pth & vit d so did not need endo consult at this time.   Resolved vit d def, recommended 1000 units daily  Had a digital mucinous cyst right middle finger near nail fold noted improved   Multiple pigmented nevi, seen by dermatology 4/2022, a lesion removed nose returned, was benign, no new concerns due for follow-up with dermatology in 2025 as discussed previously with them.  Negative for factor V Leiden deficiency( +family history). Family history of esophageal cancer, possible colon cancer, & fh of colonic polyps. Declined a  referral. Was to return in 6 to 8 wks fo a wt check    On 2/26 MRI breast was normal.    Review of Systems  Constitutional, HEENT, cardiovascular, pulmonary, GI, ,  "musculoskeletal, neuro, skin, endocrine and psych systems are negative, except as otherwise noted.      Objective    /68 (BP Location: Right arm, Patient Position: Sitting, Cuff Size: Adult Regular)   Pulse 74   Temp 96.8  F (36  C) (Temporal)   Resp 22   Ht 1.615 m (5' 3.58\")   Wt 83.5 kg (184 lb)   LMP 03/12/2022 (Exact Date)   SpO2 98%   BMI 32.00 kg/m    Body mass index is 32 kg/m .  Physical Exam   GENERAL: alert and no distress  EYES: Eyes grossly normal to inspection, PERRL and conjunctivae and sclerae normal, glasses  RESP: lungs clear to auscultation - no rales, rhonchi or wheezes  CV: regular rate and rhythm, normal S1 S2, no S3 or S4, no murmur, click or rub, no peripheral edema  ABDOMEN: soft, nontender  MS: no gross musculoskeletal defects noted, no edema  SKIN: no suspicious lesions or rashes  NEURO: Normal strength and tone, mentation intact and speech normal  PSYCH: mentation appears normal, affect normal/bright    No results found for any visits on 04/07/25.  No results found for this or any previous visit (from the past 24 hours).        Signed Electronically by: Kerrie Proctor MD  "

## 2025-04-07 NOTE — PATIENT INSTRUCTIONS
Increase compounded semaglutide to 1 mg , get filled before [place] compounding stops 4 /22  Sent in wegovy 1 mg to [facility] pharmacy, start after done with comounding med  Contact us end of may on weight to adjust med   Hold off on ct calcium score  Hold off on recheck lfts for now  May do pepcid / continue beano  Mammogram later this year  Stay well hydrated   Possibly follow up in 3 months in person

## 2025-04-20 ENCOUNTER — HEALTH MAINTENANCE LETTER (OUTPATIENT)
Age: 45
End: 2025-04-20

## 2025-05-05 ENCOUNTER — TELEPHONE (OUTPATIENT)
Dept: FAMILY MEDICINE | Facility: CLINIC | Age: 45
End: 2025-05-05
Payer: COMMERCIAL

## 2025-05-05 NOTE — TELEPHONE ENCOUNTER
Please refrain from closing out PA encounters until it has been addressed fully by the [employer] [facility] PA Team. Status will be updated to Approved/Denied/Dismissed/PA Not Needed. Closing/signing the encounters results in missed submission to the patient's insurance as that encounter disappears from our PA Pool and does not get sent. Thank you!  Retail Pharmacy Prior Authorization Team   Phone: [phone]      CMM KEY:  (Key: [id number])    PA Initiation    Medication: WEGOVY 1 MG/0.5ML SC SOAJ  Insurance Company: [employer] ([facility]) - Phone [phone] Fax [phone]  Pharmacy Filling the Rx:    Filling Pharmacy Phone:    Filling Pharmacy Fax:    Start Date: 5/5/2025

## 2025-05-05 NOTE — TELEPHONE ENCOUNTER
Please complete PA for Wegovy-thanks! Routing high as patient was advised this would be worked on beginning of April-

## 2025-05-06 ENCOUNTER — MYC MEDICAL ADVICE (OUTPATIENT)
Dept: FAMILY MEDICINE | Facility: CLINIC | Age: 45
End: 2025-05-06
Payer: COMMERCIAL

## 2025-05-06 NOTE — TELEPHONE ENCOUNTER
Retail Pharmacy Prior Authorization Team   Phone: [phone]      PRIOR AUTHORIZATION DENIED    Medication: WEGOVY 1 MG/0.5ML SC SOAJ  Insurance Company: [employer] ([facility]) - Phone [phone] Fax [phone]  Denial Date: 5/5/2025  Denial Reason(s):         Appeal Information: Drug exclusions cannot be appealed.  This medication will not be covered by the prescription plan for any reason. The drug is not on formulary and there are no loopholes to gaining approval.    Patient Notified: No. The [employer] [facility] PA Team does not notify of the denial outcomes as the patient often will ask what their provider will prescribe in place of the denied medication, or additional information in regards to other therapies they can take in place of the denied medication.  This is not something we can advise on in our department.

## 2025-05-06 NOTE — TELEPHONE ENCOUNTER
Dr. Hitesh - Wegovy PA denied and no option for appeal. Appreciate advisement on OOP payment, alternate medication option, etc.    GISELL Buck, BSN, PHN, AMB-BC (she/her)  [facility] RN

## 2025-05-06 NOTE — TELEPHONE ENCOUNTER
There are several OOP options including compounded meds.  She can also look for coupons.      If she'd like to discuss alternative options I recommend she schedule an appointment with one of the  clinicians or await Dr. Proctor's return to clinic at the end of the month.      Shahnaz Chapman MD  [facility]

## 2025-05-06 NOTE — TELEPHONE ENCOUNTER
Discussed with pt.  She made a virtual visit with  clinician for this concern.  Karen, Triage RN  [facility]/ [phone]

## 2025-05-06 NOTE — TELEPHONE ENCOUNTER
I talked to pt on another TE.  Made virtual appt to discuss options.  Karen, Triage RN  [facility]/ [phone]

## 2025-05-14 ENCOUNTER — VIRTUAL VISIT (OUTPATIENT)
Dept: FAMILY MEDICINE | Facility: CLINIC | Age: 45
End: 2025-05-14
Payer: COMMERCIAL

## 2025-05-14 DIAGNOSIS — E66.811 CLASS 1 OBESITY DUE TO EXCESS CALORIES WITH SERIOUS COMORBIDITY AND BODY MASS INDEX (BMI) OF 33.0 TO 33.9 IN ADULT: Primary | ICD-10-CM

## 2025-05-14 DIAGNOSIS — E66.09 CLASS 1 OBESITY DUE TO EXCESS CALORIES WITH SERIOUS COMORBIDITY AND BODY MASS INDEX (BMI) OF 33.0 TO 33.9 IN ADULT: Primary | ICD-10-CM

## 2025-05-14 PROCEDURE — 98006 SYNCH AUDIO-VIDEO EST MOD 30: CPT | Performed by: PHYSICIAN ASSISTANT

## 2025-05-14 NOTE — PATIENT INSTRUCTIONS
Sublingual Semaglutide at [facility] [place] offering sublingual semaglutide. This is an alternative option for patients who may not have access to or insurance coverage for commercially available semaglutide products.  Currently, the FDA-approved forms of semaglutide include:  Rybelsus (oral tablet) - approved for Type 2 Diabetes  Ozempic (injectable) - approved for Type 2 Diabetes  Wegovy (injectable) - approved for chronic weight management and cardiovascular risk reduction in certain populations  Unlike FDA-approved products, sublingual semaglutide has not been studied in large clinical trials, so its effectiveness and safety in this form are not fully known. However, it may be a helpful alternative when other formulations are not accessible.    Sublingual means the medication is placed under the tongue, where it dissolves and is absorbed directly into the bloodstream. At [facility], sublingual semaglutide is made by combining commercially available Rybelsus tablets with a base called Submagna to create a liquid that can be absorbed under the tongue.    Compounding is permitted when a medication is not available in the needed form, like sublingual semaglutide, and a provider writes a prescription for a specific patient. [facility] follows strict safety and quality standards, including [place] Pharmacopeia (USP) 795 guidelines for non-sterile compounding. Since sublingual semaglutide is not available commercially, it is eligible for compounding under these guidelines.     Availability:  Sublingual semaglutide is expected to be available long term.    Dosing:  Available doses include 0.5 mg, 1 mg, 1.5 mg, 2 mg and 2.5 mg. Further dose escalation above 2.5 mg can be can discussed with your provider based on your individual response and if appropriate.     Sublingual Semaglutide 2 mg/mL   mg mL   0.5 mg 0.25 mL   1 mg 0.5 mL    1.5 mg 0.75 mL   2 mg 1 mL   2.5 mg 1.25 mL     New Start: Begin with 0.5 mg (0.25 mL) once daily for 2-4 weeks. If tolerated, increase to 1 mg (0.5 mL) once daily for at least 4 weeks. If necessary thereafter, dose can be increased by 0.5 mg (0.25 mL) every 4 weeks (e.g. 1.5 mg, then 2 mg, then 2.5 mg). Follow the dosing and escalation recommendations prescribed by your provider. Do not escalate dosing further than prescribed by your provider.     From injectable Semaglutide: There are no official studies comparing injectable and sublingual semaglutide doses. If you are switching from injectable to sublingual semaglutide, your provider will help choose the most appropriate dose for you.    Administration:  Sublingual semaglutide is administered once daily. Flavor is spearmint similar to Ileana double mint gum or spearmint toothpaste.     Shake well before each use (product has a thick, honey-like consistency)  Use syringe to draw your dose from bottle   Place medication under your tongue and hold under tongue a long as possible (at least 60-90 seconds), then swallow  If the volume feels like too much, you may split the dose into two parts, taken 5-10 minutes apart  Avoid eating, drinking, or smoking for 30 minutes afterward    Helpful Routine Tip: Brush your teeth and rinse your mouth, take your medication, then shower or get ready for the day.    Storage:  Store at room temperature. The medication remains stable for 90 days.    Common Side Effects:  Side effects may be similar to FDA-approved semaglutide products (Rybelsus, Wegovy, Ozempic), though the sublingual version has not been formally studied. Common side effects include: nausea, diarrhea, constipation, headache, indigestion, tiredness (fatigue), stomach upset or abdominal pain. Less commonly, semaglutide can cause low blood sugar (symptoms: shaky, dizzy, sweaty, agitation). Contact your care team if side effects are bothersome or unmanageable or if you  are concerned for low blood sugar.     Tips to reduce side effects:   Eat regular meals (don t skip meals)  Stop eating when feeling satiated/satisfied.  Stay hydrated--aim for at least 64 oz of water daily    Obtaining Medication From Pharmacy:   You will receive an automated call once the pharmacy receives your prescription. You must call back and speak to a team member to confirm name, product, shipping, and cost. If you have not received a call within 3 business days, call the pharmacy directly.    [facility] Phone:  [phone]    Shipping available to: [place], [place], [place], [place], [place], [place], [place].     Managing Refills:  Call [phone]   Download the [employer] fay Download  Online:  [email or web address]    Cost:   0.5 mg and 1 mg doses  ~$180 for 30-day supply  ~$450 for 90-day supply   1.5 mg dose  ~$200-$300 for a 30-day supply   May have cost savings for 90 day supply  2 mg dose  ~$300 for a 30-day supply  May have cost savings for 90 day supply  Dosing higher than 2 mg will be determine by [facility]

## 2025-05-14 NOTE — PROGRESS NOTES
Arabella is a 44 year old who is being evaluated via a billable video visit.    How would you like to obtain your AVS? MyChart  If the video visit is dropped, the invitation should be resent by: Text to cell phone: [phone]  Will anyone else be joining your video visit? No      Assessment & Plan     Class 1 obesity due to excess calories with serious comorbidity and body mass index (BMI) of 33.0 to 33.9 in adult - discussed options, she would like to try daily sublingual semaglutide. Reviewed lack of efficacy data. Will switch to sublingual and dose escalate - start with 2mg daily dose x4 weeks then increase to 2.5mg weekly dose. Follow up in 3 months.  - COMPOUNDED NON-CONTROLLED SUBSTANCE (CMPD RX) - PHARMACY TO MIX COMPOUNDED MEDICATION; Semaglutide 2mg/mL. Weeks 1-4: Take 2mg (1mL) once daily under the tongue. Weeks 5+: Take 2.5mg (1.25mL) once daily under the tongue. Place medication under your tongue and hold under tongue a long as possible (at least 60-90 seconds), then swallow.    The longitudinal plan of care for the diagnosis(es)/condition(s) as documented were addressed during this visit. Due to the added complexity in care, I will continue to support Arabella in the subsequent management and with ongoing continuity of care.      Subjective   Arabella is a 44 year old, presenting for the following health issues:  Recheck Medication      5/14/2025    10:01 AM   Additional Questions   Roomed by Monik WHITTAKER     Via the Health Maintenance questionnaire, the patient has reported the following services have been completed -Mammogram: [facility] 2024-02-05, this information has not been sent to the abstraction team.    Arabella called today for video visit for weight med follow up    Started injectable wegovy 2/1/25  Tolerating it well, up to 1mg weekly dose  Has been purchasing compounded injectable wegovy - but now not available  Wondering about other options vs stopping - cannot afford $500 direct to consumer option   Has had  weight loss, but not quite 1lb/week    History of Present Illness       Reason for visit:  To talk about alternatives to Wegovy which has been denied by insurance coverage    She eats 4 or more servings of fruits and vegetables daily.She consumes 0 sweetened beverage(s) daily.She exercises with enough effort to increase her heart rate 30 to 60 minutes per day.  She exercises with enough effort to increase her heart rate 3 or less days per week.   She is taking medications regularly.          Objective       Vitals:  No vitals were obtained today due to virtual visit.    Physical Exam   GENERAL: alert and no distress  EYES: Eyes grossly normal to inspection.  No discharge or erythema, or obvious scleral/conjunctival abnormalities.  RESP: No audible wheeze, cough, or visible cyanosis.    SKIN: Visible skin clear. No significant rash, abnormal pigmentation or lesions.  NEURO: Cranial nerves grossly intact.  Mentation and speech appropriate for age.  PSYCH: Appropriate affect, tone, and pace of words        Video-Visit Details    Type of service:  Video Visit   Originating Location (pt. Location): Home    Distant Location (provider location):  On-site  Platform used for Video Visit: Sivakumar  Signed Electronically by: Latha Palm PA-C

## 2025-07-16 ENCOUNTER — MYC MEDICAL ADVICE (OUTPATIENT)
Dept: FAMILY MEDICINE | Facility: CLINIC | Age: 45
End: 2025-07-16
Payer: COMMERCIAL

## 2025-08-04 ENCOUNTER — PATIENT OUTREACH (OUTPATIENT)
Dept: CARE COORDINATION | Facility: CLINIC | Age: 45
End: 2025-08-04
Payer: COMMERCIAL

## (undated) DEVICE — PREP CHLORAPREP 26ML TINTED HI-LITE ORANGE 930815

## (undated) DEVICE — SUCTION MANIFOLD NEPTUNE 2 SYS 1 PORT 702-025-000

## (undated) DEVICE — SPECIMEN CONTAINER 5OZ STERILE 2600SA

## (undated) DEVICE — Device

## (undated) DEVICE — PAD CHUX UNDERPAD 30X36" P3036C

## (undated) DEVICE — DRAPE SLEEVE 599

## (undated) DEVICE — BLADE CLIPPER SGL USE 9680

## (undated) DEVICE — ESU LIGASURE IMPACT OPEN SEALER/DVDR CVD LG JAW LF4418

## (undated) DEVICE — DRAPE LAVH/LAPAROSCOPY W/POUCH 29474

## (undated) DEVICE — SOL WATER IRRIG 3000ML BAG 2B7117

## (undated) DEVICE — DRSG TELFA ISLAND 4X8" 7541

## (undated) DEVICE — GOWN XLG DISP 9545

## (undated) DEVICE — SU MONOCRYL 4-0 PS-2 18" UND Y496G

## (undated) DEVICE — GLOVE PROTEXIS W/NEU-THERA 6.5  2D73TE65

## (undated) DEVICE — SYR 50ML CATH TIP W/O NDL 309620

## (undated) DEVICE — COVER CAMERA IN-LIGHT DISP LT-C02

## (undated) DEVICE — SU VICRYL 3-0 SH 27" J316H

## (undated) DEVICE — SU VICRYL 0 CT-2 27" J334H

## (undated) DEVICE — LINEN ORTHO PACK 5446

## (undated) DEVICE — SU VICRYL 0 CT-2 CR 8X18" J727D

## (undated) DEVICE — WIPES FOLEY CARE SURESTEP PROVON DFC100

## (undated) DEVICE — PAD PERI INDIV WRAP 11" 2022A

## (undated) DEVICE — GLOVE PROTEXIS BLUE W/NEU-THERA 6.5  2D73EB65

## (undated) DEVICE — STRAP KNEE/BODY 31143004

## (undated) DEVICE — DRSG STERI STRIP 1/2X4" R1547

## (undated) DEVICE — CATH TRAY FOLEY SURESTEP 16FR WDRAIN BAG STLK LATEX A300316A

## (undated) DEVICE — SOL NACL 0.9% IRRIG 1000ML BOTTLE 2F7124

## (undated) DEVICE — SU PLAIN 3-0 CT 27" 852H

## (undated) DEVICE — LINEN TOWEL PACK X5 5464

## (undated) DEVICE — PREP SKIN SCRUB TRAY 4461A

## (undated) DEVICE — DRAPE UNDER BUTTOCK 8483

## (undated) DEVICE — SU VICRYL 0 CT-1 36" J346H

## (undated) DEVICE — SUCTION TIP POOLE K770

## (undated) DEVICE — GLOVE PROTEXIS BLUE W/NEU-THERA 7.0  2D73EB70

## (undated) DEVICE — SPONGE LAP 18X18" X8435

## (undated) DEVICE — SU PDS II 0 CTX 60" Z990G

## (undated) DEVICE — ESU GROUND PAD UNIVERSAL W/O CORD

## (undated) DEVICE — PREP SCRUB SOL EXIDINE 4% CHG 4OZ 29002-404

## (undated) DEVICE — LINEN GOWN X4 5410

## (undated) DEVICE — ESU PENCIL W/HOLSTER E2350H

## (undated) DEVICE — SYR BULB IRRIG DOVER 60 ML LATEX FREE 67000

## (undated) RX ORDER — FENTANYL CITRATE 50 UG/ML
INJECTION, SOLUTION INTRAMUSCULAR; INTRAVENOUS
Status: DISPENSED
Start: 2022-07-01

## (undated) RX ORDER — CEFAZOLIN SODIUM 1 G/3ML
INJECTION, POWDER, FOR SOLUTION INTRAMUSCULAR; INTRAVENOUS
Status: DISPENSED
Start: 2022-07-01

## (undated) RX ORDER — LIDOCAINE HYDROCHLORIDE 20 MG/ML
INJECTION, SOLUTION EPIDURAL; INFILTRATION; INTRACAUDAL; PERINEURAL
Status: DISPENSED
Start: 2022-07-01

## (undated) RX ORDER — PHENAZOPYRIDINE HYDROCHLORIDE 200 MG/1
TABLET, FILM COATED ORAL
Status: DISPENSED
Start: 2022-07-01

## (undated) RX ORDER — KETOROLAC TROMETHAMINE 30 MG/ML
INJECTION, SOLUTION INTRAMUSCULAR; INTRAVENOUS
Status: DISPENSED
Start: 2022-07-01

## (undated) RX ORDER — ACETAMINOPHEN 325 MG/1
TABLET ORAL
Status: DISPENSED
Start: 2022-07-01

## (undated) RX ORDER — CEFAZOLIN SODIUM/WATER 2 G/20 ML
SYRINGE (ML) INTRAVENOUS
Status: DISPENSED
Start: 2022-07-01

## (undated) RX ORDER — ONDANSETRON 2 MG/ML
INJECTION INTRAMUSCULAR; INTRAVENOUS
Status: DISPENSED
Start: 2022-07-01

## (undated) RX ORDER — PROPOFOL 10 MG/ML
INJECTION, EMULSION INTRAVENOUS
Status: DISPENSED
Start: 2022-07-01

## (undated) RX ORDER — HYDROMORPHONE HYDROCHLORIDE 1 MG/ML
INJECTION, SOLUTION INTRAMUSCULAR; INTRAVENOUS; SUBCUTANEOUS
Status: DISPENSED
Start: 2022-07-01

## (undated) RX ORDER — FENTANYL CITRATE-0.9 % NACL/PF 10 MCG/ML
PLASTIC BAG, INJECTION (ML) INTRAVENOUS
Status: DISPENSED
Start: 2022-07-01

## (undated) RX ORDER — DEXAMETHASONE SODIUM PHOSPHATE 4 MG/ML
INJECTION, SOLUTION INTRA-ARTICULAR; INTRALESIONAL; INTRAMUSCULAR; INTRAVENOUS; SOFT TISSUE
Status: DISPENSED
Start: 2022-07-01

## (undated) RX ORDER — GLYCOPYRROLATE 0.2 MG/ML
INJECTION INTRAMUSCULAR; INTRAVENOUS
Status: DISPENSED
Start: 2022-07-01